# Patient Record
Sex: MALE | Race: WHITE | NOT HISPANIC OR LATINO | Employment: OTHER | URBAN - NONMETROPOLITAN AREA
[De-identification: names, ages, dates, MRNs, and addresses within clinical notes are randomized per-mention and may not be internally consistent; named-entity substitution may affect disease eponyms.]

---

## 2017-01-11 ENCOUNTER — OFFICE VISIT (OUTPATIENT)
Dept: ORTHOPEDICS | Facility: OTHER | Age: 42
End: 2017-01-11
Attending: ORTHOPAEDIC SURGERY

## 2017-01-11 VITALS
BODY MASS INDEX: 25.92 KG/M2 | HEART RATE: 82 BPM | TEMPERATURE: 98.1 F | SYSTOLIC BLOOD PRESSURE: 100 MMHG | WEIGHT: 175 LBS | DIASTOLIC BLOOD PRESSURE: 60 MMHG | HEIGHT: 69 IN | OXYGEN SATURATION: 97 %

## 2017-01-11 DIAGNOSIS — S54.01XD CONTUSION OF RIGHT ULNAR NERVE, SUBSEQUENT ENCOUNTER: ICD-10-CM

## 2017-01-11 DIAGNOSIS — S42.444D CLOSED NONDISPLACED AVULSION FRACTURE OF MEDIAL EPICONDYLE OF RIGHT HUMERUS WITH ROUTINE HEALING, SUBSEQUENT ENCOUNTER: Primary | ICD-10-CM

## 2017-01-11 PROCEDURE — 99207 ZZC FRACTURE CARE IN GLOBAL PERIOD: CPT | Performed by: PHYSICIAN ASSISTANT

## 2017-01-11 ASSESSMENT — PAIN SCALES - GENERAL: PAINLEVEL: MILD PAIN (3)

## 2017-01-11 NOTE — MR AVS SNAPSHOT
"              After Visit Summary   1/11/2017    Jose Alberto Lu    MRN: [id number]           Patient Information     Date Of Birth          1975        Visit Information        Provider Department      1/11/2017 10:00 AM Damian Bond PA-C  [facility]        Today's Diagnoses     Closed nondisplaced avulsion fracture of medial epicondyle of right humerus with routine healing, subsequent encounter    -  1     Contusion of right ulnar nerve, subsequent encounter           Care Instructions    Work on ROM as discussed.  Light use with right arm is ok.  Follow up in 3 weeks for recheck.        Follow-ups after your visit        Follow-up notes from your care team     Return in about 3 weeks (around 2/1/2017) for recheck right elbow.      Who to contact     If you have questions or need follow up information about today's clinic visit or your schedule please contact  [facility] directly at [phone].  Normal or non-critical lab and imaging results will be communicated to you by ITNhart, letter or phone within 4 business days after the clinic has received the results. If you do not hear from us within 7 days, please contact the clinic through ITNhart or phone. If you have a critical or abnormal lab result, we will notify you by phone as soon as possible.  Submit refill requests through [employer] or call your pharmacy and they will forward the refill request to us. Please allow 3 business days for your refill to be completed.          Additional Information About Your Visit        MyChart Information     [employer] lets you send messages to your doctor, view your test results, renew your prescriptions, schedule appointments and more. To sign up, go to [email or web address] . Click on \"Log in\" on the left side of the screen, which will take you to the Welcome page. Then click on \"Sign up Now\" on the right side of the page.     You will be asked to enter the access code listed below, as well as some " "personal information. Please follow the directions to create your username and password.     Your access code is: [email or web address]  Expires: 3/23/2017  6:27 PM     Your access code will  in 90 days. If you need help or a new code, please call your [facility] clinic or [phone].        Care EveryWhere ID     This is your Care EveryWhere ID. This could be used by other organizations to access your [facility] medical records  [id number]        Your Vitals Were     Pulse Temperature Height BMI (Body Mass Index) Pulse Oximetry       82 98.1  F (36.7  C) (Tympanic) 5' 9\" (1.753 m) 25.83 kg/m2 97%        Blood Pressure from Last 3 Encounters:   17 100/60   16 100/80   16 114/67    Weight from Last 3 Encounters:   17 175 lb (79.379 kg)   16 179 lb 3.2 oz (81.285 kg)   13 170 lb (77.111 kg)              Today, you had the following     No orders found for display       Primary Care Provider    None       No address on file        Thank you!     Thank you for choosing  [facility]  for your care. Our goal is always to provide you with excellent care. Hearing back from our patients is one way we can continue to improve our services. Please take a few minutes to complete the written survey that you may receive in the mail after your visit with us. Thank you!             Your Updated Medication List - Protect others around you: Learn how to safely use, store and throw away your medicines at www.disposemymeds.org.          This list is accurate as of: 17 10:19 AM.  Always use your most recent med list.                   Brand Name Dispense Instructions for use    CLONIDINE HCL PO      Take 0.1 mg by mouth 2 times daily       REMERON PO      Take 30 mg by mouth At Bedtime       traMADol 50 MG tablet    ULTRAM    15 tablet    Take half to one tablet every 6-8 hours as needed for pain         "

## 2017-01-11 NOTE — PROGRESS NOTES
"Chief complaint: Recheck right elbow    Subjective: Jose Alberto returns today for recheck of his right elbow.  He was seen on 12/27/16 by Dr. Larios and diagnosed with a right elbow nondisplaced avulsion fracture of the medial epicondyle and associated ulnar nerve contusion after being hit by a puck.  He was placed in a long-arm splint which he has tolerated reasonably well.  He kept it intact.  At his initial visit he was also noted to have median nerve and superficial radial nerve paresthesias which was felt due to a tight dressing.  These symptoms have resolved.  He denies paresthesias in an ulnar nerve distribution at this point unless he bumps the inside of the right elbow.  Overall, he has much less discomfort.  He feels 80% improved.  He is here today to see if he is going to be able to start his new job this coming Monday.  At this point, the patient feels he should be ready.    Past medical, past surgical, social history, family history, medications, and allergies reviewed and updated as appropriate today in [facility].    ROS:  See HPI    Current Outpatient Prescriptions   Medication Sig Dispense Refill     CLONIDINE HCL PO Take 0.1 mg by mouth 2 times daily       Mirtazapine (REMERON PO) Take 30 mg by mouth At Bedtime       traMADol (ULTRAM) 50 MG tablet Take half to one tablet every 6-8 hours as needed for pain 15 tablet 0       Objective:   /60 mmHg  Pulse 82  Temp(Src) 98.1  F (36.7  C) (Tympanic)  Ht 5' 9\" (1.753 m)  Wt 175 lb (79.379 kg)  BMI 25.83 kg/m2  SpO2 97%  Healthy-appearing 41-year-old male in no acute distress.  He is alert and oriented ×3.  He is pleasant and cooperative.  Splint removed.  Slight rash and skin irritation about the left upper extremity is noted.  There are multiple excoriations noted as well.  Patient denies sticking anything into the splint other than his fingers to deal with itching.  No significant erythema or warmth.  Abrasion along the medial elbow appears " healed.  He continues to have mild to moderate edema about the medial elbow.  No appreciable ecchymosis.  Skin is normothermic to touch.  He has mild discomfort with palpation along the medial epicondyle today and adjacent soft tissues.  He has positive Tinel's over the ulnar nerve.  He demonstrates surprisingly good elbow range of motion with full elbow extension and near full flexion.  He has full forearm supination and pronation.  He has good wrist range of motion as well.  He does have increased discomfort with resisted wrist flexion and forearm pronation.  He otherwise has intact sensation to soft touch in a radial, ulnar, and median nerve distribution distally.  He has 2+ radial pulse and good capillary refill.    Imaging studies: None new.    Assessment: Right elbow nondisplaced avulsion fracture of the medial epicondyle with associated ulnar nerve contusion, improving.    Plan: I had a discussion today with the patient in regards to his progress thus far as well as future treatment.  I discussed with him that ideally he would have 2-3 more weeks of recovery time to work on range of motion and light use only of the right upper extremity.  He unfortunately will not be able to give himself that time due to starting a new job which is very important to him financially.  We discussed doing his best to refrain from any real heavy use with the right arm for the next couple of weeks if at all possible.  He appears to understand.  He will follow up in 3 weeks for recheck, sooner if his symptoms worsen.  All questions were answered.    Damian Bond PA-C

## 2017-01-11 NOTE — NURSING NOTE
"Chief Complaint   Patient presents with     RECHECK     Follow up right arm per Dr Larios.       Initial /60 mmHg  Pulse 82  Temp(Src) 98.1  F (36.7  C) (Tympanic)  Ht 5' 9\" (1.753 m)  Wt 175 lb (79.379 kg)  BMI 25.83 kg/m2  SpO2 97% Estimated body mass index is 25.83 kg/(m^2) as calculated from the following:    Height as of this encounter: 5' 9\" (1.753 m).    Weight as of this encounter: 175 lb (79.379 kg).  BP completed using cuff size: alonso Maria LPN      "

## 2017-02-01 ENCOUNTER — TELEPHONE (OUTPATIENT)
Dept: ORTHOPEDICS | Facility: OTHER | Age: 42
End: 2017-02-01

## 2017-02-01 NOTE — TELEPHONE ENCOUNTER
Called patient. Offered patient chance to come in earlier as we had cancellations. Patient stated he forgot and couldn't come in at all today so I transfered him to [facility] [place].  Lilly Maria LPN

## 2017-04-14 ENCOUNTER — HOSPITAL ENCOUNTER (EMERGENCY)
Facility: HOSPITAL | Age: 42
Discharge: HOME | End: 2017-04-14
Attending: NURSE PRACTITIONER | Admitting: NURSE PRACTITIONER

## 2017-04-14 VITALS
SYSTOLIC BLOOD PRESSURE: 135 MMHG | RESPIRATION RATE: 18 BRPM | DIASTOLIC BLOOD PRESSURE: 85 MMHG | TEMPERATURE: 98.9 F | OXYGEN SATURATION: 96 %

## 2017-04-14 DIAGNOSIS — B34.9 VIRAL SYNDROME: ICD-10-CM

## 2017-04-14 LAB
DEPRECATED S PYO AG THROAT QL EIA: NORMAL
FLUAV+FLUBV AG SPEC QL: NEGATIVE
FLUAV+FLUBV AG SPEC QL: NORMAL
MICRO REPORT STATUS: NORMAL
SPECIMEN SOURCE: NORMAL
SPECIMEN SOURCE: NORMAL

## 2017-04-14 PROCEDURE — 87081 CULTURE SCREEN ONLY: CPT | Performed by: NURSE PRACTITIONER

## 2017-04-14 PROCEDURE — 87804 INFLUENZA ASSAY W/OPTIC: CPT | Performed by: NURSE PRACTITIONER

## 2017-04-14 PROCEDURE — 25000132 ZZH RX MED GY IP 250 OP 250 PS 637: Performed by: NURSE PRACTITIONER

## 2017-04-14 PROCEDURE — 99213 OFFICE O/P EST LOW 20 MIN: CPT

## 2017-04-14 PROCEDURE — 99213 OFFICE O/P EST LOW 20 MIN: CPT | Performed by: NURSE PRACTITIONER

## 2017-04-14 PROCEDURE — 87880 STREP A ASSAY W/OPTIC: CPT | Performed by: NURSE PRACTITIONER

## 2017-04-14 RX ORDER — IBUPROFEN 800 MG/1
800 TABLET, FILM COATED ORAL ONCE
Status: COMPLETED | OUTPATIENT
Start: 2017-04-14 | End: 2017-04-14

## 2017-04-14 RX ADMIN — IBUPROFEN 800 MG: 800 TABLET ORAL at 10:20

## 2017-04-14 ASSESSMENT — ENCOUNTER SYMPTOMS
ABDOMINAL PAIN: 0
TROUBLE SWALLOWING: 0
SORE THROAT: 1
COUGH: 1
RHINORRHEA: 1
FEVER: 1

## 2017-04-14 NOTE — ED AVS SNAPSHOT
[facility]    [address] [place] [address]    Phone:  [phone]                                       Jose Alberto Lu   MRN: [id number]    Department:  [facility]   Date of Visit:  4/14/2017           After Visit Summary Signature Page     I have received my discharge instructions, and my questions have been answered. I have discussed any challenges I see with this plan with the nurse or doctor.    ..........................................................................................................................................  Patient/Patient Representative Signature      ..........................................................................................................................................  Patient Representative Print Name and Relationship to Patient    ..................................................               ................................................  Date                                            Time    ..........................................................................................................................................  Reviewed by Signature/Title    ...................................................              ..............................................  Date                                                            Time

## 2017-04-14 NOTE — DISCHARGE INSTRUCTIONS
"  The rapid strep was negative, the strep culture is pending, we will call you with results in 24-48 hours and treat with antibiotics as needed  Warm salt water gargles several times per day  Ibuprofen and tylenol per package directions for pain/fever  Cepacol lozenges OTC per package directions  Increase fluids, wash hands frequently, rest  Follow up with your Primary Care provider if symptoms do not improve in 3-5 days    Viral Syndrome (Adult)  A viral illness may cause a number of symptoms. The symptoms depend on the part of the body that the virus affects. If it settles in the nose, throat, and lungs, it may cause cough, sore throat, congestion, and sometimes headache. If it settles in the stomach and intestinal tract, it may cause vomiting and diarrhea. Sometimes it causes vague symptoms like \"aching all over,\" feeling tired, loss of appetite, or fever.  A viral illness usually lasts 1 to 2 weeks, but sometimes it lasts longer. In some cases, a more serious infection can look like a viral syndrome in the first few days of the illness. You may need another exam and additional tests to know the difference. Watch for the warning signs listed below.  Home care  Follow these guidelines for taking care of yourself at home:    If symptoms are severe, rest at home for the first 2 to 3 days.    Stay away from cigarette smoke - both your smoke and the smoke from others.    You may use over-the-counter medication acetaminophen or ibuprofen for fever, muscle aching, and headache, unless another medicine was prescribed for this. If you have chronic liver or kidney disease or ever had a stomach ulcer or GI bleeding, talk with your doctor before using these medicines . No one who is younger than 18 and ill with a fever should take aspirin. It may cause severe disease or death liver damage .    Your appetite may be poor, so a light diet is fine. Avoid dehydration by drinking 8 to 12 8-ounce glasses of fluids each day. This " may include water; orange juice; lemonade; apple, grape, and cranberry juice; clear fruit drinks; electrolyte replacement and sports drinks; and decaffeinated teas and coffee. If you have been diagnosed with a kidney disease, ask your doctor how much and what types of fluids you should drink to prevent dehydration. If you have kidney disease, drinking too much fluid can cause it build up in the your body and be dangerous to your health.    Over-the-counter remedies won't shorten the length of the illness but may be helpful for cough, sore throat; and nasal and sinus congestion. Don't use decongestants if you have high blood pressure.  Follow-up care  Follow up with your health care provider if you do not improve over the next week.  When to seek medical advice  Call your healthcare provider right away if any of these occur:    Cough with lots of colored sputum (mucus) or blood in your sputum    Chest pain, shortness of breath, wheezing, or difficulty breathing    Severe headache; face, neck, or ear pain    Severe, constant pain in the lower right side of your belly (abdominal)    Continued vomiting (can t keep liquids down)    Frequent diarrhea (more than 5 times a day); blood (red or black color) or mucus in diarrhea    Feeling weak, dizzy, or like you are going to faint    Extreme thirst    Fever of 100.4 F (38 C) or higher, or as directed by your healthcare provider

## 2017-04-14 NOTE — ED NOTES
Patient presents with complaints of a sore throat, fever, body aches and cough x 1 day.  Patient eating licorice in triage so rapid strep deferred.

## 2017-04-14 NOTE — ED AVS SNAPSHOT
[facility]    [address] [address]    Phone:  [phone]                                       Jose Alberto Lu   MRN: [id number]    Department:  [facility]   Date of Visit:  4/14/2017           Patient Information     Date Of Birth          1975        Your diagnoses for this visit were:     Viral syndrome        You were seen by Alayna Payan NP.      Follow-up Information     Please follow up.    Why:  See PCP in 3-5 days if not improving        Follow up with [facility].    Specialty:  EMERGENCY MEDICINE    Why:  If symptoms worsen    Contact information:    [address] [place] [address] [phone]    Additional information:    From [place]: Take [address]. Turn left at [address]. Turn left at the first stoplight on [address]. At the first stop sign, take a right onto [address]. Take a left into the parking lot and continue through until you reach the North enterance of the building.       From [place]: Take [address]. Take the [address] ramp towards [place]. Turn left onto [address]. Take a slight right onto [address]. Turn left at the first stoplight on [address]. At the first stop sign, take a right onto [address]. Take a left into the parking lot and continue through until you reach the North enterance of the building.       From [place]: Take [address]. Take a right at [address]. At the first stop sign, take a right onto [address]. Take a left into the parking lot and continue through until you reach the North enterance of the building.         Discharge Instructions         The rapid strep was negative, the strep culture is pending, we will call you with results in 24-48 hours and treat with antibiotics as needed  Warm salt water gargles several times per day  Ibuprofen and tylenol per package  "directions for pain/fever  Cepacol lozenges OTC per package directions  Increase fluids, wash hands frequently, rest  Follow up with your Primary Care provider if symptoms do not improve in 3-5 days    Viral Syndrome (Adult)  A viral illness may cause a number of symptoms. The symptoms depend on the part of the body that the virus affects. If it settles in the nose, throat, and lungs, it may cause cough, sore throat, congestion, and sometimes headache. If it settles in the stomach and intestinal tract, it may cause vomiting and diarrhea. Sometimes it causes vague symptoms like \"aching all over,\" feeling tired, loss of appetite, or fever.  A viral illness usually lasts 1 to 2 weeks, but sometimes it lasts longer. In some cases, a more serious infection can look like a viral syndrome in the first few days of the illness. You may need another exam and additional tests to know the difference. Watch for the warning signs listed below.  Home care  Follow these guidelines for taking care of yourself at home:    If symptoms are severe, rest at home for the first 2 to 3 days.    Stay away from cigarette smoke - both your smoke and the smoke from others.    You may use over-the-counter medication acetaminophen or ibuprofen for fever, muscle aching, and headache, unless another medicine was prescribed for this. If you have chronic liver or kidney disease or ever had a stomach ulcer or GI bleeding, talk with your doctor before using these medicines . No one who is younger than 18 and ill with a fever should take aspirin. It may cause severe disease or death liver damage .    Your appetite may be poor, so a light diet is fine. Avoid dehydration by drinking 8 to 12 8-ounce glasses of fluids each day. This may include water; orange juice; lemonade; apple, grape, and cranberry juice; clear fruit drinks; electrolyte replacement and sports drinks; and decaffeinated teas and coffee. If you have been diagnosed with a kidney disease, " ask your doctor how much and what types of fluids you should drink to prevent dehydration. If you have kidney disease, drinking too much fluid can cause it build up in the your body and be dangerous to your health.    Over-the-counter remedies won't shorten the length of the illness but may be helpful for cough, sore throat; and nasal and sinus congestion. Don't use decongestants if you have high blood pressure.  Follow-up care  Follow up with your health care provider if you do not improve over the next week.  When to seek medical advice  Call your healthcare provider right away if any of these occur:    Cough with lots of colored sputum (mucus) or blood in your sputum    Chest pain, shortness of breath, wheezing, or difficulty breathing    Severe headache; face, neck, or ear pain    Severe, constant pain in the lower right side of your belly (abdominal)    Continued vomiting (can t keep liquids down)    Frequent diarrhea (more than 5 times a day); blood (red or black color) or mucus in diarrhea    Feeling weak, dizzy, or like you are going to faint    Extreme thirst    Fever of 100.4 F (38 C) or higher, or as directed by your healthcare provider          Review of your medicines      Notice     You have not been prescribed any medications.            Procedures and tests performed during your visit     Beta strep group A culture    Influenza A/B antigen    Rapid strep screen      Orders Needing Specimen Collection     None      Pending Results     Date and Time Order Name Status Description    4/14/2017 1045 Beta strep group A culture In process             Pending Culture Results     Date and Time Order Name Status Description    4/14/2017 1045 Beta strep group A culture In process             Thank you for choosing [facility]       Thank you for choosing [facility] for your care. Our goal is always to provide you with excellent care. Hearing back from our patients is one way we can continue to improve our services.  "Please take a few minutes to complete the written survey that you may receive in the mail after you visit with us. Thank you!        [employer] Information     [employer] lets you send messages to your doctor, view your test results, renew your prescriptions, schedule appointments and more. To sign up, go to [email or web address] . Click on \"Log in\" on the left side of the screen, which will take you to the Welcome page. Then click on \"Sign up Now\" on the right side of the page.     You will be asked to enter the access code listed below, as well as some personal information. Please follow the directions to create your username and password.     Your access code is: [email or web address]  Expires: 2017 11:08 AM     Your access code will  in 90 days. If you need help or a new code, please call your [facility] clinic or [phone].        Care EveryWhere ID     This is your Care EveryWhere ID. This could be used by other organizations to access your [facility] medical records  [id number]        After Visit Summary       This is your record. Keep this with you and show to your community pharmacist(s) and doctor(s) at your next visit.                  "

## 2017-04-14 NOTE — ED PROVIDER NOTES
History     Chief Complaint   Patient presents with     Pharyngitis     c/o sore throat since yesterday     The history is provided by the patient. No  was used.     Jose Alberto Lu is a 41 year old male who presents with a cough, fever, sore throat, headache and body aches since yesterday. Eating and drinking okay. Taking Benadryl and vitamin C. Using cough and cold medication as well. Took Tylenol last night.    I have reviewed the Medications, Allergies, Past Medical and Surgical History, and Social History in the Epic system.    Review of Systems   Constitutional: Positive for fever.   HENT: Positive for congestion, postnasal drip, rhinorrhea and sore throat. Negative for trouble swallowing.    Respiratory: Positive for cough.    Gastrointestinal: Negative for abdominal pain.       Physical Exam   BP: 135/85  Heart Rate: 106  Temp: 98.9  F (37.2  C)  Resp: 18  SpO2: 96 %  Physical Exam   Constitutional: He is oriented to person, place, and time. He appears well-developed and well-nourished. No distress.   HENT:   Head: Normocephalic and atraumatic.   Right Ear: External ear normal.   Left Ear: External ear normal.   Nose: Nose normal.   Mouth/Throat: Oropharynx is clear and moist. No oropharyngeal exudate.   Eyes: Conjunctivae are normal. Right eye exhibits no discharge. Left eye exhibits no discharge. No scleral icterus.   Neck: Normal range of motion. Neck supple.   Cardiovascular: Normal rate, regular rhythm and normal heart sounds.    Pulmonary/Chest: Effort normal and breath sounds normal. No respiratory distress. He has no wheezes.   Musculoskeletal: Normal range of motion.   Lymphadenopathy:     He has no cervical adenopathy.   Neurological: He is alert and oriented to person, place, and time.   Skin: Skin is warm and dry. He is not diaphoretic.   Psychiatric: He has a normal mood and affect.   Nursing note and vitals reviewed.      ED Course     ED Course     Procedures           Labs Ordered and Resulted from Time of ED Arrival Up to the Time of Departure from the ED   INFLUENZA A/B ANTIGEN   RAPID STREP SCREEN   BETA STREP GROUP A CULTURE      Medications   ibuprofen (ADVIL/MOTRIN) tablet 800 mg (800 mg Oral Given 4/14/17 1020)         Assessments & Plan (with Medical Decision Making)     I have reviewed the nursing notes.  I have reviewed the findings, diagnosis, plan and need for follow up with the patient.  Patient verbally educated and given appropriate education sheets for each of their diagnoses and has no questions.  Will call if culture is positive.   Take ibuprofen or Tylenol as directed on the bottle as needed.  Take OTC cold medicine as directed on bottle  Increase fluids and rest.  Return to ED/UC if symptoms worsen  If symptoms persist 7-10 days follow up with PCP for re-evaluation.      Final diagnoses:   Viral syndrome       4/14/2017   [facility]     Alayna Payan NP  04/14/17 [id number]

## 2017-04-16 LAB
BACTERIA SPEC CULT: NORMAL
MICRO REPORT STATUS: NORMAL
SPECIMEN SOURCE: NORMAL

## 2017-06-09 ENCOUNTER — HOSPITAL ENCOUNTER (EMERGENCY)
Facility: HOSPITAL | Age: 42
Discharge: HOME | End: 2017-06-09

## 2017-06-09 VITALS
OXYGEN SATURATION: 97 % | TEMPERATURE: 97.2 F | HEART RATE: 82 BPM | DIASTOLIC BLOOD PRESSURE: 77 MMHG | SYSTOLIC BLOOD PRESSURE: 114 MMHG | RESPIRATION RATE: 16 BRPM

## 2017-06-09 DIAGNOSIS — K04.7 DENTAL ABSCESS: ICD-10-CM

## 2017-06-09 PROCEDURE — 99213 OFFICE O/P EST LOW 20 MIN: CPT

## 2017-06-09 RX ORDER — IBUPROFEN 800 MG/1
800 TABLET, FILM COATED ORAL EVERY 8 HOURS PRN
Qty: 60 TABLET | Refills: 0 | Status: SHIPPED | OUTPATIENT
Start: 2017-06-09 | End: 2017-06-17

## 2017-06-09 RX ORDER — ACETAMINOPHEN 500 MG
1000 TABLET ORAL EVERY 6 HOURS PRN
Qty: 100 TABLET | Refills: 0 | Status: SHIPPED | OUTPATIENT
Start: 2017-06-09 | End: 2018-02-22

## 2017-06-09 RX ORDER — PENICILLIN V POTASSIUM 500 MG/1
500 TABLET, FILM COATED ORAL 4 TIMES DAILY
Qty: 40 TABLET | Refills: 0 | Status: SHIPPED | OUTPATIENT
Start: 2017-06-09 | End: 2017-06-19

## 2017-06-09 ASSESSMENT — ENCOUNTER SYMPTOMS
CONFUSION: 0
ARTHRALGIAS: 0
DIFFICULTY URINATING: 0
FEVER: 0
ABDOMINAL PAIN: 0
SHORTNESS OF BREATH: 0
COLOR CHANGE: 0
FACIAL SWELLING: 1
HEADACHES: 0
NECK STIFFNESS: 0
EYE REDNESS: 0

## 2017-06-09 NOTE — ED NOTES
Pt comes in with reported dental pain and jaw swelling that he woke up with.  Does not note any temps.

## 2017-06-09 NOTE — ED PROVIDER NOTES
History     Chief Complaint   Patient presents with     Dental Pain     L jaw swelling and pain     The history is provided by the patient. No  was used.     Jose Alberto Lu is a 42 year old male who presents to  for evaluation of dental pain, facial swelling. States he chipped his left lower 2nd molar, filling came out about a year ago. Intermittent pain since. Last night developing swelling in left cheek. Has not seen dentist yet. Denies fever, no drainage.     I have reviewed the Medications, Allergies, Past Medical and Surgical History, and Social History in the Epic system.    Review of Systems   Constitutional: Negative for fever.   HENT: Positive for dental problem and facial swelling. Negative for congestion.    Eyes: Negative for redness.   Respiratory: Negative for shortness of breath.    Cardiovascular: Negative for chest pain.   Gastrointestinal: Negative for abdominal pain.   Genitourinary: Negative for difficulty urinating.   Musculoskeletal: Negative for arthralgias and neck stiffness.   Skin: Negative for color change.   Neurological: Negative for headaches.   Psychiatric/Behavioral: Negative for confusion.       Physical Exam   BP: 114/77  Pulse: 82  Temp: 97.2  F (36.2  C)  Resp: 16  SpO2: 97 %  Physical Exam   Constitutional: He is oriented to person, place, and time.   HENT:   Head:       Mouth/Throat: Uvula is midline, oropharynx is clear and moist and mucous membranes are normal. Dental abscesses and dental caries present. No oropharyngeal exudate.       Large dental caries #18 with surrounding erythema with associated facial swelling, 4 cm in diameter as illustrated.    Eyes: Conjunctivae are normal.   Neck: Normal range of motion.   Cardiovascular: Normal rate and regular rhythm.    Pulmonary/Chest: Effort normal. No respiratory distress.   Abdominal: Soft. There is no tenderness.   Musculoskeletal: Normal range of motion.   Lymphadenopathy:     He has no cervical  adenopathy.   Neurological: He is alert and oriented to person, place, and time.   Skin: Skin is warm and dry.   Vitals reviewed.      ED Course   Procedures        Assessments & Plan (with Medical Decision Making)   Pt presents with dental caries, facial swelling. Exam as above.  Findings consistent with abscess, facial cellulitis.   Rx's given, advised to see dentist ASAP. Pt verbalizes understanding and agrees with plan.    Epic discharge instructions given. Pt discharged in stable condition.       I have reviewed the nursing notes.    I have reviewed the findings, diagnosis, plan and need for follow up with the patient.    Discharge Medication List as of 6/9/2017 12:18 PM      START taking these medications    Details   penicillin V potassium (VEETID) 500 MG tablet Take 1 tablet (500 mg) by mouth 4 times daily for 10 days, Disp-40 tablet, R-0, E-Prescribe      ibuprofen (ADVIL/MOTRIN) 800 MG tablet Take 1 tablet (800 mg) by mouth every 8 hours as needed for moderate pain, Disp-60 tablet, R-0, E-Prescribe      acetaminophen (TYLENOL) 500 MG tablet Take 2 tablets (1,000 mg) by mouth every 6 hours as needed for mild pain, Disp-100 tablet, R-0, E-Prescribe             Final diagnoses:   Dental abscess     See attached for home care  See dentist ASAP  Take all of antibiotic as prescribed  Tylenol, ibuprofen for pain   tooth ache kit and use as directed  Return to  with worsening sx.     6/9/2017   [facility]     Cassie Wiggins APRN FNZOË  06/09/17 [id number]

## 2017-06-09 NOTE — DISCHARGE INSTRUCTIONS
"See attached for home care  See dentist ASAP  Take all of antibiotic as prescribed  Tylenol, ibuprofen for pain   tooth ache kit and use as directed  Return to  with worsening sx.     Dental Abscess with Facial Cellulitis    A dental abscess is an infection at the base of a tooth. It means a pocket of pus has formed at the tip of a tooth root in your jaw bone. If the infection isn t treated, it can spread to the gum near the tooth. This causes swelling and pain. More serious infections spread to the face. This causes your face to swell (cellulitis). This is a very serious condition. Once the swelling begins, it can spread quickly.  A dental abscess usually starts with a crack or cavity in a tooth. The pain is often made worse by drinking hot or cold beverages, or biting on hard foods. The pain may spread from the tooth to your ear or the area of your jaw on the same side.  Home care  Follow these tips when caring for yourself at home:    Avoid hot and cold foods and drinks. Your tooth may be sensitive to changes in temperature. Don t chew on the side of the infected tooth.    If your tooth is chipped or cracked, or if there is a large open cavity, put oil of cloves directly on the tooth to relieve pain. You can buy oil of cloves at drugstores. Some pharmacies carry an over-the-counter \"toothache kit.\" This contains a paste that you can put on the exposed tooth to make it less sensitive.    Put a cold pack on your jaw over the sore area to help reduce pain.    You may use acetaminophen or ibuprofen to ease pain, unless another medicine was prescribed. Note: If you have chronic liver or kidney disease, talk with your health care provider before using these medicines. Also talk with your provider if you ve had a stomach ulcer or GI bleeding.     An antibiotic will be prescribed. Take it exactly as directed. Don t miss any doses.  Follow-up care  Follow up with your dentist or an oral surgeon as advised. Severe " cases of cellulitis must be checked again within 24 hours. Once an infection occurs in a tooth, it will continue to be a problem until the infection is drained. This is done through surgery or a root canal. Or you may need to have your tooth pulled.  When to seek medical advice  Call your health care provider right away if any of these occur:    Swelling spreads to the upper half of your face or neck    You eyelids begin to swell shut    Pain gets worse or spreads to your neck    Fever of 100.4 F (38 C) or higher, or as directed by your health care provider    Unusual drowsiness    Headache or a stiff neck    Weakness or fainting    Difficulty swallowing or breathing       3446-3497 [employer]. [address] [place] [address], [place] [address]. All rights reserved. This information is not intended as a substitute for professional medical care. Always follow your healthcare professional's instructions.

## 2017-06-09 NOTE — ED AVS SNAPSHOT
[facility]    [address] [address]    Phone:  [phone]                                       Jose Alberto Lu   MRN: [id number]    Department:  [facility]   Date of Visit:  6/9/2017           Patient Information     Date Of Birth          1975        Your diagnoses for this visit were:     Dental abscess        You were seen by Cassie Wiggins APRN FNP.      Follow-up Information     Schedule an appointment as soon as possible for a visit with Dentist.        Follow up with [facility].    Specialty:  EMERGENCY MEDICINE    Why:  As needed, If symptoms worsen    Contact information:    [address] [place] [address] [phone]    Additional information:    From [facility]: Take [address]. Turn left at [address]. Turn left at the first stoplight on [address]. At the first stop sign, take a right onto [address]. Take a left into the parking lot and continue through until you reach the North enterance of the building.       From [place]: Take [address]. Take the [address] ramp towards [place]. Turn left onto [address]. Take a slight right onto [address]. Turn left at the first stoplight on [address]. At the first stop sign, take a right onto [address]. Take a left into the parking lot and continue through until you reach the North enterance of the building.       From [place]: Take [address]. Take a right at [address]. At the first stop sign, take a right onto [address]. Take a left into the parking lot and continue through until you reach the North enterance of the building.         Discharge Instructions       See attached for home care  See dentist ASAP  Take all of antibiotic as prescribed  Tylenol, ibuprofen for pain   tooth ache kit and use as directed  Return to  with worsening sx.     Dental Abscess with Facial  "Cellulitis    A dental abscess is an infection at the base of a tooth. It means a pocket of pus has formed at the tip of a tooth root in your jaw bone. If the infection isn t treated, it can spread to the gum near the tooth. This causes swelling and pain. More serious infections spread to the face. This causes your face to swell (cellulitis). This is a very serious condition. Once the swelling begins, it can spread quickly.  A dental abscess usually starts with a crack or cavity in a tooth. The pain is often made worse by drinking hot or cold beverages, or biting on hard foods. The pain may spread from the tooth to your ear or the area of your jaw on the same side.  Home care  Follow these tips when caring for yourself at home:    Avoid hot and cold foods and drinks. Your tooth may be sensitive to changes in temperature. Don t chew on the side of the infected tooth.    If your tooth is chipped or cracked, or if there is a large open cavity, put oil of cloves directly on the tooth to relieve pain. You can buy oil of cloves at [employer]. Some pharmacies carry an over-the-counter \"toothache kit.\" This contains a paste that you can put on the exposed tooth to make it less sensitive.    Put a cold pack on your jaw over the sore area to help reduce pain.    You may use acetaminophen or ibuprofen to ease pain, unless another medicine was prescribed. Note: If you have chronic liver or kidney disease, talk with your health care provider before using these medicines. Also talk with your provider if you ve had a stomach ulcer or GI bleeding.     An antibiotic will be prescribed. Take it exactly as directed. Don t miss any doses.  Follow-up care  Follow up with your dentist or an oral surgeon as advised. Severe cases of cellulitis must be checked again within 24 hours. Once an infection occurs in a tooth, it will continue to be a problem until the infection is drained. This is done through surgery or a root canal. Or you may " need to have your tooth pulled.  When to seek medical advice  Call your health care provider right away if any of these occur:    Swelling spreads to the upper half of your face or neck    You eyelids begin to swell shut    Pain gets worse or spreads to your neck    Fever of 100.4 F (38 C) or higher, or as directed by your health care provider    Unusual drowsiness    Headache or a stiff neck    Weakness or fainting    Difficulty swallowing or breathing       6573-6552 [employer]. [address] [place] [address]. All rights reserved. This information is not intended as a substitute for professional medical care. Always follow your healthcare professional's instructions.               Review of your medicines      START taking        Dose / Directions Last dose taken    acetaminophen 500 MG tablet   Commonly known as:  TYLENOL   Dose:  1000 mg   Quantity:  100 tablet        Take 2 tablets (1,000 mg) by mouth every 6 hours as needed for mild pain   Refills:  0        ibuprofen 800 MG tablet   Commonly known as:  ADVIL/MOTRIN   Dose:  800 mg   Quantity:  60 tablet        Take 1 tablet (800 mg) by mouth every 8 hours as needed for moderate pain   Refills:  0        penicillin V potassium 500 MG tablet   Commonly known as:  VEETID   Dose:  500 mg   Quantity:  40 tablet        Take 1 tablet (500 mg) by mouth 4 times daily for 10 days   Refills:  0                Prescriptions were sent or printed at these locations (3 Prescriptions)                   [facility] [id number]  [address] [address] [address]   [address] [address] [address]    Telephone:  [phone]   Fax:  [phone]   Hours:                  E-Prescribed (3 of 3)         penicillin V potassium (VEETID) 500 MG tablet               ibuprofen (ADVIL/MOTRIN) 800 MG tablet               acetaminophen (TYLENOL) 500 MG tablet                Orders Needing Specimen Collection     None      Pending Results     No orders found from  "2017 to 6/10/2017.            Pending Culture Results     No orders found from 2017 to 6/10/2017.            Thank you for choosing [facility]       Thank you for choosing [facility] for your care. Our goal is always to provide you with excellent care. Hearing back from our patients is one way we can continue to improve our services. Please take a few minutes to complete the written survey that you may receive in the mail after you visit with us. Thank you!        [employer] Information     [employer] [facility] lets you send messages to your doctor, view your test results, renew your prescriptions, schedule appointments and more. To sign up, go to [email or web address] . Click on \"Log in\" on the left side of the screen, which will take you to the Welcome page. Then click on \"Sign up Now\" on the right side of the page.     You will be asked to enter the access code listed below, as well as some personal information. Please follow the directions to create your username and password.     Your access code is: [email or web address]  Expires: 2017 11:08 AM     Your access code will  in 90 days. If you need help or a new code, please call your [facility] clinic or [phone].        Care EveryWhere ID     This is your Care EveryWhere ID. This could be used by other organizations to access your [facility] medical records  [id number]        After Visit Summary       This is your record. Keep this with you and show to your community pharmacist(s) and doctor(s) at your next visit.                  "

## 2017-06-09 NOTE — ED NOTES
Pt presents today alone for c/o left low dental pain with very obvious swelling. Pain started last night and he woke up with the swelling this am.

## 2017-06-09 NOTE — ED AVS SNAPSHOT
[facility]    [address] [place] [address]    Phone:  [phone]                                       Jose Alberto Lu   MRN: [id number]    Department:  [facility]   Date of Visit:  6/9/2017           After Visit Summary Signature Page     I have received my discharge instructions, and my questions have been answered. I have discussed any challenges I see with this plan with the nurse or doctor.    ..........................................................................................................................................  Patient/Patient Representative Signature      ..........................................................................................................................................  Patient Representative Print Name and Relationship to Patient    ..................................................               ................................................  Date                                            Time    ..........................................................................................................................................  Reviewed by Signature/Title    ...................................................              ..............................................  Date                                                            Time

## 2017-06-15 ENCOUNTER — HOSPITAL ENCOUNTER (EMERGENCY)
Facility: HOSPITAL | Age: 42
Discharge: HOME | End: 2017-06-15
Attending: FAMILY MEDICINE | Admitting: FAMILY MEDICINE

## 2017-06-15 VITALS
SYSTOLIC BLOOD PRESSURE: 123 MMHG | TEMPERATURE: 97.7 F | HEART RATE: 75 BPM | RESPIRATION RATE: 16 BRPM | DIASTOLIC BLOOD PRESSURE: 88 MMHG | OXYGEN SATURATION: 95 %

## 2017-06-15 DIAGNOSIS — K04.7 DENTAL ABSCESS: ICD-10-CM

## 2017-06-15 LAB
BASOPHILS # BLD AUTO: 0 10E9/L (ref 0–0.2)
BASOPHILS NFR BLD AUTO: 0.1 %
DIFFERENTIAL METHOD BLD: ABNORMAL
EOSINOPHIL # BLD AUTO: 0.2 10E9/L (ref 0–0.7)
EOSINOPHIL NFR BLD AUTO: 2.6 %
ERYTHROCYTE [DISTWIDTH] IN BLOOD BY AUTOMATED COUNT: 13.1 % (ref 10–15)
HCT VFR BLD AUTO: 38.3 % (ref 40–53)
HGB BLD-MCNC: 12.7 G/DL (ref 13.3–17.7)
IMM GRANULOCYTES # BLD: 0 10E9/L (ref 0–0.4)
IMM GRANULOCYTES NFR BLD: 0.2 %
LYMPHOCYTES # BLD AUTO: 2.2 10E9/L (ref 0.8–5.3)
LYMPHOCYTES NFR BLD AUTO: 25.5 %
MCH RBC QN AUTO: 31.1 PG (ref 26.5–33)
MCHC RBC AUTO-ENTMCNC: 33.2 G/DL (ref 31.5–36.5)
MCV RBC AUTO: 94 FL (ref 78–100)
MONOCYTES # BLD AUTO: 0.5 10E9/L (ref 0–1.3)
MONOCYTES NFR BLD AUTO: 5.9 %
NEUTROPHILS # BLD AUTO: 5.5 10E9/L (ref 1.6–8.3)
NEUTROPHILS NFR BLD AUTO: 65.7 %
NRBC # BLD AUTO: 0 10*3/UL
NRBC BLD AUTO-RTO: 0 /100
PLATELET # BLD AUTO: 229 10E9/L (ref 150–450)
RBC # BLD AUTO: 4.09 10E12/L (ref 4.4–5.9)
WBC # BLD AUTO: 8.4 10E9/L (ref 4–11)

## 2017-06-15 PROCEDURE — 25000128 H RX IP 250 OP 636: Performed by: FAMILY MEDICINE

## 2017-06-15 PROCEDURE — S0077 INJECTION, CLINDAMYCIN PHOSP: HCPCS

## 2017-06-15 PROCEDURE — 25000128 H RX IP 250 OP 636

## 2017-06-15 PROCEDURE — 25000125 ZZHC RX 250: Performed by: FAMILY MEDICINE

## 2017-06-15 PROCEDURE — 96365 THER/PROPH/DIAG IV INF INIT: CPT

## 2017-06-15 PROCEDURE — 96375 TX/PRO/DX INJ NEW DRUG ADDON: CPT

## 2017-06-15 PROCEDURE — 85025 COMPLETE CBC W/AUTO DIFF WBC: CPT | Performed by: FAMILY MEDICINE

## 2017-06-15 PROCEDURE — S0077 INJECTION, CLINDAMYCIN PHOSP: HCPCS | Performed by: FAMILY MEDICINE

## 2017-06-15 PROCEDURE — 99284 EMERGENCY DEPT VISIT MOD MDM: CPT | Performed by: FAMILY MEDICINE

## 2017-06-15 PROCEDURE — 36415 COLL VENOUS BLD VENIPUNCTURE: CPT | Performed by: FAMILY MEDICINE

## 2017-06-15 PROCEDURE — 99284 EMERGENCY DEPT VISIT MOD MDM: CPT | Mod: 25

## 2017-06-15 PROCEDURE — 25000125 ZZHC RX 250

## 2017-06-15 RX ORDER — SODIUM CHLORIDE 9 MG/ML
INJECTION, SOLUTION INTRAVENOUS
Status: COMPLETED
Start: 2017-06-15 | End: 2017-06-15

## 2017-06-15 RX ORDER — TRAMADOL HYDROCHLORIDE 50 MG/1
1 TABLET, FILM COATED ORAL ONCE
Status: DISCONTINUED | OUTPATIENT
Start: 2017-06-15 | End: 2017-06-15 | Stop reason: HOSPADM

## 2017-06-15 RX ORDER — CLINDAMYCIN HCL 300 MG
300 CAPSULE ORAL 3 TIMES DAILY
Qty: 21 CAPSULE | Refills: 0 | Status: SHIPPED | OUTPATIENT
Start: 2017-06-15 | End: 2017-06-22

## 2017-06-15 RX ORDER — CLINDAMYCIN PHOSPHATE 900 MG/50ML
900 INJECTION, SOLUTION INTRAVENOUS EVERY 8 HOURS
Status: DISCONTINUED | OUTPATIENT
Start: 2017-06-15 | End: 2017-06-15 | Stop reason: HOSPADM

## 2017-06-15 RX ORDER — KETOROLAC TROMETHAMINE 30 MG/ML
30 INJECTION, SOLUTION INTRAMUSCULAR; INTRAVENOUS ONCE
Status: COMPLETED | OUTPATIENT
Start: 2017-06-15 | End: 2017-06-15

## 2017-06-15 RX ORDER — CLINDAMYCIN PHOSPHATE 150 MG/ML
INJECTION, SOLUTION INTRAVENOUS
Status: COMPLETED
Start: 2017-06-15 | End: 2017-06-15

## 2017-06-15 RX ADMIN — CLINDAMYCIN PHOSPHATE 900 MG: 18 INJECTION, SOLUTION INTRAVENOUS at 01:36

## 2017-06-15 RX ADMIN — SODIUM CHLORIDE 100 ML: 9 INJECTION, SOLUTION INTRAVENOUS at 01:36

## 2017-06-15 RX ADMIN — CLINDAMYCIN PHOSPHATE 150 MG: 150 INJECTION, SOLUTION INTRAMUSCULAR; INTRAVENOUS at 01:36

## 2017-06-15 RX ADMIN — KETOROLAC TROMETHAMINE 30 MG: 30 INJECTION, SOLUTION INTRAMUSCULAR at 02:41

## 2017-06-15 ASSESSMENT — ENCOUNTER SYMPTOMS
NERVOUS/ANXIOUS: 1
FEVER: 0
MUSCULOSKELETAL NEGATIVE: 1
NEUROLOGICAL NEGATIVE: 1
ABDOMINAL PAIN: 0
SHORTNESS OF BREATH: 0
FATIGUE: 0
DIAPHORESIS: 0
ACTIVITY CHANGE: 1
FACIAL SWELLING: 1

## 2017-06-15 NOTE — ED AVS SNAPSHOT
[facility]    [address] [place] [address]    Phone:  [phone]                                       Jose Alberto Lu   MRN: [id number]    Department:  [facility]   Date of Visit:  6/15/2017           After Visit Summary Signature Page     I have received my discharge instructions, and my questions have been answered. I have discussed any challenges I see with this plan with the nurse or doctor.    ..........................................................................................................................................  Patient/Patient Representative Signature      ..........................................................................................................................................  Patient Representative Print Name and Relationship to Patient    ..................................................               ................................................  Date                                            Time    ..........................................................................................................................................  Reviewed by Signature/Title    ...................................................              ..............................................  Date                                                            Time

## 2017-06-15 NOTE — ED NOTES
"Pt to room 1 ambulatory. Pt notes that he was seen on Monday in [facility] for same problem, but pain is worse and swelling \"came back today\". Pt has swelling the left lower jaw. Pt last had ibuprofen around 2000 this past evening. Call light in reach.  "

## 2017-06-15 NOTE — DISCHARGE INSTRUCTIONS
Dental Abscess  An abscess is a sac of pus. A dental abscess forms when a tooth or the tissue around it becomes infected with bacteria. The bacteria can enter through a cavity or a crack in a tooth. It can also infect the gum tissue or bone around a tooth. An untreated abscess can cause the loss of the tooth. It can even spread to other parts of the body and become life-threatening.    Symptoms of a dental abscess   Signs of a dental abscess include:    Toothache, often severe    Tooth pain with hot, cold, or pressure    Pain in the gums, cheek, or jaw    Bad breath or bitter taste in the mouth    Trouble swallowing or opening the mouth    Fever    Swollen or enlarged glands in the neck  Diagnosing a dental abscess  An abscess is diagnosed by looking at your teeth and gums. You will be told if any tests, like dental X-rays, are needed.  Treating a dental abscess  Treatments for a dental abscess may include the following:    Antibiotic medicines to treat the underlying infection.    Pain relievers to help you feel more comfortable. Your health care provider may prescribe a medicine for you. Or, use over-the-counter pain relievers, like acetaminophen or ibuprofen.    Warm saltwater rinses to soothe discomfort and help clear away pus.    Root canal surgery if needed to save the tooth. With a root canal, the infected part of the tooth is removed. A special substance is then used to fill the empty space in the tooth.    Drainage of the abscess if needed. Incisions are made to allow the infected material to drain from the tooth.    Removal of the tooth in cases of severe infection that can t be treated another way.  If the infection is severe, has spread, or doesn t respond to treatment, you may need to be admitted to a hospital.        When to call the dentist  Call your dentist right away if you have any of the following:    Fever of 100.4 F (38 C) or higher    Increased pain, redness, drainage, or swelling in the  treated area    Swelling of the face or jawbone    Pain that cannot be controlled with medicines   Preventing dental abscess  To prevent another abscess in the future, keep your teeth clean and healthy. Brush twice a day and floss at least once daily. See your dentist for regular tooth cleanings. And avoid sugary foods and drinks that can lead to tooth decay.  Date Last Reviewed: 7/14/2015 2000-2017 [employer]. [address] [place] [address], [place] [address]. All rights reserved. This information is not intended as a substitute for professional medical care. Always follow your healthcare professional's instructions.

## 2017-06-15 NOTE — ED PROVIDER NOTES
"  History     Chief Complaint   Patient presents with     Dental Pain     Facial Swelling     left side, intermittent     HPI  Jose Alberto Lu is a 42 year old male who was seen in the urgent care on 6/9 for a dental abscess and facial swelling and advised to see a dentist ASAP.  He was given PenVK as an antibiotic.  He has taken the antibiotic, but has not even called a dentist.  He stated he thought he was to wait until the antibiotic was done.  I did advise him to call tomorrow.  There is a firm round mass in the buccal mucosa near tooth #18 that is tender to touch.  He is afebrile and anxious, having palpitations and \"funny feelings\" in abdomen and hands.    I have reviewed the Medications, Allergies, Past Medical and Surgical History, and Social History in the Epic system.    Allergies: No Known Allergies      No current facility-administered medications on file prior to encounter.   Current Outpatient Prescriptions on File Prior to Encounter:  penicillin V potassium (VEETID) 500 MG tablet Take 1 tablet (500 mg) by mouth 4 times daily for 10 days   ibuprofen (ADVIL/MOTRIN) 800 MG tablet Take 1 tablet (800 mg) by mouth every 8 hours as needed for moderate pain   acetaminophen (TYLENOL) 500 MG tablet Take 2 tablets (1,000 mg) by mouth every 6 hours as needed for mild pain       There is no problem list on file for this patient.      History reviewed. No pertinent surgical history.    Social History   Substance Use Topics     Smoking status: Current Every Day Smoker     Packs/day: 0.50     Years: 20.00     Smokeless tobacco: Not on file     Alcohol use No       Most Recent Immunizations   Administered Date(s) Administered     TDAP Vaccine (Adacel) 07/17/2015       BMI: Estimated body mass index is 25.84 kg/(m^2) as calculated from the following:    Height as of 1/11/17: 1.753 m (5' 9\").    Weight as of 1/11/17: 79.4 kg (175 lb).      Review of Systems   Constitutional: Positive for activity change. Negative for " diaphoresis, fatigue and fever.   HENT: Positive for dental problem and facial swelling.         See HPI.   Respiratory: Negative for shortness of breath.    Cardiovascular: Negative for chest pain.   Gastrointestinal: Negative for abdominal pain.   Musculoskeletal: Negative.    Skin: Negative.    Neurological: Negative.    Psychiatric/Behavioral: The patient is nervous/anxious.        Physical Exam   BP: (!) 137/103  Pulse: 75  Temp: 97.7  F (36.5  C)  Resp: 16  SpO2: 98 %  Physical Exam   Constitutional: He is oriented to person, place, and time. He appears well-developed and well-nourished. No distress.   HENT:   Head: Normocephalic and atraumatic.   Neck: Normal range of motion. Neck supple.   Cardiovascular: Normal rate, regular rhythm and normal heart sounds.    No murmur heard.  Pulmonary/Chest: Effort normal and breath sounds normal. No respiratory distress.   Abdominal: Soft. Bowel sounds are normal. He exhibits no distension. There is no tenderness.   Musculoskeletal: Normal range of motion.   Neurological: He is alert and oriented to person, place, and time.   Skin: Skin is warm and dry.   Psychiatric: His mood appears anxious. He expresses inappropriate judgment.   Nursing note and vitals reviewed.      ED Course     ED Course     Procedures        Labs Ordered and Resulted from Time of ED Arrival Up to the Time of Departure from the ED   CBC WITH PLATELETS DIFFERENTIAL - Abnormal; Notable for the following:        Result Value    RBC Count 4.09 (*)     Hemoglobin 12.7 (*)     Hematocrit 38.3 (*)     All other components within normal limits   VITAL SIGNS   PERIPHERAL IV CATHETER       Assessments & Plan (with Medical Decision Making)   Given Clindamycin 900mg IV, and Toradol for pain.  Patient has been re-instructed on calling the dentist and given a limited number of Ultram in a prepak for pain.  Will switch him to Clindamycin since he worsened on the Pen VK.  Follow up with primary care as  needed.    I have reviewed the nursing notes.    I have reviewed the findings, diagnosis, plan and need for follow up with the patient.    New Prescriptions    CLINDAMYCIN (CLEOCIN) 300 MG CAPSULE    Take 1 capsule (300 mg) by mouth 3 times daily for 7 days       Final diagnoses:   Dental abscess       6/15/2017   [facility]     Ana Avilez MD  06/15/17 9567

## 2017-06-15 NOTE — ED AVS SNAPSHOT
[facility]    [address]    [place] [address]    Phone:  [phone]                                       Jose Alberto Lu   MRN: [id number]    Department:  [facility]   Date of Visit:  6/15/2017           Patient Information     Date Of Birth          1975        Your diagnoses for this visit were:     Dental abscess        You were seen by Ana Avilez MD.      Follow-up Information     Follow up with None.    Why:  As needed        Follow up with dentist Today.    Why:  Get appointment for soonest possible visit.  The tooth needs to come out.        Discharge Instructions         Dental Abscess  An abscess is a sac of pus. A dental abscess forms when a tooth or the tissue around it becomes infected with bacteria. The bacteria can enter through a cavity or a crack in a tooth. It can also infect the gum tissue or bone around a tooth. An untreated abscess can cause the loss of the tooth. It can even spread to other parts of the body and become life-threatening.    Symptoms of a dental abscess   Signs of a dental abscess include:    Toothache, often severe    Tooth pain with hot, cold, or pressure    Pain in the gums, cheek, or jaw    Bad breath or bitter taste in the mouth    Trouble swallowing or opening the mouth    Fever    Swollen or enlarged glands in the neck  Diagnosing a dental abscess  An abscess is diagnosed by looking at your teeth and gums. You will be told if any tests, like dental X-rays, are needed.  Treating a dental abscess  Treatments for a dental abscess may include the following:    Antibiotic medicines to treat the underlying infection.    Pain relievers to help you feel more comfortable. Your health care provider may prescribe a medicine for you. Or, use over-the-counter pain relievers, like acetaminophen or ibuprofen.    Warm saltwater rinses to soothe discomfort and help clear away pus.    Root canal surgery if needed to save the  tooth. With a root canal, the infected part of the tooth is removed. A special substance is then used to fill the empty space in the tooth.    Drainage of the abscess if needed. Incisions are made to allow the infected material to drain from the tooth.    Removal of the tooth in cases of severe infection that can t be treated another way.  If the infection is severe, has spread, or doesn t respond to treatment, you may need to be admitted to a hospital.        When to call the dentist  Call your dentist right away if you have any of the following:    Fever of 100.4 F (38 C) or higher    Increased pain, redness, drainage, or swelling in the treated area    Swelling of the face or jawbone    Pain that cannot be controlled with medicines   Preventing dental abscess  To prevent another abscess in the future, keep your teeth clean and healthy. Brush twice a day and floss at least once daily. See your dentist for regular tooth cleanings. And avoid sugary foods and drinks that can lead to tooth decay.  Date Last Reviewed: 7/14/2015 2000-2017 [employer]. [address] [place] [address]. All rights reserved. This information is not intended as a substitute for professional medical care. Always follow your healthcare professional's instructions.             Review of your medicines      START taking        Dose / Directions Last dose taken    clindamycin 300 MG capsule   Commonly known as:  CLEOCIN   Dose:  300 mg   Quantity:  21 capsule        Take 1 capsule (300 mg) by mouth 3 times daily for 7 days   Refills:  0          Our records show that you are taking the medicines listed below. If these are incorrect, please call your family doctor or clinic.        Dose / Directions Last dose taken    acetaminophen 500 MG tablet   Commonly known as:  TYLENOL   Dose:  1000 mg   Quantity:  100 tablet        Take 2 tablets (1,000 mg) by mouth every 6 hours as needed for mild pain   Refills:  0         "ibuprofen 800 MG tablet   Commonly known as:  ADVIL/MOTRIN   Dose:  800 mg   Quantity:  60 tablet        Take 1 tablet (800 mg) by mouth every 8 hours as needed for moderate pain   Refills:  0        penicillin V potassium 500 MG tablet   Commonly known as:  VEETID   Dose:  500 mg   Quantity:  40 tablet        Take 1 tablet (500 mg) by mouth 4 times daily for 10 days   Refills:  0                Prescriptions were sent or printed at these locations (1 Prescription)                   [id number] - [place] - [address] [facility] [place] [address]    Telephone:  [phone]   Fax:  [phone]   Hours:                  E-Prescribed (1 of 1)         clindamycin (CLEOCIN) 300 MG capsule                Procedures and tests performed during your visit     CBC with platelets differential    Peripheral IV catheter    Vital signs      Orders Needing Specimen Collection     None      Pending Results     No orders found from 6/13/2017 to 6/16/2017.            Pending Culture Results     No orders found from 6/13/2017 to 6/16/2017.            Thank you for choosing [facility]       Thank you for choosing [facility] for your care. Our goal is always to provide you with excellent care. Hearing back from our patients is one way we can continue to improve our services. Please take a few minutes to complete the written survey that you may receive in the mail after you visit with us. Thank you!        [employer] Information     [employer] lets you send messages to your doctor, view your test results, renew your prescriptions, schedule appointments and more. To sign up, go to [email or web address] . Click on \"Log in\" on the left side of the screen, which will take you to the Welcome page. Then click on \"Sign up Now\" on the right side of the page.     You will be asked to enter the access code listed below, as well as some personal information. Please follow the directions to create your username and " password.     Your access code is: [email or web address]  Expires: 2017 11:08 AM     Your access code will  in 90 days. If you need help or a new code, please call your [facility] clinic or [phone].        Care EveryWhere ID     This is your Care EveryWhere ID. This could be used by other organizations to access your [facility] medical records  [id number]        After Visit Summary       This is your record. Keep this with you and show to your community pharmacist(s) and doctor(s) at your next visit.

## 2017-06-15 NOTE — ED NOTES
Pt given verbal and written d/c instructions and pt verbalizes understanding. Pt advised that he needs to schedule an appointment with a dentist ASAP and was also advised that a note was sent to social workers to assist. Pt advised to stop taking the penicillin antibiotic that was previously prescribed and start taking the cleocin. Pt given TH pack of 6 tramadol.

## 2017-06-27 ENCOUNTER — HOSPITAL ENCOUNTER (EMERGENCY)
Facility: HOSPITAL | Age: 42
Discharge: HOME | End: 2017-06-27
Attending: PHYSICIAN ASSISTANT | Admitting: PHYSICIAN ASSISTANT

## 2017-06-27 VITALS
TEMPERATURE: 98.4 F | DIASTOLIC BLOOD PRESSURE: 85 MMHG | HEART RATE: 95 BPM | SYSTOLIC BLOOD PRESSURE: 116 MMHG | RESPIRATION RATE: 20 BRPM

## 2017-06-27 DIAGNOSIS — L03.211 CELLULITIS, FACE: ICD-10-CM

## 2017-06-27 DIAGNOSIS — K04.7 DENTAL ABSCESS: ICD-10-CM

## 2017-06-27 LAB
ALBUMIN SERPL-MCNC: 3.9 G/DL (ref 3.4–5)
ALP SERPL-CCNC: 93 U/L (ref 40–150)
ALT SERPL W P-5'-P-CCNC: 29 U/L (ref 0–70)
ANION GAP SERPL CALCULATED.3IONS-SCNC: 8 MMOL/L (ref 3–14)
AST SERPL W P-5'-P-CCNC: 16 U/L (ref 0–45)
BASOPHILS # BLD AUTO: 0 10E9/L (ref 0–0.2)
BASOPHILS NFR BLD AUTO: 0.2 %
BILIRUB SERPL-MCNC: 0.7 MG/DL (ref 0.2–1.3)
BUN SERPL-MCNC: 15 MG/DL (ref 7–30)
CALCIUM SERPL-MCNC: 9.4 MG/DL (ref 8.5–10.1)
CHLORIDE SERPL-SCNC: 103 MMOL/L (ref 94–109)
CO2 SERPL-SCNC: 26 MMOL/L (ref 20–32)
CREAT SERPL-MCNC: 0.86 MG/DL (ref 0.66–1.25)
DIFFERENTIAL METHOD BLD: NORMAL
EOSINOPHIL # BLD AUTO: 0.1 10E9/L (ref 0–0.7)
EOSINOPHIL NFR BLD AUTO: 1.2 %
ERYTHROCYTE [DISTWIDTH] IN BLOOD BY AUTOMATED COUNT: 13 % (ref 10–15)
GFR SERPL CREATININE-BSD FRML MDRD: ABNORMAL ML/MIN/1.7M2
GLUCOSE SERPL-MCNC: 103 MG/DL (ref 70–99)
HCT VFR BLD AUTO: 40.2 % (ref 40–53)
HGB BLD-MCNC: 13.9 G/DL (ref 13.3–17.7)
IMM GRANULOCYTES # BLD: 0 10E9/L (ref 0–0.4)
IMM GRANULOCYTES NFR BLD: 0.3 %
LYMPHOCYTES # BLD AUTO: 1.6 10E9/L (ref 0.8–5.3)
LYMPHOCYTES NFR BLD AUTO: 16.9 %
MCH RBC QN AUTO: 31.2 PG (ref 26.5–33)
MCHC RBC AUTO-ENTMCNC: 34.6 G/DL (ref 31.5–36.5)
MCV RBC AUTO: 90 FL (ref 78–100)
MONOCYTES # BLD AUTO: 0.7 10E9/L (ref 0–1.3)
MONOCYTES NFR BLD AUTO: 6.8 %
NEUTROPHILS # BLD AUTO: 7.1 10E9/L (ref 1.6–8.3)
NEUTROPHILS NFR BLD AUTO: 74.6 %
NRBC # BLD AUTO: 0 10*3/UL
NRBC BLD AUTO-RTO: 0 /100
PLATELET # BLD AUTO: 313 10E9/L (ref 150–450)
POTASSIUM SERPL-SCNC: 4 MMOL/L (ref 3.4–5.3)
PROT SERPL-MCNC: 7.9 G/DL (ref 6.8–8.8)
RBC # BLD AUTO: 4.46 10E12/L (ref 4.4–5.9)
SODIUM SERPL-SCNC: 137 MMOL/L (ref 133–144)
WBC # BLD AUTO: 9.5 10E9/L (ref 4–11)

## 2017-06-27 PROCEDURE — 25000128 H RX IP 250 OP 636: Performed by: PHYSICIAN ASSISTANT

## 2017-06-27 PROCEDURE — 36415 COLL VENOUS BLD VENIPUNCTURE: CPT | Performed by: PHYSICIAN ASSISTANT

## 2017-06-27 PROCEDURE — 80053 COMPREHEN METABOLIC PANEL: CPT | Performed by: PHYSICIAN ASSISTANT

## 2017-06-27 PROCEDURE — 96367 TX/PROPH/DG ADDL SEQ IV INF: CPT

## 2017-06-27 PROCEDURE — 99214 OFFICE O/P EST MOD 30 MIN: CPT | Mod: 25

## 2017-06-27 PROCEDURE — 85025 COMPLETE CBC W/AUTO DIFF WBC: CPT | Performed by: PHYSICIAN ASSISTANT

## 2017-06-27 PROCEDURE — 96365 THER/PROPH/DIAG IV INF INIT: CPT

## 2017-06-27 PROCEDURE — 99214 OFFICE O/P EST MOD 30 MIN: CPT | Performed by: PHYSICIAN ASSISTANT

## 2017-06-27 PROCEDURE — 25000125 ZZHC RX 250: Performed by: PHYSICIAN ASSISTANT

## 2017-06-27 PROCEDURE — 70487 CT MAXILLOFACIAL W/DYE: CPT | Mod: TC

## 2017-06-27 PROCEDURE — 96375 TX/PRO/DX INJ NEW DRUG ADDON: CPT

## 2017-06-27 RX ORDER — IOPAMIDOL 612 MG/ML
100 INJECTION, SOLUTION INTRAVASCULAR ONCE
Status: COMPLETED | OUTPATIENT
Start: 2017-06-27 | End: 2017-06-27

## 2017-06-27 RX ORDER — KETOROLAC TROMETHAMINE 30 MG/ML
30 INJECTION, SOLUTION INTRAMUSCULAR; INTRAVENOUS ONCE
Status: COMPLETED | OUTPATIENT
Start: 2017-06-27 | End: 2017-06-27

## 2017-06-27 RX ORDER — METRONIDAZOLE 500 MG/1
500 TABLET ORAL 4 TIMES DAILY
Qty: 28 TABLET | Refills: 0 | Status: SHIPPED | OUTPATIENT
Start: 2017-06-27 | End: 2017-07-04

## 2017-06-27 RX ORDER — AMPICILLIN AND SULBACTAM 2; 1 G/1; G/1
3 INJECTION, POWDER, FOR SOLUTION INTRAMUSCULAR; INTRAVENOUS ONCE
Status: DISCONTINUED | OUTPATIENT
Start: 2017-06-27 | End: 2017-06-27

## 2017-06-27 RX ADMIN — IOPAMIDOL 100 ML: 612 INJECTION, SOLUTION INTRAVASCULAR at 18:29

## 2017-06-27 RX ADMIN — SODIUM CHLORIDE 3 G: 9 INJECTION, SOLUTION INTRAVENOUS at 21:07

## 2017-06-27 RX ADMIN — METRONIDAZOLE 500 MG: 500 INJECTION, SOLUTION INTRAVENOUS at 20:05

## 2017-06-27 RX ADMIN — KETOROLAC TROMETHAMINE 30 MG: 30 INJECTION, SOLUTION INTRAMUSCULAR at 20:09

## 2017-06-27 ASSESSMENT — ENCOUNTER SYMPTOMS
NECK PAIN: 0
CONSTITUTIONAL NEGATIVE: 1
NECK STIFFNESS: 0
CARDIOVASCULAR NEGATIVE: 1
PSYCHIATRIC NEGATIVE: 1
WOUND: 1
NEUROLOGICAL NEGATIVE: 1

## 2017-06-27 NOTE — ED PROVIDER NOTES
"  History     Chief Complaint   Patient presents with     Dental Problem     The history is provided by the patient. No  was used.     Jose Alberto Lu is a 42 year old male who has continued tooth # 18 pain. Left cheek swollen even more now. This is the third time in the last month he has been evaluated and treated for this.  Pt was seen last in ED 15 Luna 2017. Was given IV clindamycin and then oral.  The last oral does was 3 days ago. Pt states the pain and swelling ar worse now. No fever. Has had chills    I have reviewed the Medications, Allergies, Past Medical and Surgical History, and Social History in the Epic system.    Allergies: No Known Allergies      No current facility-administered medications on file prior to encounter.   Current Outpatient Prescriptions on File Prior to Encounter:  acetaminophen (TYLENOL) 500 MG tablet Take 2 tablets (1,000 mg) by mouth every 6 hours as needed for mild pain       There is no problem list on file for this patient.      History reviewed. No pertinent surgical history.    Social History   Substance Use Topics     Smoking status: Current Every Day Smoker     Packs/day: 0.50     Years: 20.00     Smokeless tobacco: Not on file     Alcohol use No       Most Recent Immunizations   Administered Date(s) Administered     TDAP Vaccine (Adacel) 07/17/2015       BMI: Estimated body mass index is 25.84 kg/(m^2) as calculated from the following:    Height as of 1/11/17: 1.753 m (5' 9\").    Weight as of 1/11/17: 79.4 kg (175 lb).      Review of Systems   Constitutional: Negative.    Cardiovascular: Negative.    Musculoskeletal: Negative for neck pain and neck stiffness.   Skin: Positive for wound.   Neurological: Negative.    Psychiatric/Behavioral: Negative.        Physical Exam   BP: 116/85  Pulse: 95  Temp: 98.4  F (36.9  C)  Resp: 20  Physical Exam   Constitutional: He is oriented to person, place, and time. He appears well-developed and well-nourished. No " distress.   HENT:   Left cheek has firm, edema/erythema and great TTP. Area approx 3.5 cm x 3.5 cm   Cardiovascular: Normal rate.    Pulmonary/Chest: Effort normal.   Neurological: He is alert and oriented to person, place, and time.   Skin: He is not diaphoretic.   Psychiatric: He has a normal mood and affect.   Nursing note and vitals reviewed.      ED Course     ED Course     Procedures          Labs Ordered and Resulted from Time of ED Arrival Up to the Time of Departure from the ED   COMPREHENSIVE METABOLIC PANEL - Abnormal; Notable for the following:        Result Value    Glucose 103 (*)     All other components within normal limits   CBC WITH PLATELETS DIFFERENTIAL   MAY SALINE LOCK IV     Tetanus 2015    Maxillofacial CT w/ IV contrast: Left facial cellulitis and phlegmon with developing lobulated and encapsulated abscess measuring up to 14 mm in the SQ fat.  It is infra lateral to the masseter.    I consulted with [facility] provider, Dr. NORMA Barcenas. He reviewed the Maxillofacial CT w/ IV contrast.  He feels this is an extension of a dental abscess. See image # 13 on the axial view.   He suggests I speak with Oral Surgery.    The [facility] stat doc attempted to contact Dr. Whitman, On-Call. No answer, left message    I called [facility] stat doc. Dr. Whitman was on call for them too. No answer. A partner we contacted. Dr. Ricks. I discussed the case and the CT results. He suggests Unasyn 3 g IV and Flagyl 500 mg IV. Then, f/u tomorrow in [place] at the Oral Surgery Clinic.    Medications   iopamidol (ISOVUE-300) IV solution 61% 100 mL (100 mLs Intravenous Given 6/27/17 1829)   sodium chloride (PF) 0.9% PF flush 60 mL (60 mLs Intravenous Given 6/27/17 1829)   metroNIDAZOLE (FLAGYL) infusion 500 mg (0 mg Intravenous Stopped 6/27/17 2104)   ketorolac (TORADOL) injection 30 mg (30 mg Intravenous Given 6/27/17 2009)   ampicillin-sulbactam (UNASYN) 3 g in NaCl 0.9 % 100 mL intermittent infusion (0 g  Intravenous Stopped 6/27/17 2150)     Assessments & Plan (with Medical Decision Making)     I have reviewed the nursing notes.    I have reviewed the findings, diagnosis, plan and need for follow up with the patient.      Discharge Medication List as of 6/27/2017 10:13 PM      START taking these medications    Details   metroNIDAZOLE (FLAGYL) 500 MG tablet Take 1 tablet (500 mg) by mouth 4 times daily for 7 days, Disp-28 tablet, R-0, E-PrescribeEat yogurt or cottage cheese daily to prevent diarrhea that can be caused by taking this antibiotic.      amoxicillin-clavulanate (AUGMENTIN) 875-125 MG per tablet Take 1 tablet by mouth 2 times daily, Disp-20 tablet, R-0, E-Prescribe             Final diagnoses:   Cellulitis, face   Dental abscess         I consulted with [facility] provider, Dr. NORMA Barcenas. He reviewed the Maxillofacial CT w/ IV contrast.  He feels this is an extension of a dental abscess. See image # 13 on the axial view.   He suggests I speak with Oral Surgery.    The [facility] stat doc attempted to contact Dr. Whitman, On-Call. No answer, left message    I called [facility] stat doc. Dr. Whitman was on call for them too. No answer. A partner we contacted. Dr. Ricks. I discussed the case and the CT results. He suggests Unasyn 3 g IV and Flagyl 500 mg IV. Then, f/u tomorrow in [place] at the Oral Surgery Clinic. Pt to call tomorrow at 0800, [phone].       Patient verbally educated and given appropriate education sheets for the diagnoses and has no questions.  Take medications as directed.   if further concerns develop, return to the ER  aCrol Limon Certified  Physician Assistant  6/27/2017  11:17 PM  URGENT CARE CLINIC        6/27/2017   [facility]     Carol Limon PA  06/27/17 6120       Carol Limon PA  06/28/17 0003

## 2017-06-27 NOTE — DISCHARGE INSTRUCTIONS
At 0800 tomorrow, call [phone]. Tell them you were seen in Urgent Care and a Consult was ordered. Dr. Ricks was consulted and said you should be reevaluated, tomorrow, 28 June 2017. You have a dental abscess and facial cellulitis.              What to expect when you have contrast    During your exam, we will inject  contrast  into your vein or artery. (Contrast is a clear liquid with iodine in it. It shows up on X-rays.)    You may feel warm or hot. You may have a metal taste in your mouth and a slight upset stomach. You may also feel pressure near the kidneys and bladder. These effects will last about 1 to 3 minutes.    Please tell us if you have:    Sneezing     Itching    Hives     Swelling in the face    A hoarse voice    Breathing problems    Other new symptoms    Serious problems are rare.  They may include:    Irregular heartbeat     Seizures    Kidney failure              Tissue damage    Shock      Death    If you have any problems during the exam, we  will treat them right away.    When you get home    Call your hospital if you have any new symptoms in the next 2 days, like hives or swelling. (Phone numbers are at the bottom of this page.) Or call your family doctor.     If you have wheezing or trouble breathing, call 911.    Self-care  -Drink at least 4 extra glasses of water today.   This reduces the stress on your kidneys.  -Keep taking your regular medicines.    The contrast will pass out of your body in your  Urine(pee). This will happen in the next 24 hours. You  will not feel this. Your urine will not  change color.    If you have kidney problems or take metformin    Drink 4 to 8 large glasses of water for the next  2 days, if you are not on a fluid restriction.    ?If you take metformin (Glucophage or Glucovance) for diabetes, keep taking it.      ?Your kidney function tests are abnormal.  If you take Metformin, do not take it for 48 hours. Please go to your clinic for a blood test within 3  days after your exam before the restarting this medicine.     (Note to provider:please give patient prescription for lab tests.)    ?Special instructions: DRINK EXTRA WATER TODAY    I have read and understand the above information.    Patient Sign Here:______________________________________Date:________Time:______    Staff Sign Here:________________________________________Date:_______Time:______      Radiology Departments:     ?[facility]: [phone] ?[facility]: [phone]     ?[facility]: [phone] ?[facility]:[phone]      ?[facility]: [phone]  ?[facility]: [phone]  ?[facility]:[phone]    ?[facility]:[phone]  ?[facility]:[phone]

## 2017-06-27 NOTE — ED AVS SNAPSHOT
[facility]    [address] [address]    Phone:  [phone]                                       Jose Alberto Lu   MRN: [id number]    Department:  [facility]   Date of Visit:  6/27/2017           Patient Information     Date Of Birth          1975        Your diagnoses for this visit were:     Cellulitis, face     Dental abscess        You were seen by Carol Limon PA.      Follow-up Information     Follow up In 1 day.    Why:  [facility]          Follow up with [facility].    Specialty:  EMERGENCY MEDICINE    Why:  If further concerns develop    Contact information:    [address] [address] [phone]    Additional information:    From [facility]: Take [address]. Turn left at [address]. Turn left at the first stoplight on [address] [place] [address]. At the first stop sign, take a right onto [address]. Take a left into the parking lot and continue through until you reach the North enterance of the building.       From [place]: Take [address]. Take the [address] ramp towards [place]. Turn left onto [address]. Take a slight right onto [address]. Turn left at the first stoplight on [address]. At the first stop sign, take a right onto [address]. Take a left into the parking lot and continue through until you reach the North enterance of the building.       From [place]: Take [address]. Take a right at [address]. At the first stop sign, take a right onto [address]. Take a left into the parking lot and continue through until you reach the North enterance of the building.         Discharge Instructions       At 0800 tomorrow, call [phone]. Tell them you were seen in Urgent Care and a Consult was ordered. Dr. Ricks was consulted and said you should be reevaluated, tomorrow, 28 June 2017. You have a dental abscess  and facial cellulitis.              What to expect when you have contrast    During your exam, we will inject  contrast  into your vein or artery. (Contrast is a clear liquid with iodine in it. It shows up on X-rays.)    You may feel warm or hot. You may have a metal taste in your mouth and a slight upset stomach. You may also feel pressure near the kidneys and bladder. These effects will last about 1 to 3 minutes.    Please tell us if you have:    Sneezing     Itching    Hives     Swelling in the face    A hoarse voice    Breathing problems    Other new symptoms    Serious problems are rare.  They may include:    Irregular heartbeat     Seizures    Kidney failure              Tissue damage    Shock      Death    If you have any problems during the exam, we  will treat them right away.    When you get home    Call your hospital if you have any new symptoms in the next 2 days, like hives or swelling. (Phone numbers are at the bottom of this page.) Or call your family doctor.     If you have wheezing or trouble breathing, call 911.    Self-care  -Drink at least 4 extra glasses of water today.   This reduces the stress on your kidneys.  -Keep taking your regular medicines.    The contrast will pass out of your body in your  Urine(pee). This will happen in the next 24 hours. You  will not feel this. Your urine will not  change color.    If you have kidney problems or take metformin    Drink 4 to 8 large glasses of water for the next  2 days, if you are not on a fluid restriction.    ?If you take metformin (Glucophage or Glucovance) for diabetes, keep taking it.      ?Your kidney function tests are abnormal.  If you take Metformin, do not take it for 48 hours. Please go to your clinic for a blood test within 3 days after your exam before the restarting this medicine.     (Note to provider:please give patient prescription for lab tests.)    ?Special instructions: DRINK EXTRA WATER TODAY    I have read and understand the  above information.    Patient Sign Here:______________________________________Date:________Time:______    Staff Sign Here:________________________________________Date:_______Time:______      Radiology Departments:     ?[facility]: [phone] ?[facility]: [phone]     ?[place]: [phone] ?[facility]:[phone]      ?[facility]: [phone]  ?[facility]: [phone]  ?[facility]:[phone]    ?[facility]:[phone]  ?[facility]:[phone]    Discharge References/Attachments     ABSCESS, DENTAL (ENGLISH)      ED Discharge Orders     ORAL SURGERY REFERRAL       Your provider has referred you to: {[facility]      Please be aware that coverage of these services is subject to the terms and limitations of your health insurance plan.  Call member services at your health plan with any benefit or coverage questions.      Please bring the following to your appointment:    >>   Any x-rays, CTs or MRIs which have been performed.  Contact the facility where they were done to arrange for  prior to your scheduled appointment.    >>   List of current medications   >>   This referral request   >>   Any documents/labs given to you for this referral                     Review of your medicines      START taking        Dose / Directions Last dose taken    amoxicillin-clavulanate 875-125 MG per tablet   Commonly known as:  AUGMENTIN   Dose:  1 tablet   Quantity:  20 tablet        Take 1 tablet by mouth 2 times daily   Refills:  0        metroNIDAZOLE 500 MG tablet   Commonly known as:  FLAGYL   Dose:  500 mg   Quantity:  28 tablet        Take 1 tablet (500 mg) by mouth 4 times daily for 7 days   Refills:  0          Our records show that you are taking the medicines listed below. If these are incorrect, please call your family doctor or clinic.        Dose / Directions Last dose taken    acetaminophen 500 MG tablet   Commonly known as:  TYLENOL   Dose:  1000 mg   Quantity:  100 tablet        Take 2  "tablets (1,000 mg) by mouth every 6 hours as needed for mild pain   Refills:  0                Prescriptions were sent or printed at these locations (2 Prescriptions)                   [id number] - [place], [place] - [address]   [address] [place] [address]    Telephone:  [phone]   Fax:  [phone]   Hours:                  E-Prescribed (2 of 2)         metroNIDAZOLE (FLAGYL) 500 MG tablet               amoxicillin-clavulanate (AUGMENTIN) 875-125 MG per tablet                Procedures and tests performed during your visit     CBC with platelets differential    CT Maxillofacial w Contrast    Comprehensive metabolic panel    Saline Lock IV      Orders Needing Specimen Collection     None      Pending Results     Date and Time Order Name Status Description    2017 1754 CT Maxillofacial w Contrast In process             Pending Culture Results     No orders found from 2017 to 2017.            Thank you for choosing [facility]       Thank you for choosing [facility] for your care. Our goal is always to provide you with excellent care. Hearing back from our patients is one way we can continue to improve our services. Please take a few minutes to complete the written survey that you may receive in the mail after you visit with us. Thank you!        [employer] Information     [employer] lets you send messages to your doctor, view your test results, renew your prescriptions, schedule appointments and more. To sign up, go to [email or web address] . Click on \"Log in\" on the left side of the screen, which will take you to the Welcome page. Then click on \"Sign up Now\" on the right side of the page.     You will be asked to enter the access code listed below, as well as some personal information. Please follow the directions to create your username and password.     Your access code is: [email or web address]  Expires: 2017 11:08 AM     Your access code will  in 90 days. If you need help or " a new code, please call your [facility] clinic or [phone].        Care EveryWhere ID     This is your Care EveryWhere ID. This could be used by other organizations to access your [facility] medical records  [id number]        Equal Access to Services     ZABRINA WAN : Maryanne Hameed, waleilani lopez, qamitchell kaalgabino dunn, guido montoya. So [facility] [phone].    ATENCIÓN: Si habla español, tiene a hernández disposición servicios gratuitos de asistencia lingüística. Llame al [phone].    We comply with applicable federal civil rights laws and [place] laws. We do not discriminate on the basis of race, color, national origin, age, disability sex, sexual orientation or gender identity.            After Visit Summary       This is your record. Keep this with you and show to your community pharmacist(s) and doctor(s) at your next visit.

## 2017-06-27 NOTE — ED AVS SNAPSHOT
[facility]    [address] [place] [address]    Phone:  [phone]                                       Jose Alberto Lu   MRN: [id number]    Department[facility]  [facility]   Date of Visit:  6/27/2017           After Visit Summary Signature Page     I have received my discharge instructions, and my questions have been answered. I have discussed any challenges I see with this plan with the nurse or doctor.    ..........................................................................................................................................  Patient/Patient Representative Signature      ..........................................................................................................................................  Patient Representative Print Name and Relationship to Patient    ..................................................               ................................................  Date                                            Time    ..........................................................................................................................................  Reviewed by Signature/Title    ...................................................              ..............................................  Date                                                            Time

## 2017-06-27 NOTE — ED NOTES
Pt presents with swelling and pain to left cheek X last 2 days. Pt has had dental pain for 2 weeks. The left 2nd to the last molar on the lower jaw is the tooth that is infected and the filling is out.

## 2017-06-28 NOTE — ED NOTES
IV site without redness,swelling or pain. Unasyn infusing without difficulty. Pt tolerating well. Ate lunch. Pt states that he is feeling okay.

## 2017-06-28 NOTE — ED NOTES
Resting; states he is feeling much better.  Pain 3/10, was able to eat something.  No new complaints at this time.

## 2017-06-28 NOTE — PROGRESS NOTES
Facial CT report faxed to Dr Ricks Oral Surgeon: IMPRESSION:  LEFT MANDIBULAR CELLULITIS WITH A 1.5 CM ABSCESS INVOLVING THE SUPERFICIAL AND DEEP TISSUES.  PORTIONS OF THE ABSCESS APPEAR TO EXTEND INTO THE LEFT MASSETER MUSCLE.  Augmentin and Flagyl prescribed. Oral Surgeon 's office contacted and stated pt has already been seen this morning.

## 2018-02-22 ENCOUNTER — HOSPITAL ENCOUNTER (EMERGENCY)
Facility: HOSPITAL | Age: 43
Discharge: HOME | End: 2018-02-22
Attending: NURSE PRACTITIONER | Admitting: NURSE PRACTITIONER
Payer: MEDICAID

## 2018-02-22 VITALS
DIASTOLIC BLOOD PRESSURE: 85 MMHG | SYSTOLIC BLOOD PRESSURE: 132 MMHG | HEART RATE: 98 BPM | TEMPERATURE: 98.9 F | OXYGEN SATURATION: 97 % | RESPIRATION RATE: 16 BRPM

## 2018-02-22 DIAGNOSIS — S16.1XXA STRAIN OF NECK MUSCLE, INITIAL ENCOUNTER: ICD-10-CM

## 2018-02-22 PROCEDURE — G0463 HOSPITAL OUTPT CLINIC VISIT: HCPCS

## 2018-02-22 PROCEDURE — 99213 OFFICE O/P EST LOW 20 MIN: CPT | Performed by: NURSE PRACTITIONER

## 2018-02-22 RX ORDER — NAPROXEN 500 MG/1
500 TABLET ORAL 2 TIMES DAILY WITH MEALS
Qty: 20 TABLET | Refills: 0 | Status: SHIPPED | OUTPATIENT
Start: 2018-02-22 | End: 2018-07-12

## 2018-02-22 RX ORDER — CYCLOBENZAPRINE HCL 10 MG
10 TABLET ORAL 2 TIMES DAILY PRN
Qty: 20 TABLET | Refills: 0 | Status: SHIPPED | OUTPATIENT
Start: 2018-02-22 | End: 2018-03-28

## 2018-02-22 ASSESSMENT — ENCOUNTER SYMPTOMS
NECK PAIN: 1
FEVER: 0
RHINORRHEA: 0
ABDOMINAL PAIN: 0
HEADACHES: 0
COUGH: 0
NECK STIFFNESS: 1

## 2018-02-22 NOTE — ED AVS SNAPSHOT
[facility]    [address] [place] [address]    Phone:  [phone]                                       Jose Alberto Lu   MRN: [id number]    Department[facility]  [facility]   Date of Visit:  2/22/2018           After Visit Summary Signature Page     I have received my discharge instructions, and my questions have been answered. I have discussed any challenges I see with this plan with the nurse or doctor.    ..........................................................................................................................................  Patient/Patient Representative Signature      ..........................................................................................................................................  Patient Representative Print Name and Relationship to Patient    ..................................................               ................................................  Date                                            Time    ..........................................................................................................................................  Reviewed by Signature/Title    ...................................................              ..............................................  Date                                                            Time

## 2018-02-22 NOTE — ED AVS SNAPSHOT
[facility]    [address]    [place] [address]    Phone:  [phone]                                       Jose Alberto Lu   MRN: [id number]    Department[facility]  [facility]   Date of Visit:  2/22/2018           Patient Information     Date Of Birth          1975        Your diagnoses for this visit were:     Strain of neck muscle, initial encounter        You were seen by Alayna Payan NP.      Follow-up Information     Please follow up.    Why:  The  will call you with a follow up appointment in the clinic        Discharge Instructions         Understanding Cervical Strain    There are 7 bones (vertebrae) in the neck that are part of the spine. These are called the cervical spine. Cervical strain is a medical term for neck pain. The neck has several layers of muscles. These are connected with tendons to the cervical spine and other bones. Neck pain is often the result of injury to these muscles and tendons.  Causes of cervical strain  Different types of stress on the neck can damage muscles and tendons (soft tissues) and cause cervical strain. Cervical tissues can be damaged by:    The neck being forced past its normal range of motion, such as in a car accident or sports injury    Constant, low-level stress, such as from poor posture or a poorly set-up workspace  Symptoms of cervical strain  These may include:    Neck pain or stiffness    Pain in the shoulders or upper back    Muscle spasms    Headache, often starting at the base of the neck    Irritability, difficulty concentrating, or sleeplessness  Treatment for cervical strain  This problem often gets better on its own. Treatments aim to reduce pain and inflammation and increase the range of motion of the neck. Possible treatments include:    Over-the-counter or prescription pain medicine. These help relieve pain and inflammation.    Stretching exercises to decrease neck stiffness.    Massage to  decrease neck stiffness.    Cold or heat pack. These help reduce pain and swelling.  Call 911  Call emergency services right away if you have any of these:    Face drooping or numbness    Numbness or weakness, especially in the arms or on one side    Slurred speech or difficulty speaking    Blurred vision   When to call your healthcare provider  Call your healthcare provider right away if you have any of these:    Fever of 100.4 F (38 C) or higher, or as directed    Pain or stiffness that gets worse    Symptoms that don t get better, or get worse    Numbness, tingling, weakness or shooting pains into the arms or legs    New symptoms  Date Last Reviewed: 3/10/2016    5218-5615 [employer]. [address] [place] [address]. All rights reserved. This information is not intended as a substitute for professional medical care. Always follow your healthcare professional's instructions.          Head Tilt / Upper Trapezius Stretch (Flexibility)    1. Sit up straight in a chair with your head and neck in a neutral position, ears in line with shoulders. Hold the edge of your chair seat with your right hand. Tuck your chin in slightly.  2. Tilt your head to the left, while looking straight ahead.  3. Put your left hand on the right side of your head. Gently pull your head to the left. Hold for 30 to 60 seconds. Use gentle pressure to increase the stretch. Don t force your head into position.  4. Return your head and neck to the neutral position.  5. Repeat this exercise 2 times, or as instructed.  6. Switch sides and repeat 2 times, or as instructed.  Challenge yourself  Tuck one end of a towel under your left arm. Then bring the other end over your right shoulder. Pull the towel down on your right shoulder with both hands as you side-bend your head to the left. Repeat with the other side.   Date Last Reviewed: 3/10/2016    0517-7686 [employer]. [address] [place] [address].  "All rights reserved. This information is not intended as a substitute for professional medical care. Always follow your healthcare professional's instructions.             Review of your medicines      START taking        Dose / Directions Last dose taken    cyclobenzaprine 10 MG tablet   Commonly known as:  FLEXERIL   Dose:  10 mg   Quantity:  20 tablet        Take 1 tablet (10 mg) by mouth 2 times daily as needed for muscle spasms   Refills:  0        naproxen 500 MG tablet   Commonly known as:  NAPROSYN   Dose:  500 mg   Quantity:  20 tablet        Take 1 tablet (500 mg) by mouth 2 times daily (with meals)   Refills:  0                Prescriptions were sent or printed at these locations (2 Prescriptions)                   [id number] - [place], [place] - [address]   [address] [place] [address] [place] [address]    Telephone:  [phone]   Fax:  [phone]   Hours:                  E-Prescribed (2 of 2)         cyclobenzaprine (FLEXERIL) 10 MG tablet               naproxen (NAPROSYN) 500 MG tablet                Orders Needing Specimen Collection     None      Pending Results     No orders found from 2/20/2018 to 2/23/2018.            Pending Culture Results     No orders found from 2/20/2018 to 2/23/2018.            Thank you for choosing [facility]       Thank you for choosing [facility] for your care. Our goal is always to provide you with excellent care. Hearing back from our patients is one way we can continue to improve our services. Please take a few minutes to complete the written survey that you may receive in the mail after you visit with us. Thank you!        [employer] Information     [employer] lets you send messages to your doctor, view your test results, renew your prescriptions, schedule appointments and more. To sign up, go to [email or web address] . Click on \"Log in\" on the left side of the screen, which will take you to the Welcome page. Then click on \"Sign up Now\" on the right side of the " page.     You will be asked to enter the access code listed below, as well as some personal information. Please follow the directions to create your username and password.     Your access code is: [email or web address]  Expires: 2018  6:51 PM     Your access code will  in 90 days. If you need help or a new code, please call your [facility] clinic or [phone].        Care EveryWhere ID     This is your Care EveryWhere ID. This could be used by other organizations to access your [facility] medical records  [id number]        Equal Access to Services     [facility]: Maryanne Hameed, maxine lopez, deya dunn, guido vu . So [facility] [phone].    ATENCIÓN: Si habla español, tiene a hernández disposición servicios gratuitos de asistencia lingüística. Llame al [phone].    We comply with applicable federal civil rights laws and [place] laws. We do not discriminate on the basis of race, color, national origin, age, disability, sex, sexual orientation, or gender identity.            After Visit Summary       This is your record. Keep this with you and show to your community pharmacist(s) and doctor(s) at your next visit.

## 2018-02-23 NOTE — ED NOTES
"PT presents today with c/o right sided neck pain, states \"i have a cyst\", pt also c/o lower back pain, along with Left groin hernia that has possible infection.  "

## 2018-02-23 NOTE — ED PROVIDER NOTES
History     Chief Complaint   Patient presents with     Neck Pain     The history is provided by the patient. No  was used.     Jose Alberto Lu is a 42 year old male who presents with pain in his neck that has been getting worse for the past couple weeks. Has had increased pain with this for 2-3 weeks. Thinks it is related to a cyst that was found in a CT scan he had here a couple years ago.   Does a lot of heavy lifting, doesn't work at a specific job. Has taken nothing for pain. Reports Tylenol and ibuprofen generally don't help, he does get relief with Ultram or Lortab that he had gotten a couple years ago with a different type of injury.     Problem List:    There are no active problems to display for this patient.       Past Medical History:    No past medical history on file.    Past Surgical History:    No past surgical history on file.    Family History:    No family history on file.    Social History:  Marital Status:  Single [1]  Social History   Substance Use Topics     Smoking status: Current Every Day Smoker     Packs/day: 0.50     Years: 20.00     Smokeless tobacco: Not on file     Alcohol use No        Medications:      cyclobenzaprine (FLEXERIL) 10 MG tablet   naproxen (NAPROSYN) 500 MG tablet         Review of Systems   Constitutional: Negative for fever.   HENT: Negative for ear pain and rhinorrhea.    Respiratory: Negative for cough.    Gastrointestinal: Negative for abdominal pain.   Musculoskeletal: Positive for neck pain and neck stiffness.   Skin: Negative.    Neurological: Negative for headaches.       Physical Exam   BP: 132/85  Pulse: 98  Temp: 98.9  F (37.2  C)  Resp: 16  SpO2: 97 %      Physical Exam   Constitutional: He appears well-developed and well-nourished. No distress.   Pupils dilated, smells of marijuana.   HENT:   Head: Normocephalic and atraumatic.   Right Ear: Tympanic membrane and external ear normal.   Left Ear: Tympanic membrane and external ear  normal.   Nose: Nose normal.   Eyes: Lids are normal. Right conjunctiva is not injected. Left conjunctiva is not injected. No scleral icterus.   Neck: Neck supple. Muscular tenderness present. No spinous process tenderness present. No rigidity. Decreased range of motion present. No edema and no erythema present.   Limited ROM with rotating to the left, he has full forward extension, limited with backward flexion. He is hyper-responsive to light touch. Has irregular jerking movements in arms and neck during exam.   Cardiovascular: Regular rhythm and normal heart sounds.  Tachycardia present.    Pulmonary/Chest: Effort normal and breath sounds normal. He has no decreased breath sounds. He has no wheezes.   Lymphadenopathy:        Head (right side): No submental, no submandibular, no tonsillar, no preauricular, no posterior auricular and no occipital adenopathy present.        Head (left side): No submental, no submandibular, no tonsillar, no preauricular, no posterior auricular and no occipital adenopathy present.   Neurological: He is alert.   Skin: He is not diaphoretic.   Nursing note and vitals reviewed.      ED Course     ED Course     Procedures    Assessments & Plan (with Medical Decision Making)     I have reviewed the nursing notes.  I have reviewed the findings, diagnosis, plan and need for follow up with the patient.  Advised pt, narcotics were not necessary for MSK neck pain. Explained pathophysiology of neck strain.  Will order him NSAIDs and muscle relaxer.   Pt brings up a history of hernia repair that was done years ago, states he thinks it moved and is concerned.   Also concerned with a cyst found on CT a few years ago is causing his neck pain.   Reviewed the head CT done on July 4, 2015 with him and reviewed the follow up recommendations with him to be done as an outpatient with PCP.   A brief exam of the area he is concerned with in the left groin - there is no hernia, no bulge anywhere.   Will have   call him to arrange a follow up visit to establish care with clinic. Referral placed.     Discharge Medication List as of 2/22/2018  6:51 PM      START taking these medications    Details   cyclobenzaprine (FLEXERIL) 10 MG tablet Take 1 tablet (10 mg) by mouth 2 times daily as needed for muscle spasms, Disp-20 tablet, R-0, E-Prescribe      naproxen (NAPROSYN) 500 MG tablet Take 1 tablet (500 mg) by mouth 2 times daily (with meals), Disp-20 tablet, R-0, E-Prescribe             Final diagnoses:   Strain of neck muscle, initial encounter       2/22/2018   [facility]     Alayna Payan NP  02/22/18 [id number]

## 2018-02-23 NOTE — DISCHARGE INSTRUCTIONS
Understanding Cervical Strain    There are 7 bones (vertebrae) in the neck that are part of the spine. These are called the cervical spine. Cervical strain is a medical term for neck pain. The neck has several layers of muscles. These are connected with tendons to the cervical spine and other bones. Neck pain is often the result of injury to these muscles and tendons.  Causes of cervical strain  Different types of stress on the neck can damage muscles and tendons (soft tissues) and cause cervical strain. Cervical tissues can be damaged by:    The neck being forced past its normal range of motion, such as in a car accident or sports injury    Constant, low-level stress, such as from poor posture or a poorly set-up workspace  Symptoms of cervical strain  These may include:    Neck pain or stiffness    Pain in the shoulders or upper back    Muscle spasms    Headache, often starting at the base of the neck    Irritability, difficulty concentrating, or sleeplessness  Treatment for cervical strain  This problem often gets better on its own. Treatments aim to reduce pain and inflammation and increase the range of motion of the neck. Possible treatments include:    Over-the-counter or prescription pain medicine. These help relieve pain and inflammation.    Stretching exercises to decrease neck stiffness.    Massage to decrease neck stiffness.    Cold or heat pack. These help reduce pain and swelling.  Call 911  Call emergency services right away if you have any of these:    Face drooping or numbness    Numbness or weakness, especially in the arms or on one side    Slurred speech or difficulty speaking    Blurred vision   When to call your healthcare provider  Call your healthcare provider right away if you have any of these:    Fever of 100.4 F (38 C) or higher, or as directed    Pain or stiffness that gets worse    Symptoms that don t get better, or get worse    Numbness, tingling, weakness or shooting pains into the  arms or legs    New symptoms  Date Last Reviewed: 3/10/2016    3990-8837 [employer]. [address] [place] [address] [id number]. All rights reserved. This information is not intended as a substitute for professional medical care. Always follow your healthcare professional's instructions.          Head Tilt / Upper Trapezius Stretch (Flexibility)    1. Sit up straight in a chair with your head and neck in a neutral position, ears in line with shoulders. Hold the edge of your chair seat with your right hand. Tuck your chin in slightly.  2. Tilt your head to the left, while looking straight ahead.  3. Put your left hand on the right side of your head. Gently pull your head to the left. Hold for 30 to 60 seconds. Use gentle pressure to increase the stretch. Don t force your head into position.  4. Return your head and neck to the neutral position.  5. Repeat this exercise 2 times, or as instructed.  6. Switch sides and repeat 2 times, or as instructed.  Challenge yourself  Tuck one end of a towel under your left arm. Then bring the other end over your right shoulder. Pull the towel down on your right shoulder with both hands as you side-bend your head to the left. Repeat with the other side.   Date Last Reviewed: 3/10/2016    6402-4615 [employer]. [address] [place] [address]20. All rights reserved. This information is not intended as a substitute for professional medical care. Always follow your healthcare professional's instructions.

## 2018-03-01 ENCOUNTER — TELEPHONE (OUTPATIENT)
Dept: CASE MANAGEMENT | Facility: HOSPITAL | Age: 43
End: 2018-03-01

## 2018-03-01 NOTE — TELEPHONE ENCOUNTER
Call placed to patient to secure an appointment per referral from Zenia NP from Urgent Care.  He is willing to see any provider in the [facility] system and would like to see someone from [facility].  I did try to reach out to Tyler from [facility] but was unsuccessful.  I did provide this to Sophia so there will be someone to follow up after his appointment.

## 2018-03-09 ENCOUNTER — TELEPHONE (OUTPATIENT)
Dept: CASE MANAGEMENT | Facility: HOSPITAL | Age: 43
End: 2018-03-09

## 2018-03-09 NOTE — TELEPHONE ENCOUNTER
Checked to note if patient made his f/u appointment with Phoebe.  He per his report lost his phone so inevitably did not have an alarm.  He now has requested another appointment, even as early as today he is willing to come in.  Double checking with scheduling to identify a next option.

## 2018-03-12 ENCOUNTER — OFFICE VISIT (OUTPATIENT)
Dept: FAMILY MEDICINE | Facility: OTHER | Age: 43
End: 2018-03-12
Attending: NURSE PRACTITIONER
Payer: MEDICAID

## 2018-03-12 VITALS
DIASTOLIC BLOOD PRESSURE: 76 MMHG | WEIGHT: 150 LBS | HEART RATE: 123 BPM | SYSTOLIC BLOOD PRESSURE: 120 MMHG | HEIGHT: 69 IN | BODY MASS INDEX: 22.22 KG/M2 | OXYGEN SATURATION: 95 % | TEMPERATURE: 98.6 F | RESPIRATION RATE: 20 BRPM

## 2018-03-12 DIAGNOSIS — F43.21 ADJUSTMENT DISORDER WITH DEPRESSED MOOD: ICD-10-CM

## 2018-03-12 DIAGNOSIS — Z72.0 TOBACCO ABUSE: ICD-10-CM

## 2018-03-12 DIAGNOSIS — M54.2 NECK PAIN: Primary | ICD-10-CM

## 2018-03-12 DIAGNOSIS — Z71.6 TOBACCO ABUSE COUNSELING: ICD-10-CM

## 2018-03-12 LAB
ALBUMIN SERPL-MCNC: 4.2 G/DL (ref 3.4–5)
ALP SERPL-CCNC: 67 U/L (ref 40–150)
ALT SERPL W P-5'-P-CCNC: 24 U/L (ref 0–70)
ANION GAP SERPL CALCULATED.3IONS-SCNC: 7 MMOL/L (ref 3–14)
AST SERPL W P-5'-P-CCNC: 26 U/L (ref 0–45)
BILIRUB SERPL-MCNC: 0.8 MG/DL (ref 0.2–1.3)
BUN SERPL-MCNC: 15 MG/DL (ref 7–30)
CALCIUM SERPL-MCNC: 9.3 MG/DL (ref 8.5–10.1)
CHLORIDE SERPL-SCNC: 107 MMOL/L (ref 94–109)
CO2 SERPL-SCNC: 26 MMOL/L (ref 20–32)
CREAT SERPL-MCNC: 0.86 MG/DL (ref 0.66–1.25)
GFR SERPL CREATININE-BSD FRML MDRD: >90 ML/MIN/1.7M2
GLUCOSE SERPL-MCNC: 103 MG/DL (ref 70–99)
POTASSIUM SERPL-SCNC: 4.5 MMOL/L (ref 3.4–5.3)
PROT SERPL-MCNC: 7.9 G/DL (ref 6.8–8.8)
SODIUM SERPL-SCNC: 140 MMOL/L (ref 133–144)
TSH SERPL DL<=0.005 MIU/L-ACNC: 0.54 MU/L (ref 0.4–4)

## 2018-03-12 PROCEDURE — 99214 OFFICE O/P EST MOD 30 MIN: CPT | Performed by: NURSE PRACTITIONER

## 2018-03-12 PROCEDURE — 80053 COMPREHEN METABOLIC PANEL: CPT | Mod: ZL | Performed by: NURSE PRACTITIONER

## 2018-03-12 PROCEDURE — 84443 ASSAY THYROID STIM HORMONE: CPT | Mod: ZL | Performed by: NURSE PRACTITIONER

## 2018-03-12 PROCEDURE — G0463 HOSPITAL OUTPT CLINIC VISIT: HCPCS | Performed by: NURSE PRACTITIONER

## 2018-03-12 PROCEDURE — 36415 COLL VENOUS BLD VENIPUNCTURE: CPT | Mod: ZL | Performed by: NURSE PRACTITIONER

## 2018-03-12 ASSESSMENT — ANXIETY QUESTIONNAIRES
6. BECOMING EASILY ANNOYED OR IRRITABLE: SEVERAL DAYS
1. FEELING NERVOUS, ANXIOUS, OR ON EDGE: SEVERAL DAYS
GAD7 TOTAL SCORE: 4
IF YOU CHECKED OFF ANY PROBLEMS ON THIS QUESTIONNAIRE, HOW DIFFICULT HAVE THESE PROBLEMS MADE IT FOR YOU TO DO YOUR WORK, TAKE CARE OF THINGS AT HOME, OR GET ALONG WITH OTHER PEOPLE: SOMEWHAT DIFFICULT
7. FEELING AFRAID AS IF SOMETHING AWFUL MIGHT HAPPEN: NOT AT ALL
3. WORRYING TOO MUCH ABOUT DIFFERENT THINGS: NOT AT ALL
5. BEING SO RESTLESS THAT IT IS HARD TO SIT STILL: SEVERAL DAYS
4. TROUBLE RELAXING: SEVERAL DAYS
2. NOT BEING ABLE TO STOP OR CONTROL WORRYING: NOT AT ALL

## 2018-03-12 ASSESSMENT — PAIN SCALES - GENERAL: PAINLEVEL: EXTREME PAIN (8)

## 2018-03-12 NOTE — NURSING NOTE
"Chief Complaint   Patient presents with     Hospital F/U      2-22-18       Initial /76  Pulse 123  Temp 98.6  F (37  C) (Tympanic)  Resp 20  Ht 5' 9\" (1.753 m)  Wt 150 lb (68 kg)  SpO2 95%  BMI 22.15 kg/m2 Estimated body mass index is 22.15 kg/(m^2) as calculated from the following:    Height as of this encounter: 5' 9\" (1.753 m).    Weight as of this encounter: 150 lb (68 kg).  Medication Reconciliation: complete   Dalila Delgado      "

## 2018-03-12 NOTE — PROGRESS NOTES
"  SUBJECTIVE:   Jose Alberto Lu is a 42 year old male who presents to clinic today for the following health issues:    Bill usually doctors at the . He has not seen a regular provider in years and does not remember when. He was on Remeron in the past states he has a diagnosis of bipolar depression has seen providers at [facility]. Does not do any counseling at this time last seen in 2015.       Marko states hernia repaired 2010. Marko is worried the mesh is moving around. Will review chart for hernia repair and discuss at next visit.    ED/UC Followup:    Facility:  [facility]  Date of visit: 2-22-18  Reason for visit: Neck pain  Current Status: unchanged       Musculoskeletal problem/pain      Duration: one year    Description  Location: right side of neck and right shoulder/back - about a year ago Marko was lifting and felt something pull/stabbing into the right side of the neck, right upper back and right shoulder area. Now has pain right side of the neck and right upper back/shoulder blade area \"does move around some\"    Intensity:  severe    Accompanying signs and symptoms: radiation of pain to bicep, tingling and right side    History  Previous similar problem: yes   Previous evaluation:  CT 2015 [facility]    Precipitating or alleviating factors:  Trauma or overuse: YES  Aggravating factors include: twisting, lifting    Therapies tried and outcome: rest/inactivity, NSAID - naproxen and flexeril          Problem list and histories reviewed & adjusted, as indicated.  Additional history: as documented    There is no problem list on file for this patient.    History reviewed. No pertinent surgical history.    Social History   Substance Use Topics     Smoking status: Current Every Day Smoker     Packs/day: 0.50     Years: 20.00     Smokeless tobacco: Never Used     Alcohol use No     History reviewed. No pertinent family history.      Current Outpatient Prescriptions   Medication Sig Dispense Refill     cyclobenzaprine (FLEXERIL) " "10 MG tablet Take 1 tablet (10 mg) by mouth 2 times daily as needed for muscle spasms 20 tablet 0     naproxen (NAPROSYN) 500 MG tablet Take 1 tablet (500 mg) by mouth 2 times daily (with meals) 20 tablet 0     No Known Allergies    Reviewed and updated as needed this visit by clinical staff  Tobacco  Allergies  Meds  Med Hx  Surg Hx  Fam Hx  Soc Hx      Reviewed and updated as needed this visit by Provider         ROS:  CONSTITUTIONAL: NEGATIVE for fever, chills, change in weight  INTEGUMENTARY/SKIN: NEGATIVE for worrisome rashes, moles or lesions  RESP: NEGATIVE for significant cough or SOB  CV: NEGATIVE for chest pain, palpitations or peripheral edema  MUSCULOSKELETAL: right neck pain with decreased ROM   NEURO: radiates to the [employer] area on the right arm  PSYCHIATRIC: increased stress, HX anxiety and HX depression    OBJECTIVE:     /76  Pulse 123  Temp 98.6  F (37  C) (Tympanic)  Resp 20  Ht 5' 9\" (1.753 m)  Wt 150 lb (68 kg)  SpO2 95%  BMI 22.15 kg/m2  Body mass index is 22.15 kg/(m^2).   GENERAL: alert and no distress  NECK: no adenopathy, no asymmetry, masses, or scars and thyroid normal to palpation  RESP: lungs clear to auscultation - no rales, rhonchi or wheezes  CV: tachycardic,  S1 S2, no S3 or S4, no murmur, click or rub, no peripheral edema and peripheral pulses strong  ABDOMEN: tenderness LLQ down left side to upper left thigh, bowel sounds normal and no palpable or pulsatile masses  MS: no gross musculoskeletal defects noted, no edema  SKIN: no suspicious lesions or rashes  PSYCH: mentation appears normal and affect flat  LYMPH: normal ant/post cervical, supraclavicular nodes    Diagnostic Test Results:  Results for orders placed or performed in visit on 03/12/18 (from the past 24 hour(s))   TSH with free T4 reflex   Result Value Ref Range    TSH 0.54 0.40 - 4.00 mU/L   Comprehensive metabolic panel   Result Value Ref Range    Sodium 140 133 - 144 mmol/L    Potassium 4.5 3.4 - " 5.3 mmol/L    Chloride 107 94 - 109 mmol/L    Carbon Dioxide 26 20 - 32 mmol/L    Anion Gap 7 3 - 14 mmol/L    Glucose 103 (H) 70 - 99 mg/dL    Urea Nitrogen 15 7 - 30 mg/dL    Creatinine 0.86 0.66 - 1.25 mg/dL    GFR Estimate >90 >60 mL/min/1.7m2    GFR Estimate If Black >90 >60 mL/min/1.7m2    Calcium 9.3 8.5 - 10.1 mg/dL    Bilirubin Total 0.8 0.2 - 1.3 mg/dL    Albumin 4.2 3.4 - 5.0 g/dL    Protein Total 7.9 6.8 - 8.8 g/dL    Alkaline Phosphatase 67 40 - 150 U/L    ALT 24 0 - 70 U/L    AST 26 0 - 45 U/L       ASSESSMENT/PLAN:     1. Neck pain  With neck pain for about a year will order an MRI. Plan to review MRI in 2 weeks and develop a plan of care at that time. Possible more muscular and at this time will encouraged Marko to take naproxen 2 times a day and flexeril as needed. He is also encouraged to try ice/heat 3-4 times a day.   - MR Lumbar Spine w/o Contrast; Future    2. Adjustment disorder with depressed mood  Marko has been off his medications for awhile. He states he does not have a place to live at this time, but is working with [facility]. Would like to see him get involved with [facility] for assistance as needed. Marko is planning on stopping to see patient financial on his way out to get assistance with getting medical insurance.     Marko has been off his medications for possible Bipolar or schizophrenia for an unknown amount of time. Referral made to psychiatry for medication management.   - TSH with free T4 reflex  - Comprehensive metabolic panel  - MENTAL HEALTH REFERRAL  - Adult; Psychiatry and Medication Management; Psychiatry; Range: [facility] [phone]; We will contact you to schedule the appointment or please call with any questions    3. Tobacco abuse  Marko smokes 1/2 PPD and would like to stop smoking referral placed to [place] [facility]uitPlan.  - QUITPLAN  Referral; Future  - Tobacco Cessation - Order to Satisfy Health Maintenance    4. Tobacco abuse counseling  As above  - QUITPLAN  Referral;  Future  - Tobacco Cessation - Order to Satisfy Health Maintenance    Tobacco Cessation:   reports that he has been smoking.  He has a 10.00 pack-year smoking history. He has never used smokeless tobacco.  Tobacco Cessation Action Plan: Phone counseling: Place order for QuitPlan (Tobacco Cessation [facility] Referral [id number])      Bill agrees with plan of care. Plan for follow up in a couple of weeks.  See Patient Instructions    MARIO ALBERTO [facility]

## 2018-03-12 NOTE — MR AVS SNAPSHOT
After Visit Summary   3/12/2018    Jose Alberto Lu    MRN: [id number]           Patient Information     Date Of Birth          1975        Visit Information        Provider Department      3/12/2018 11:20 AM Olivia Mckeon APRN [facility]        Today's Diagnoses     Neck pain    -  1    Adjustment disorder with depressed mood          Care Instructions      Self-Care for Neck Pain    Principles to encourage healing of musculoskeletal neck pain:  1) Reduce Strain, triggers  2) Practice Relaxation  3) Good Posture  4) Progressive Stretching  5) Get a good night's sleep    Avoid muscle tensing habits and activities that put strain on the neck and shoulders.  Remind yourself regularly to determine if you are doing any tensing habits.  Use reminders methods such as stickers or timers.  If noticed, replace these negative habits with a positive habit of having your head up, chest up, chin in, and shoulders down and back.       Shoulder shrugging and tensing    Tensing your neck and jaw    Sleeping on your stomach    Neck strain from musical instrument    Forward head posture   Resting your head on your hand    Slouching while sitting in a chair    Carrying heavy purse/backpack on one shoulder    Turning head to one side for hours w/ computer    Rounding shoulders down and forward     Avoid events or activities that trigger the pain.    Use a pain diary to review daily activities that aggravate or trigger the pain and change your behavior.  Identify both risk factors and protective factors    Practice relaxation and deep breathing  Take a deep breath from your abdomen up and let it out slowly as you allow your neck and shoulders to relax.  This can calm you, reduces stress, and decreases muscle tensing.     Good Posture.  Keep your head up, chest up, and you chin in.  Keep your shoulders down and back.   This will help in reduce strain to the neck and shoulder muscles.   Closely monitor you head position over your shoulders to maintain balanced and relaxed head, neck, and shoulder muscles. Keep you chin in and do not hold your head in a forward position. In sitting, support your mid back with a rolled towel so that your the shoulders fall back, your neck is relaxed, and your head is up.      Progressive Stretching.  Stretch the neck and shoulder muscles and joints.    Turn your head to one side and point your chin to that shoulder. Use your opposite hand to gently pull down the head to stretch your neck. Gently stretch 6 times per day for 6 times each to gain range of motion.     Get a good night s sleep.   Avoid caffeinated beverages (coffee, tea, and soft drinks) later in the day.  Improve your sleep environment.  Reduce light and noise and lie on a comfortable mattress.  Reduce stimulating activities in the late evening including computer work and exercising.  Avoid sleeping on your stomach.     Apply heat or cold and massage tender muscles.   Heat or ice applications used up to four times per day can relax the muscles and reduce pain. For heat, microwave a wet towel for approximately 1 minute or until towel is warm and wrap around a hot-water bottle or heated gel pack and apply for 15 to 20 minutes. For cold, use ice wrapped in a thin cloth on the area until you first feel some numbness. Use what feels best. Heat is often used for more chronic pain conditions and cold for acute conditions. Massage tender points in the muscles    Use anti-inflammatory and pain-reducing medications.  Short-term and occasional use of over-the-counter ibuprofen, naproxen, acetaminophen, or aspirin (without caffeine) can reduce joint and muscle pain.  Be sure to check with your provider to be sure this would be safe.  Please note that extended daily use can sometimes cause rebound pain and an extend your symptoms.      Take naproxen 2 times a day  Use ice and/or heat 3-4 times a day  Light  stretching/mild neck movement throughout the day  You can use flexeril 1-2 times a day as needed for muscle spasms/pain          Follow-ups after your visit        Additional Services     MENTAL HEALTH REFERRAL  - Adult; Psychiatry and Medication Management; Psychiatry; Range: [facility] [phone]; We will contact you to schedule the appointment or please call with any questions       All scheduling is subject to the client's specific insurance plan & benefits, provider/location availability, and provider clinical specialities.  Please arrive 15 minutes early for your first appointment and bring your completed paperwork.    Please be aware that coverage of these services is subject to the terms and limitations of your health insurance plan.  Call member services at your health plan with any benefit or coverage questions.                            Follow-up notes from your care team     Return in about 2 weeks (around 3/26/2018).      Your next 10 appointments already scheduled     Mar 28, 2018 12:20 PM CDT   (Arrive by 12:05 PM)   Office Visit with MARIO ALBERTO Wolf CNP   [facility] ([facility] )    [address] [place] [address] [phone]           Bring a current list of meds and any records pertaining to this visit. For Physicals, please bring immunization records and any forms needing to be filled out. Please arrive 10 minutes early to complete paperwork.              Future tests that were ordered for you today     Open Future Orders        Priority Expected Expires Ordered    MR Lumbar Spine w/o Contrast Routine  3/12/2019 3/12/2018            Who to contact     If you have questions or need follow up information about today's clinic visit or your schedule please contact [facility] directly at [phone].  Normal or non-critical lab and imaging results will be communicated to you by MyChart, letter or phone within 4  "business days after the clinic has received the results. If you do not hear from us within 7 days, please contact the clinic through [employer] or phone. If you have a critical or abnormal lab result, we will notify you by phone as soon as possible.  Submit refill requests through [employer] or call your pharmacy and they will forward the refill request to us. Please allow 3 business days for your refill to be completed.          Additional Information About Your Visit        [employer] Information     [employer] lets you send messages to your doctor, view your test results, renew your prescriptions, schedule appointments and more. To sign up, go to [email or web address] . Click on \"Log in\" on the left side of the screen, which will take you to the Welcome page. Then click on \"Sign up Now\" on the right side of the page.     You will be asked to enter the access code listed below, as well as some personal information. Please follow the directions to create your username and password.     Your access code is: [email or web address]  Expires: 2018  7:51 PM     Your access code will  in 90 days. If you need help or a new code, please call your [facility] clinic or [phone].        Care EveryWhere ID     This is your Care EveryWhere ID. This could be used by other organizations to access your [facility] medical records  [id number]        Your Vitals Were     Pulse Temperature Respirations Height Pulse Oximetry BMI (Body Mass Index)    123 98.6  F (37  C) (Tympanic) 20 5' 9\" (1.753 m) 95% 22.15 kg/m2       Blood Pressure from Last 3 Encounters:   18 120/76   18 132/85   17 116/85    Weight from Last 3 Encounters:   18 150 lb (68 kg)   17 175 lb (79.4 kg)   16 179 lb 3.2 oz (81.3 kg)              We Performed the Following     Comprehensive metabolic panel     MENTAL HEALTH REFERRAL  - Adult; Psychiatry and Medication Management; Psychiatry; Range: [facility] [phone]; We " will contact you to schedule the appointment or please call with any questions     TSH with free T4 reflex        Primary Care Provider Fax #    Physician No Ref-Primary [phone]       No address on file        Equal Access to Services     ZABRINA WAN : Hadii aad ku hadmitaaleah Hameed, maxine littlejosseline, deya dunn, guido kat kirtchristi gonzáles mirella montoya. So [facility] [phone].    ATENCIÓN: Si habla español, tiene a hernández disposición servicios gratuitos de asistencia lingüística. Llame al [phone].    We comply with applicable federal civil rights laws and [place] laws. We do not discriminate on the basis of race, color, national origin, age, disability, sex, sexual orientation, or gender identity.            Thank you!     [facility] you [facility]  for your [facility]. Our goal is always to provide you with excellent care. Hearing back from our patients is one way we can continue to improve our services. Please take a few minutes to complete the written survey that you may receive in the mail after your visit with us. Thank you!             Your Updated Medication List - Protect others around you: Learn how to safely use, store and throw away your medicines at www.disposemymeds.org.          This list is accurate as of 3/12/18 12:16 PM.  Always use your most recent med list.                   Brand Name Dispense Instructions for use Diagnosis    cyclobenzaprine 10 MG tablet    FLEXERIL    20 tablet    Take 1 tablet (10 mg) by mouth 2 times daily as needed for muscle spasms        naproxen 500 MG tablet    NAPROSYN    20 tablet    Take 1 tablet (500 mg) by mouth 2 times daily (with meals)

## 2018-03-12 NOTE — PATIENT INSTRUCTIONS
Self-Care for Neck Pain    Principles to encourage healing of musculoskeletal neck pain:  1) Reduce Strain, triggers  2) Practice Relaxation  3) Good Posture  4) Progressive Stretching  5) Get a good night's sleep    Avoid muscle tensing habits and activities that put strain on the neck and shoulders.  Remind yourself regularly to determine if you are doing any tensing habits.  Use reminders methods such as stickers or timers.  If noticed, replace these negative habits with a positive habit of having your head up, chest up, chin in, and shoulders down and back.       Shoulder shrugging and tensing    Tensing your neck and jaw    Sleeping on your stomach    Neck strain from musical instrument    Forward head posture   Resting your head on your hand    Slouching while sitting in a chair    Carrying heavy purse/backpack on one shoulder    Turning head to one side for hours w/ computer    Rounding shoulders down and forward     Avoid events or activities that trigger the pain.    Use a pain diary to review daily activities that aggravate or trigger the pain and change your behavior.  Identify both risk factors and protective factors    Practice relaxation and deep breathing  Take a deep breath from your abdomen up and let it out slowly as you allow your neck and shoulders to relax.  This can calm you, reduces stress, and decreases muscle tensing.     Good Posture.  Keep your head up, chest up, and you chin in.  Keep your shoulders down and back.   This will help in reduce strain to the neck and shoulder muscles.  Closely monitor you head position over your shoulders to maintain balanced and relaxed head, neck, and shoulder muscles. Keep you chin in and do not hold your head in a forward position. In sitting, support your mid back with a rolled towel so that your the shoulders fall back, your neck is relaxed, and your head is up.      Progressive Stretching.  Stretch the neck and shoulder muscles and joints.    Turn  your head to one side and point your chin to that shoulder. Use your opposite hand to gently pull down the head to stretch your neck. Gently stretch 6 times per day for 6 times each to gain range of motion.     Get a good night s sleep.   Avoid caffeinated beverages (coffee, tea, and soft drinks) later in the day.  Improve your sleep environment.  Reduce light and noise and lie on a comfortable mattress.  Reduce stimulating activities in the late evening including computer work and exercising.  Avoid sleeping on your stomach.     Apply heat or cold and massage tender muscles.   Heat or ice applications used up to four times per day can relax the muscles and reduce pain. For heat, microwave a wet towel for approximately 1 minute or until towel is warm and wrap around a hot-water bottle or heated gel pack and apply for 15 to 20 minutes. For cold, use ice wrapped in a thin cloth on the area until you first feel some numbness. Use what feels best. Heat is often used for more chronic pain conditions and cold for acute conditions. Massage tender points in the muscles    Use anti-inflammatory and pain-reducing medications.  Short-term and occasional use of over-the-counter ibuprofen, naproxen, acetaminophen, or aspirin (without caffeine) can reduce joint and muscle pain.  Be sure to check with your provider to be sure this would be safe.  Please note that extended daily use can sometimes cause rebound pain and an extend your symptoms.      Take naproxen 2 times a day  Use ice and/or heat 3-4 times a day  Light stretching/mild neck movement throughout the day  You can use flexeril 1-2 times a day as needed for muscle spasms/pain    HOW TO QUIT SMOKING  Smoking is one of the hardest habits to break. About half of all those who have ever smoked have been able to quit, and most of those (about 70%) who still smoke want to quit. Here are some of the best ways to stop smoking.     KEEP TRYING:  It takes most smokers about 8  tries before they are finally able to fully quit. So, the more often you try and fail, the better your chance of quitting the next time! So, don't give up!    GO COLD TURKEY:  Most ex-smokers quit cold turkey. Trying to cut back gradually doesn't seem to work as well, perhaps because it continues the smoking habit. Also, it is possible to fool yourself by inhaling more while smoking fewer cigarettes. This results in the same amount of nicotine in your body!    GET SUPPORT:  Support programs can make an important difference, especially for the heavy smoker. These groups offer lectures, methods to change your behavior and peer support. Call the free national Quitline for more information. 800-QUIT-NOW (873-033-3752). Low-cost or free programs are offered by many hospitals, local chapters of the American Lung Association (514-911-5515) and the American Cancer Society (419-837-8554). Support at home is important too. Non-smokers can help by offering praise and encouragement. If the smoker fails to quit, encourage them to try again!    OVER-THE-COUNTER MEDICINES:  For those who can't quit on their own, Nicotine Replacement Therapy (NRT) may make quitting much easier. Certain aids such as the nicotine patch, gum and lozenge are available without a prescription. However, it is best to use these under the guidance of your doctor. The skin patch provides a steady supply of nicotine to the body. Nicotine gum and lozenge gives temporary bursts of low levels of nicotine. Both methods take the edge off the craving for cigarettes. WARNING: If you feel symptoms of nicotine overdose, such as nausea, vomiting, dizziness, weakness, or fast heartbeat, stop using these and see your doctor.    PRESCRIPTION MEDICINES:  After evaluating your smoking patterns and prior attempts at quitting, your doctor may offer a prescription medicine such as bupropion (Zyban, Wellbutrin), varenicline (Chantix, Champix), a niocotine inhaler or nasal  spray. Each has its unique advantage and side effects which your doctor can review with you.    HEALTH BENEFITS OF QUITTING:  The benefits of quitting start right away and keep improving the longer you go without smokin minutes: blood pressure and pulse return to normal  8 hours: oxygen levels return to normal  2 days: ability to smell and taste begins to improve as damaged nerves start to regrow  2-3 weeks: circulation and lung function improves  1-9 months: decreased cough, congestion and shortness of breath; less tired  1 year: risk of heart attack decreases by half  5 years: risk of lung cancer decreases by half; risk of stroke becomes the same as a non-smoker  For information about how to quit smoking, visit the following links:  National Cancer [place] ,   Clearing the Air, Quit Smoking Today   - an online booklet. http://www.smokefree.gov/pubs/clearing_the_air.pdf  Smokefree.gov http://smokefree.gov/  QuitNet http://www.quitnet.com/    8844-2222 [facility], [address] [place] [address]. All rights reserved. This information is not intended as a substitute for professional medical care. Always follow your healthcare professional's instructions.    The Benefits of Living Smoke Free  What do you want to gain from quitting? Check off some reasons to quit.  Health Benefits  ___ Reduce my risk of lung cancer, heart disease, chronic lung disease  ___ Have fewer wrinkles and softer skin  ___ Improve my sense of taste and smell  ___ For pregnant women reduce the risk of having a miscarriage, stillbirth, premature birth, or low-birth-weight baby  Personal Benefits  ___ Feel more in control of my life  ___ Have better-smelling hair, breath, clothes, home, and car  ___ Save time by not having to take smoke breaks, buy cigarettes, or hunt for a light  ___ Have whiter teeth  Family Benefits  ___ Reduce my children s respiratory tract infections  ___ Set a good example for my children  ___ Reduce  my family s cancer risk  Financial Benefits  ___ Save hundreds of dollars each year that would be spent on cigarettes  ___ Save money on medical bills  ___ Save on life, health, and car insurance premiums    Those Dollars Add Up!  Cigarettes are expensive, and getting more expensive all the time. Do you realize how much money you are spending on cigarettes per year? What is the average amount you spend on a pack of cigarettes? What is the average number of packs that you smoke per day? Using your answers to these questions, fill in this formula to help you find out:  ($ _____ per pack) ×  ( _____ number of packs per day) × (365 days) =  $ _____ yearly cost of smoking  Besides tobacco, there are other costs, including extra cleaning bills and replacement costs for clothing and furniture; medical expenses for smoking-related illnesses; and higher health, life, and car insurance premiums.    Cigars and Pipes Count Too!  Cigars and pipes are also dangerous. So are smokeless (chewing) tobacco and snuff. All of these products contain nicotine, a highly addictive substance that has harmful effects on your body. Quitting smoking means giving up all tobacco products.      6196-9397 [facility], [address] [place] [address], [place] [id number]. All rights reserved. This information is not intended as a substitute for professional medical care. Always follow your healthcare professional's instructions.

## 2018-03-13 ASSESSMENT — ANXIETY QUESTIONNAIRES: GAD7 TOTAL SCORE: 4

## 2018-03-13 ASSESSMENT — PATIENT HEALTH QUESTIONNAIRE - PHQ9: SUM OF ALL RESPONSES TO PHQ QUESTIONS 1-9: 7

## 2018-03-19 ENCOUNTER — TELEPHONE (OUTPATIENT)
Dept: BEHAVIORAL HEALTH | Facility: OTHER | Age: 43
End: 2018-03-19

## 2018-03-19 ENCOUNTER — TELEPHONE (OUTPATIENT)
Dept: MRI IMAGING | Facility: HOSPITAL | Age: 43
End: 2018-03-19

## 2018-03-21 ENCOUNTER — OFFICE VISIT (OUTPATIENT)
Dept: PSYCHIATRY | Facility: OTHER | Age: 43
End: 2018-03-21
Attending: NURSE PRACTITIONER
Payer: MEDICAID

## 2018-03-21 VITALS
TEMPERATURE: 97.1 F | SYSTOLIC BLOOD PRESSURE: 106 MMHG | HEIGHT: 69 IN | HEART RATE: 95 BPM | DIASTOLIC BLOOD PRESSURE: 76 MMHG | BODY MASS INDEX: 22.22 KG/M2 | WEIGHT: 150 LBS | OXYGEN SATURATION: 97 %

## 2018-03-21 DIAGNOSIS — F31.12 BIPOLAR 1 DISORDER WITH MODERATE MANIA (H): Primary | ICD-10-CM

## 2018-03-21 PROCEDURE — G0463 HOSPITAL OUTPT CLINIC VISIT: HCPCS

## 2018-03-21 PROCEDURE — 99213 OFFICE O/P EST LOW 20 MIN: CPT | Performed by: NURSE PRACTITIONER

## 2018-03-21 RX ORDER — ARIPIPRAZOLE 10 MG/1
10 TABLET ORAL DAILY
Qty: 30 TABLET | Refills: 0 | Status: SHIPPED | OUTPATIENT
Start: 2018-03-21 | End: 2018-07-12

## 2018-03-21 NOTE — PROGRESS NOTES
"PSYCHIATRY CLINIC PROGRESS NOTE   30 minute medication management, more than 50% of time spent counseling patient on medications, medication side effects, symptom history and management   SUBJECTIVE / INTERIM HISTORY                                                                       Marko presents as a bit disorganized and first went to the hospital  but we found him and brought him to clinic. He is a very pleasant man but a very poor historian.   He says he has had a number of inpatient stays (maybe 5) on the 5th floor, for depression. He has also had manic episodes, where he doesn't sleep for days, and forgets to eat and drink water.   Marko has been off his medications for two years now, is homeless and does yard work on an irregular basis. He just now got insurance and says he is wanting to get back on his medications now.  Marko is pleasant but a poor historian, doesn't know much about his history or anyone's hx in family.  He is unable to say much about his symptoms. He sleeps about 5-8 hours a night now, and has to get up in the morning to move, because wherever he is, he has to move to somewhere else.   He does know that Lamictal made him feel groggy, as did Depakote, he knows he tried Latuda, that it wasn't helpful, and that he has been on Seroquel and it activated his rayo. He does not think he has been on Abilify.  He does take his friend's medications on occasion, not sure which ones.   No AH/ VH  No SI/HI    SUBSTANCE USE- no issues \"smokes weed only a few times a day\" and does not like meth or ETOH.     SYMPTOMS- Anxiety    depressed mood, No SI.   MEDICAL ROS-No N/v/d/c   MEDICAL / SURGICAL HISTORY                pregnant [if applicable]--no   Medical Team:     PMD-     Therapist-       Patient Active Problem List   Diagnosis     Bipolar 1      Anxiety                           ALLERGY   Review of patient's allergies indicates  No Known Allergies    MEDICATIONS                               " "                                                                    Current Outpatient Prescriptions   Medication Sig       Current Outpatient Prescriptions:      cyclobenzaprine (FLEXERIL) 10 MG tablet, Take 1 tablet (10 mg) by mouth 2 times daily as needed for muscle spasms, Disp: 20 tablet, Rfl: 0     naproxen (NAPROSYN) 500 MG tablet, Take 1 tablet (500 mg) by mouth 2 times daily (with meals), Disp: 20 tablet, Rfl: 0                                               No current facility-administered medications for this visit.          VITALS    /76 (BP Location: Left arm, Cuff Size: Adult Regular)  Pulse 95  Temp 97.1  F (36.2  C) (Tympanic)  Ht 5' 9\" (1.753 m)  Wt 150 lb (68 kg)  SpO2 97%  BMI 22.15 kg/m2    PHQ9                        PHQ-9 SCORE date date date   Total Score - - -   Total Score 1 1          MENTAL STATUS EXAM                                                                                        Alert. Oriented to person, place, and date / time. Adequately groomed, a bit agitated, cooperative with good eye contact. No problems with speech  But with jittery psychomotor behavior. Mood was euthymic and affect congruent to speech content and full range. Thought process, including associations, was a little confused and thought content was devoid of suicidal and homicidal ideation and psychotic thought. No hallucinations. Insight was fair. Judgment was intact and adequate for safety. Fund of knowledge was intact. Pt demonstrates no obvious problems with attention, concentration, language, recent or remote memory although these were not formally tested.      ASSESSMENT                                                                                                       HISTORICAL:  Initial psych consult       CURRENT: This patient provides a history which supports the diagnoses Bipolar 1,  depressed mood and anxiety but no psychotic features.      TREATMENT RISK STATEMENT: The risks, " benefits, alternatives and potential adverse effects have been explained and are understood by the pt. The pt agrees to the treatment plan with the ability to do so. The pt knows to call the clinic for any problems or access emergency care if needed.    DIAGNOSES                  Bipolar 1 (mild)  F31.12       LABS      Office Visit on 03/12/2018   Component Date Value Ref Range Status     TSH 03/12/2018 0.54  0.40 - 4.00 mU/L Final     Sodium 03/12/2018 140  133 - 144 mmol/L Final     Potassium 03/12/2018 4.5  3.4 - 5.3 mmol/L Final     Chloride 03/12/2018 107  94 - 109 mmol/L Final     Carbon Dioxide 03/12/2018 26  20 - 32 mmol/L Final     Anion Gap 03/12/2018 7  3 - 14 mmol/L Final     Glucose 03/12/2018 103* 70 - 99 mg/dL Final     Urea Nitrogen 03/12/2018 15  7 - 30 mg/dL Final     Creatinine 03/12/2018 0.86  0.66 - 1.25 mg/dL Final     GFR Estimate 03/12/2018 >90  >60 mL/min/1.7m2 Final    Non  GFR Calc     GFR Estimate If Black 03/12/2018 >90  >60 mL/min/1.7m2 Final    African American GFR Calc     Calcium 03/12/2018 9.3  8.5 - 10.1 mg/dL Final     Bilirubin Total 03/12/2018 0.8  0.2 - 1.3 mg/dL Final     Albumin 03/12/2018 4.2  3.4 - 5.0 g/dL Final     Protein Total 03/12/2018 7.9  6.8 - 8.8 g/dL Final     Alkaline Phosphatase 03/12/2018 67  40 - 150 U/L Final     ALT 03/12/2018 24  0 - 70 U/L Final     AST 03/12/2018 26  0 - 45 U/L Final          PLAN                                                                                                                          1) MEDICATIONS: Abilify 10mg X 1 daily PO     2) THERAPY: No Change.      3) LABS: none ordered     4) PT MONITOR [call for probs]: Worsening symptoms, side effects from medications, SI/HI     5) REFERRALS [CD tx, medical, tests other]:      6)  RTC: 1 month

## 2018-03-21 NOTE — NURSING NOTE
"Chief Complaint   Patient presents with     Consult       Initial /76 (BP Location: Left arm, Cuff Size: Adult Regular)  Pulse 95  Temp 97.1  F (36.2  C) (Tympanic)  Ht 5' 9\" (1.753 m)  Wt 150 lb (68 kg)  SpO2 97%  BMI 22.15 kg/m2 Estimated body mass index is 22.15 kg/(m^2) as calculated from the following:    Height as of this encounter: 5' 9\" (1.753 m).    Weight as of this encounter: 150 lb (68 kg).  Medication Reconciliation: complete   Yuik Abraham LPN      "

## 2018-03-21 NOTE — MR AVS SNAPSHOT
"              After Visit Summary   3/21/2018    Jose Alberto Lu    MRN: [id number]           Patient Information     Date Of Birth          1975        Visit Information        Provider Department      3/21/2018 6:00 PM Neva Augustine APRN CNP [facility]        Today's Diagnoses     Bipolar 1 disorder with moderate rayo (H)    -  1       Follow-ups after your visit        Your next 10 appointments already scheduled     Mar 28, 2018 12:20 PM CDT   (Arrive by 12:05 PM)   Office Visit with MARIO ALBERTO Wolf CNP   [facility] [place] ([facility] [place] [facility] [place] )    [address]  [place] [address]   [phone]           Bring a current list of meds and any records pertaining to this visit. For Physicals, please bring immunization records and any forms needing to be filled out. Please arrive 10 minutes early to complete paperwork.              Who to contact     If you have questions or need follow up information about today's clinic visit or your schedule please contact [facility] directly at [phone].  Normal or non-critical lab and imaging results will be communicated to you by [employer], letter or phone within 4 business days after the clinic has received the results. If you do not hear from us within 7 days, please contact the clinic through [employer] or phone. If you have a critical or abnormal lab result, we will notify you by phone as soon as possible.  Submit refill requests through [employer] or call your pharmacy and they will forward the refill request to us. Please allow 3 business days for your refill to be completed.          Additional Information About Your Visit        MyChart Information     [employer] lets you send messages to your doctor, view your test results, renew your prescriptions, schedule appointments and more. To sign up, go to [email or web address] . Click on \"Log in\" on the left side of the screen, which will take you to " "the Welcome page. Then click on \"Sign up Now\" on the right side of the page.     You will be asked to enter the access code listed below, as well as some personal information. Please follow the directions to create your username and password.     Your access code is: [email or web address]  Expires: 2018  7:51 PM     Your access code will  in 90 days. If you need help or a new code, please call your [facility] clinic or [phone].        Care EveryWhere ID     This is your Care EveryWhere ID. This could be used by other organizations to access your [facility] medical records  [id number]        Your Vitals Were     Pulse Temperature Height Pulse Oximetry BMI (Body Mass Index)       95 97.1  F (36.2  C) (Tympanic) 5' 9\" (1.753 m) 97% 22.15 kg/m2        Blood Pressure from Last 3 Encounters:   18 106/76   18 120/76   18 132/85    Weight from Last 3 Encounters:   18 150 lb (68 kg)   18 150 lb (68 kg)   17 175 lb (79.4 kg)              Today, you had the following     No orders found for display         Today's Medication Changes          These changes are accurate as of 3/21/18  6:43 PM.  If you have any questions, ask your nurse or doctor.               Start taking these medicines.        Dose/Directions    ARIPiprazole 10 MG tablet   Commonly known as:  ABILIFY   Used for:  Bipolar 1 disorder with moderate rayo (H)   Started by:  Neva Augustine APRN CNP        Dose:  10 mg   Take 1 tablet (10 mg) by mouth daily   Quantity:  30 tablet   Refills:  0            Where to get your medicines      These medications were sent to [id number] - [place] - [address]  [address] [place] [address]     Phone:  [phone]     ARIPiprazole 10 MG tablet                Primary Care Provider Fax #    Physician No Ref-Primary [phone]       No address on file        Equal Access to Services     ZABRINA WAN AH: maxine Roy, " deya ericksonlaynekeke woodsabdias renettain hayaan adeeg kharash la'aan ah. So [facility] [phone].    ATENCIÓN: Si elena ervin, tiene a hernández disposición servicios gratuitos de asistencia lingüística. Maryam al [phone].    We comply with applicable federal civil rights laws and [place] laws. We do not discriminate on the basis of race, color, national origin, age, disability, sex, sexual orientation, or gender identity.            Thank you!     Thank you for choosing [facility]  [facility] your [facility]. Our goal is always to provide you with excellent care. Hearing back from our patients is one way we can continue to improve our services. Please take a few minutes to complete the written survey that you may receive in the mail after your visit with us. Thank you!             Your Updated Medication List - Protect others around you: Learn how to safely use, store and throw away your medicines at www.disposemymeds.org.          This list is accurate as of 3/21/18  6:43 PM.  Always use your most recent med list.                   Brand Name Dispense Instructions for use Diagnosis    ARIPiprazole 10 MG tablet    ABILIFY    30 tablet    Take 1 tablet (10 mg) by mouth daily    Bipolar 1 disorder with moderate rayo (H)       cyclobenzaprine 10 MG tablet    FLEXERIL    20 tablet    Take 1 tablet (10 mg) by mouth 2 times daily as needed for muscle spasms        naproxen 500 MG tablet    NAPROSYN    20 tablet    Take 1 tablet (500 mg) by mouth 2 times daily (with meals)

## 2018-03-26 RX ORDER — LAMOTRIGINE 25 MG/1
50 TABLET ORAL
COMMUNITY
Start: 2011-09-28 | End: 2018-03-28

## 2018-03-26 RX ORDER — LEVOTHYROXINE SODIUM 50 UG/1
50 TABLET ORAL
COMMUNITY
Start: 2011-09-28 | End: 2018-07-12

## 2018-03-26 RX ORDER — ACETAMINOPHEN 500 MG
TABLET ORAL
COMMUNITY
Start: 2009-07-16 | End: 2018-03-28

## 2018-03-26 RX ORDER — AMITRIPTYLINE HYDROCHLORIDE 75 MG/1
75 TABLET ORAL
COMMUNITY
Start: 2011-09-28 | End: 2018-03-28

## 2018-03-28 ENCOUNTER — OFFICE VISIT (OUTPATIENT)
Dept: FAMILY MEDICINE | Facility: OTHER | Age: 43
End: 2018-03-28
Attending: NURSE PRACTITIONER
Payer: MEDICAID

## 2018-03-28 VITALS
OXYGEN SATURATION: 98 % | WEIGHT: 155 LBS | HEART RATE: 105 BPM | BODY MASS INDEX: 22.96 KG/M2 | HEIGHT: 69 IN | SYSTOLIC BLOOD PRESSURE: 102 MMHG | RESPIRATION RATE: 16 BRPM | TEMPERATURE: 97.6 F | DIASTOLIC BLOOD PRESSURE: 70 MMHG

## 2018-03-28 DIAGNOSIS — M54.2 NECK PAIN: Primary | ICD-10-CM

## 2018-03-28 PROCEDURE — G0463 HOSPITAL OUTPT CLINIC VISIT: HCPCS

## 2018-03-28 PROCEDURE — 99213 OFFICE O/P EST LOW 20 MIN: CPT | Performed by: NURSE PRACTITIONER

## 2018-03-28 ASSESSMENT — ANXIETY QUESTIONNAIRES
GAD7 TOTAL SCORE: 6
7. FEELING AFRAID AS IF SOMETHING AWFUL MIGHT HAPPEN: NOT AT ALL
6. BECOMING EASILY ANNOYED OR IRRITABLE: SEVERAL DAYS
5. BEING SO RESTLESS THAT IT IS HARD TO SIT STILL: SEVERAL DAYS
4. TROUBLE RELAXING: SEVERAL DAYS
1. FEELING NERVOUS, ANXIOUS, OR ON EDGE: SEVERAL DAYS
IF YOU CHECKED OFF ANY PROBLEMS ON THIS QUESTIONNAIRE, HOW DIFFICULT HAVE THESE PROBLEMS MADE IT FOR YOU TO DO YOUR WORK, TAKE CARE OF THINGS AT HOME, OR GET ALONG WITH OTHER PEOPLE: SOMEWHAT DIFFICULT
2. NOT BEING ABLE TO STOP OR CONTROL WORRYING: SEVERAL DAYS
3. WORRYING TOO MUCH ABOUT DIFFERENT THINGS: SEVERAL DAYS

## 2018-03-28 ASSESSMENT — PAIN SCALES - GENERAL: PAINLEVEL: EXTREME PAIN (8)

## 2018-03-28 NOTE — PATIENT INSTRUCTIONS
Normal exam today.  Follow up with any concerns. No sign of hernia today. If you notice a bulge, pain, or any concerns please follow up.    Please call to reschedule MRI of neck

## 2018-03-28 NOTE — NURSING NOTE
"Chief Complaint   Patient presents with     Hernia       Initial /70  Pulse 105  Temp 97.6  F (36.4  C) (Tympanic)  Resp 16  Ht 5' 9\" (1.753 m)  Wt 155 lb (70.3 kg)  SpO2 98%  BMI 22.89 kg/m2 Estimated body mass index is 22.89 kg/(m^2) as calculated from the following:    Height as of this encounter: 5' 9\" (1.753 m).    Weight as of this encounter: 155 lb (70.3 kg).  Medication Reconciliation: complete   Dalila Delgado    "

## 2018-03-28 NOTE — MR AVS SNAPSHOT
After Visit Summary   3/28/2018    Jose Alberto Lu    MRN: [id number]           Patient Information     Date Of Birth          1975        Visit Information        Provider Department      3/28/2018 12:20 PM Olivia Mckeon APRN [facility]        Today's Diagnoses     Neck pain    -  1      Care Instructions    Normal exam today.  Follow up with any concerns. No sign of hernia today. If you notice a bulge, pain, or any concerns please follow up.    Please call to reschedule MRI of neck           Follow-ups after your visit        Follow-up notes from your care team     Return if symptoms worsen or fail to improve.      Your next 10 appointments already scheduled     Mar 29, 2018  3:15 PM CDT   (Arrive by 3:00 PM)   MR LUMBAR SPINE W/O CONTRAST with [address] [facility] ([facility] )    [address] [place] [address] [id number] [phone]           Take your medicines as usual, unless your doctor tells you not to. Bring a list of your current medicines to your exam (including vitamins, minerals and over-the-counter drugs). Also bring the results of similar scans you may have had.  Please remove any body piercings and hair extensions before you arrive.  Follow your doctor s orders. If you do not, we may have to postpone your exam.  You may or may not receive IV contrast for this exam pending the discretion of the Radiologist.  You do not need to do anything special to prepare.  The MRI machine uses a strong magnet. Please wear clothes without metal (snaps, zippers). A sweatsuit works well, or we may give you a hospital gown.   **IMPORTANT** THE INSTRUCTIONS BELOW ARE ONLY FOR THOSE PATIENTS WHO HAVE BEEN PRESCRIBED SEDATION OR GENERAL ANESTHESIA DURING THEIR MRI PROCEDURE:  IF YOUR DOCTOR PRESCRIBED ORAL SEDATION (take medicine to help you relax during your exam):   You must get the medicine from your doctor (oral medication) before you arrive. Bring the  medicine to the exam. Do not take it at home. You ll be told when to take it upon arriving for your exam.   Arrive one hour early. Bring someone who can take you home after the test. Your medicine will make you sleepy. After the exam, you may not drive, take a bus or take a taxi by yourself.  IF YOUR DOCTOR PRESCRIBED IV SEDATION:   Arrive one hour early. Bring someone who can take you home after the test. Your medicine will make you sleepy. After the exam, you may not drive, take a bus or take a taxi by yourself.   No eating 6 hours before your exam. You may have clear liquids up until 4 hours before your exam. (Clear liquids include water, clear tea, black coffee and fruit juice without pulp.)  IF YOUR DOCTOR PRESCRIBED ANESTHESIA (be asleep for your exam):   Arrive 1 1/2 hours early. Bring someone who can take you home after the test. You may not drive, take a bus or take a taxi by yourself.   No eating 8 hours before your exam. You may have clear liquids up until 4 hours before your exam. (Clear liquids include water, clear tea, black coffee and fruit juice without pulp.)   You will spend four to five hours in the recovery room.  Please call the Imaging Department at your exam site with any questions.              Who to contact     If you have questions or need follow up information about today's clinic visit or your schedule please contact [facility] directly at [phone].  Normal or non-critical lab and imaging results will be communicated to you by MyChart, letter or phone within 4 business days after the clinic has received the results. If you do not hear from us within 7 days, please contact the clinic through [email or web address] or phone. If you have a critical or abnormal lab result, we will notify you by phone as soon as possible.  Submit refill requests through [employer] or call your pharmacy and they will forward the refill request to us. Please allow 3 business days for your refill to be  "completed.          Additional Information About Your Visit        Endoartharmysportgroup Information     [employer] lets you send messages to your doctor, view your test results, renew your prescriptions, schedule appointments and more. To sign up, go to [email or web address] . Click on \"Log in\" on the left side of the screen, which will take you to the Welcome page. Then click on \"Sign up Now\" on the right side of the page.     You will be asked to enter the access code listed below, as well as some personal information. Please follow the directions to create your username and password.     Your access code is: [email or web address]  Expires: 2018  7:51 PM     Your access code will  in 90 days. If you need help or a new code, please call your [facility] clinic or [phone].        Care EveryWhere ID     This is your Care EveryWhere ID. This could be used by other organizations to access your [facility] medical records  [id number]        Your Vitals Were     Pulse Temperature Respirations Height Pulse Oximetry BMI (Body Mass Index)    105 97.6  F (36.4  C) (Tympanic) 16 5' 9\" (1.753 m) 98% 22.89 kg/m2       Blood Pressure from Last 3 Encounters:   18 102/70   18 106/76   18 120/76    Weight from Last 3 Encounters:   18 155 lb (70.3 kg)   18 150 lb (68 kg)   18 150 lb (68 kg)              Today, you had the following     No orders found for display         Today's Medication Changes          These changes are accurate as of 3/28/18 12:48 PM.  If you have any questions, ask your nurse or doctor.               Stop taking these medicines if you haven't already. Please contact your care team if you have questions.     cyclobenzaprine 10 MG tablet   Commonly known as:  FLEXERIL   Stopped by:  Olivia Mckeon APRN CNP           TYLENOL 500 MG tablet   Generic drug:  acetaminophen   Stopped by:  Olivia Mckeon APRN CNP                    Primary Care Provider Fax #    Physician No " Ref-Primary [phone]       No address on file        Equal Access to Services     ZABRINA SAVAGE : Hadii dori jaime jessica Hameed, wamarkda littleharmanha, deya dregee kirtregis, guido jacksonin hayaamame moralezchristi reavescarina sandsmary annmame montoya. So [facility] [phone].    ATENCIÓN: Si habla español, tiene a hernández disposición servicios gratuitos de asistencia lingüística. Llame al [phone].    We comply with applicable federal civil rights laws and [place] laws. We do not discriminate on the basis of race, color, national origin, age, disability, sex, sexual orientation, or gender identity.            Thank you!     Thank you for choosing [facility]  for your care. Our goal is always to provide you with excellent care. Hearing back from our patients is one way we can continue to improve our services. Please take a few minutes to complete the written survey that you may receive in the mail after your visit with us. Thank you!             Your Updated Medication List - Protect others around you: Learn how to safely use, store and throw away your medicines at www.disposemymeds.org.          This list is accurate as of 3/28/18 12:48 PM.  Always use your most recent med list.                   Brand Name Dispense Instructions for use Diagnosis    ARIPiprazole 10 MG tablet    ABILIFY    30 tablet    Take 1 tablet (10 mg) by mouth daily    Bipolar 1 disorder with moderate rayo (H)       levothyroxine 50 MCG tablet    SYNTHROID/LEVOTHROID     Take 50 mcg by mouth        naproxen 500 MG tablet    NAPROSYN    20 tablet    Take 1 tablet (500 mg) by mouth 2 times daily (with meals)

## 2018-03-29 ENCOUNTER — HOSPITAL ENCOUNTER (OUTPATIENT)
Dept: MRI IMAGING | Facility: HOSPITAL | Age: 43
Discharge: HOME | End: 2018-03-29
Attending: NURSE PRACTITIONER | Admitting: NURSE PRACTITIONER
Payer: MEDICAID

## 2018-03-29 DIAGNOSIS — M54.2 NECK PAIN: ICD-10-CM

## 2018-03-29 PROCEDURE — 72148 MRI LUMBAR SPINE W/O DYE: CPT | Mod: TC

## 2018-03-29 ASSESSMENT — ANXIETY QUESTIONNAIRES: GAD7 TOTAL SCORE: 6

## 2018-03-29 ASSESSMENT — PATIENT HEALTH QUESTIONNAIRE - PHQ9: SUM OF ALL RESPONSES TO PHQ QUESTIONS 1-9: 7

## 2018-07-12 ENCOUNTER — HOSPITAL ENCOUNTER (EMERGENCY)
Facility: HOSPITAL | Age: 43
Discharge: HOME | End: 2018-07-12
Attending: PHYSICIAN ASSISTANT | Admitting: PHYSICIAN ASSISTANT
Payer: COMMERCIAL

## 2018-07-12 VITALS
DIASTOLIC BLOOD PRESSURE: 99 MMHG | BODY MASS INDEX: 24.44 KG/M2 | RESPIRATION RATE: 18 BRPM | OXYGEN SATURATION: 97 % | SYSTOLIC BLOOD PRESSURE: 139 MMHG | HEART RATE: 68 BPM | WEIGHT: 165 LBS | HEIGHT: 69 IN | TEMPERATURE: 96.4 F

## 2018-07-12 DIAGNOSIS — F15.10 METHAMPHETAMINE ABUSE (H): ICD-10-CM

## 2018-07-12 DIAGNOSIS — M26.609 TMJ (TEMPOROMANDIBULAR JOINT SYNDROME): ICD-10-CM

## 2018-07-12 LAB
ALBUMIN SERPL-MCNC: 4 G/DL (ref 3.4–5)
ALBUMIN UR-MCNC: NEGATIVE MG/DL
ALP SERPL-CCNC: 62 U/L (ref 40–150)
ALT SERPL W P-5'-P-CCNC: 22 U/L (ref 0–70)
AMPHETAMINES UR QL SCN: POSITIVE
ANION GAP SERPL CALCULATED.3IONS-SCNC: 8 MMOL/L (ref 3–14)
APAP SERPL-MCNC: <2 MG/L (ref 10–20)
APPEARANCE UR: CLEAR
AST SERPL W P-5'-P-CCNC: 12 U/L (ref 0–45)
BACTERIA #/AREA URNS HPF: ABNORMAL /HPF
BARBITURATES UR QL: NEGATIVE
BASOPHILS # BLD AUTO: 0 10E9/L (ref 0–0.2)
BASOPHILS NFR BLD AUTO: 0.3 %
BENZODIAZ UR QL: NEGATIVE
BILIRUB SERPL-MCNC: 0.6 MG/DL (ref 0.2–1.3)
BILIRUB UR QL STRIP: NEGATIVE
BUN SERPL-MCNC: 21 MG/DL (ref 7–30)
CALCIUM SERPL-MCNC: 9 MG/DL (ref 8.5–10.1)
CANNABINOIDS UR QL SCN: POSITIVE
CHLORIDE SERPL-SCNC: 107 MMOL/L (ref 94–109)
CO2 SERPL-SCNC: 27 MMOL/L (ref 20–32)
COCAINE UR QL: NEGATIVE
COLOR UR AUTO: YELLOW
CREAT SERPL-MCNC: 1 MG/DL (ref 0.66–1.25)
DIFFERENTIAL METHOD BLD: NORMAL
EOSINOPHIL # BLD AUTO: 0.1 10E9/L (ref 0–0.7)
EOSINOPHIL NFR BLD AUTO: 0.9 %
ERYTHROCYTE [DISTWIDTH] IN BLOOD BY AUTOMATED COUNT: 12.9 % (ref 10–15)
ETHANOL SERPL-MCNC: <0.01 G/DL
GFR SERPL CREATININE-BSD FRML MDRD: 82 ML/MIN/1.7M2
GLUCOSE SERPL-MCNC: 112 MG/DL (ref 70–99)
GLUCOSE UR STRIP-MCNC: NEGATIVE MG/DL
HCT VFR BLD AUTO: 43.1 % (ref 40–53)
HGB BLD-MCNC: 14.7 G/DL (ref 13.3–17.7)
HGB UR QL STRIP: ABNORMAL
IMM GRANULOCYTES # BLD: 0 10E9/L (ref 0–0.4)
IMM GRANULOCYTES NFR BLD: 0.1 %
KETONES UR STRIP-MCNC: NEGATIVE MG/DL
LEUKOCYTE ESTERASE UR QL STRIP: NEGATIVE
LYMPHOCYTES # BLD AUTO: 2 10E9/L (ref 0.8–5.3)
LYMPHOCYTES NFR BLD AUTO: 29.8 %
MCH RBC QN AUTO: 31.5 PG (ref 26.5–33)
MCHC RBC AUTO-ENTMCNC: 34.1 G/DL (ref 31.5–36.5)
MCV RBC AUTO: 93 FL (ref 78–100)
METHADONE UR QL SCN: NEGATIVE
MONOCYTES # BLD AUTO: 0.3 10E9/L (ref 0–1.3)
MONOCYTES NFR BLD AUTO: 4.9 %
MUCOUS THREADS #/AREA URNS LPF: PRESENT /LPF
NEUTROPHILS # BLD AUTO: 4.3 10E9/L (ref 1.6–8.3)
NEUTROPHILS NFR BLD AUTO: 64 %
NITRATE UR QL: NEGATIVE
NRBC # BLD AUTO: 0 10*3/UL
NRBC BLD AUTO-RTO: 0 /100
OPIATES UR QL SCN: NEGATIVE
PCP UR QL SCN: NEGATIVE
PH UR STRIP: 6.5 PH (ref 4.7–8)
PLATELET # BLD AUTO: 266 10E9/L (ref 150–450)
POTASSIUM SERPL-SCNC: 4.2 MMOL/L (ref 3.4–5.3)
PROT SERPL-MCNC: 7.5 G/DL (ref 6.8–8.8)
RBC # BLD AUTO: 4.66 10E12/L (ref 4.4–5.9)
RBC #/AREA URNS AUTO: 9 /HPF (ref 0–2)
SALICYLATES SERPL-MCNC: <2 MG/DL
SODIUM SERPL-SCNC: 142 MMOL/L (ref 133–144)
SOURCE: ABNORMAL
SP GR UR STRIP: 1.02 (ref 1–1.03)
TSH SERPL DL<=0.005 MIU/L-ACNC: 0.74 MU/L (ref 0.4–4)
UROBILINOGEN UR STRIP-MCNC: NORMAL MG/DL (ref 0–2)
WBC # BLD AUTO: 6.7 10E9/L (ref 4–11)
WBC #/AREA URNS AUTO: 1 /HPF (ref 0–5)

## 2018-07-12 PROCEDURE — 80329 ANALGESICS NON-OPIOID 1 OR 2: CPT | Performed by: PHYSICIAN ASSISTANT

## 2018-07-12 PROCEDURE — 80053 COMPREHEN METABOLIC PANEL: CPT | Performed by: PHYSICIAN ASSISTANT

## 2018-07-12 PROCEDURE — 80307 DRUG TEST PRSMV CHEM ANLYZR: CPT | Performed by: PHYSICIAN ASSISTANT

## 2018-07-12 PROCEDURE — 84443 ASSAY THYROID STIM HORMONE: CPT | Performed by: PHYSICIAN ASSISTANT

## 2018-07-12 PROCEDURE — 80320 DRUG SCREEN QUANTALCOHOLS: CPT | Performed by: PHYSICIAN ASSISTANT

## 2018-07-12 PROCEDURE — 81001 URINALYSIS AUTO W/SCOPE: CPT | Performed by: PHYSICIAN ASSISTANT

## 2018-07-12 PROCEDURE — 99284 EMERGENCY DEPT VISIT MOD MDM: CPT | Performed by: PHYSICIAN ASSISTANT

## 2018-07-12 PROCEDURE — 85025 COMPLETE CBC W/AUTO DIFF WBC: CPT | Performed by: PHYSICIAN ASSISTANT

## 2018-07-12 PROCEDURE — 99285 EMERGENCY DEPT VISIT HI MDM: CPT

## 2018-07-12 PROCEDURE — 36415 COLL VENOUS BLD VENIPUNCTURE: CPT | Performed by: PHYSICIAN ASSISTANT

## 2018-07-12 NOTE — ED AVS SNAPSHOT
[facility]    [address] [place] [id number]    Phone:  [phone]                                       Jsoe Alberto Lu   MRN: [id number]    Department:  [facility]   Date of Visit:  7/12/2018           After Visit Summary Signature Page     I have received my discharge instructions, and my questions have been answered. I have discussed any challenges I see with this plan with the nurse or doctor.    ..........................................................................................................................................  Patient/Patient Representative Signature      ..........................................................................................................................................  Patient Representative Print Name and Relationship to Patient    ..................................................               ................................................  Date                                            Time    ..........................................................................................................................................  Reviewed by Signature/Title    ...................................................              ..............................................  Date                                                            Time

## 2018-07-12 NOTE — ED NOTES
Patient placed on a Health Officer Hold for suicidal ideation and previous attempts for SA with drug OD. Security outside the room for patient watch. Patient calm and cooperative at this time and changed into hospital scrubs cooperatively. Signed DEC consent for behavioral assessment.

## 2018-07-12 NOTE — ED NOTES
"[facility] spoke with patient and this writer, they will accept the patient tomorrow and have given the patient all the contact information. Detox is full and he cannot go there today, he would ideally be placed in detox and then go to [facility]. The patient continues to deny SI or thoughts of self harm since speaking with the provider and states \"I don't even know why I came here today\".  "

## 2018-07-12 NOTE — ED NOTES
"The patient is set up to be accepted at the [facility] tomorrow for his depression and has the phone number hand written on his AVS instructed to call them tomorrow to arrange transportation there. The patient was given a box meal to go on departure. 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 The patient was smiling on departure.  "

## 2018-07-12 NOTE — ED NOTES
"Meal tray ordered for the patient. The patient is irritable and stating he just \"wants to go\".  "

## 2018-07-12 NOTE — DISCHARGE INSTRUCTIONS
Please return here if you become suicidal. For your jaw- avoid eating tough foods like beef jerky and excessive chewing. Go to the [facility] tomorrow for help with your depression. No further meth use or you will not qualify to go there.        Pain Relief Methods for Temporomandibular Disorders (TMD)  You have been diagnosed with temporomandibular disorder (TMD). This term describes a group of problems related to the temporomandibular joint (TMJ) and nearby muscles. The TMJ is located where the upper and lower jaws meet. TMD can cause painful and frustrating symptoms. But your healthcare provider can recommend various pain relief methods as part of your treatment. These may include medicines and certain types of therapy, such as massage or gentle exercise.  Using medicines    Medicines may be prescribed to treat TMD. Others may be available over the counter. The medicine type and dosage will depend on the problem you have. For your safety, tell your healthcare provider if you are currently taking any medicines. Also mention any vitamins, herbs, or supplements you are using. Common medicines used to treat TMD include:    Anti-inflammatories and analgesics. These treat pain, inflammation, osteoarthritis, and rheumatoid arthritis. Anti-inflammatories reduce swelling, heat, redness, and pain. They also help restore function. Analgesics reduce pain. Nonsteroidal anti-inflammatories (NSAIDs) relieve inflammation as well as pain.    Muscle relaxants. These treat myofascial pain. This is pain that occurs in the soft tissues or muscles around the TMJ. Muscle relaxants help ease muscle tension. This reduces pressure on the TMJ from tight jaw muscles.    Antidepressants. These can be used to reduce pain or teeth grinding (bruxism). At higher dosages, these medicines are used to treat depression. Given at low dosages, antidepressants help relieve TMD symptoms. They can reduce muscle pain. They also raise the level of  serotonin, a body chemical that improves sleep. This in turn can decrease bruxism during the night.  Treating painful muscles  A trigger point is a painful spot in a tight muscle. It is often painful to the touch and may refer pain to other places. Your healthcare provider can focus on trigger points using:    Massage, both inside and outside the mouth. This relaxes muscles and improves circulation.    Palpation, which is applying pressure to points of the jaw and face with the fingers.    Cold spray and stretching of the muscles to relax them.    An anesthetic for pain relief. This may be given as an injection by your dentist.  Treating the joint  Therapy may focus directly on the TMJ. There are different ways to treat the joint:    A self-care program helps you treat and manage symptoms on your own. Your program may include exercises. It may also include using ice and heat to relieve pain.    Gentle manipulation reduces pain and restores range of motion. The healthcare provider uses his or her hands to relax muscles and ligaments around the joint.    Exercises strengthen muscles in the jaw and face.    Ultrasound uses sound waves to reduce pain and swelling. It also improves pain and swelling.  Treating inflammation  When the joint is inflamed, movement becomes difficult. It is even impossible at times. Your healthcare provider can help. Treatment may include:    Rest and gentle exercise. This is done to increase range of motion. One common exercise is to apply pressure to the jaw and resist the movement (isometric exercise).    A cold pack. This eases swelling and reduces pain. A cold pack may be applied for 10 to 20 minutes. Repeat 3 or 4 times a day. To make a cold pack, put ice cubes in a plastic bag that seals at the top. Wrap the bag in a clean, thin towel or cloth. Never put ice or a cold pack directly on the skin.    Massage and gentle manipulation.  As described above.     Date Last Reviewed: 8/1/2017     9140-2519 [employer]. [address] [place] [address], [place] [address]. All rights reserved. This information is not intended as a substitute for professional medical care. Always follow your healthcare professional's instructions.

## 2018-07-12 NOTE — ED NOTES
[facility] staff interviewed the patient and are requesting the labs and DEC assessment for possible placement. Fax [phone]. Denies thoughts of self harm or SI at this time after assessment by the provider. Cooperative, security on patient watch.

## 2018-07-12 NOTE — ED AVS SNAPSHOT
[facility]    [address]    [place] [address]    Phone:  [phone]                                       Jose Alberto Lu   MRN: [id number]    Department:  [facility]   Date of Visit:  7/12/2018           Patient Information     Date Of Birth          1975        Your diagnoses for this visit were:     Methamphetamine abuse     TMJ (temporomandibular joint syndrome)        You were seen by Becky Carmona PA-C.      Follow-up Information     Follow up with [facility] In 1 day.    Why:  If symptoms worsen        Discharge Instructions       Please return here if you become suicidal. For your jaw- avoid eating tough foods like beef jerky and excessive chewing. Go to the [facility] tomorrow for help with your depression. No further meth use or you will not qualify to go there.        Pain Relief Methods for Temporomandibular Disorders (TMD)  You have been diagnosed with temporomandibular disorder (TMD). This term describes a group of problems related to the temporomandibular joint (TMJ) and nearby muscles. The TMJ is located where the upper and lower jaws meet. TMD can cause painful and frustrating symptoms. But your healthcare provider can recommend various pain relief methods as part of your treatment. These may include medicines and certain types of therapy, such as massage or gentle exercise.  Using medicines    Medicines may be prescribed to treat TMD. Others may be available over the counter. The medicine type and dosage will depend on the problem you have. For your safety, tell your healthcare provider if you are currently taking any medicines. Also mention any vitamins, herbs, or supplements you are using. Common medicines used to treat TMD include:    Anti-inflammatories and analgesics. These treat pain, inflammation, osteoarthritis, and rheumatoid arthritis. Anti-inflammatories reduce swelling, heat, redness, and pain. They also help restore function.  Analgesics reduce pain. Nonsteroidal anti-inflammatories (NSAIDs) relieve inflammation as well as pain.    Muscle relaxants. These treat myofascial pain. This is pain that occurs in the soft tissues or muscles around the TMJ. Muscle relaxants help ease muscle tension. This reduces pressure on the TMJ from tight jaw muscles.    Antidepressants. These can be used to reduce pain or teeth grinding (bruxism). At higher dosages, these medicines are used to treat depression. Given at low dosages, antidepressants help relieve TMD symptoms. They can reduce muscle pain. They also raise the level of serotonin, a body chemical that improves sleep. This in turn can decrease bruxism during the night.  Treating painful muscles  A trigger point is a painful spot in a tight muscle. It is often painful to the touch and may refer pain to other places. Your healthcare provider can focus on trigger points using:    Massage, both inside and outside the mouth. This relaxes muscles and improves circulation.    Palpation, which is applying pressure to points of the jaw and face with the fingers.    Cold spray and stretching of the muscles to relax them.    An anesthetic for pain relief. This may be given as an injection by your dentist.  Treating the joint  Therapy may focus directly on the TMJ. There are different ways to treat the joint:    A self-care program helps you treat and manage symptoms on your own. Your program may include exercises. It may also include using ice and heat to relieve pain.    Gentle manipulation reduces pain and restores range of motion. The healthcare provider uses his or her hands to relax muscles and ligaments around the joint.    Exercises strengthen muscles in the jaw and face.    Ultrasound uses sound waves to reduce pain and swelling. It also improves pain and swelling.  Treating inflammation  When the joint is inflamed, movement becomes difficult. It is even impossible at times. Your healthcare provider  can help. Treatment may include:    Rest and gentle exercise. This is done to increase range of motion. One common exercise is to apply pressure to the jaw and resist the movement (isometric exercise).    A cold pack. This eases swelling and reduces pain. A cold pack may be applied for 10 to 20 minutes. Repeat 3 or 4 times a day. To make a cold pack, put ice cubes in a plastic bag that seals at the top. Wrap the bag in a clean, thin towel or cloth. Never put ice or a cold pack directly on the skin.    Massage and gentle manipulation.  As described above.     Date Last Reviewed: 8/1/2017 2000-2017 The [employer]. [address] [place] [address], [place] [address]. All rights reserved. This information is not intended as a substitute for professional medical care. Always follow your healthcare professional's instructions.                 Review of your medicines      Notice     You have not been prescribed any medications.            Procedures and tests performed during your visit     Acetaminophen level    Alcohol ethyl    CBC with platelets differential    Comprehensive metabolic panel    Drug Screen Urine (Range)    Salicylate level    TSH    UA reflex to Microscopic and Culture      Orders Needing Specimen Collection     None      Pending Results     No orders found from 7/10/2018 to 7/13/2018.            Pending Culture Results     No orders found from 7/10/2018 to 7/13/2018.            Thank you for choosing [facility]       Thank you for choosing [facility] for your care. Our goal is always to provide you with excellent care. Hearing back from our patients is one way we can continue to improve our services. Please take a few minutes to complete the written survey that you may receive in the mail after you visit with us. Thank you!        [employer] Information     [employer] lets you send messages to your doctor, view your test results, renew your prescriptions, schedule appointments and more. To sign up, go to  [email or web address] . Click on \"Log in\" on the left side of the screen, which will take you to the Welcome page. Then click on \"Sign up Now\" on the right side of the page.     You will be asked to enter the access code listed below, as well as some personal information. Please follow the directions to create your username and password.     Your access code is: [email or web address]  Expires: 10/10/2018  5:16 PM     Your access code will  in 90 days. If you need help or a new code, please call your [place] clinic or [phone].        Care EveryWhere ID     This is your Care EveryWhere ID. This could be used by other organizations to access your [place] medical records  [id number]        Equal Access to Services     ZABRINA WAN : Maryanne Hameed, maxine lopez, deya kaalgabino dunn, guido vu . So [facility] [phone].    ATENCIÓN: Si habla español, tiene a hernández disposición servicios gratuitos de asistencia lingüística. Maryam al [phone].    We comply with applicable federal civil rights laws and [place] laws. We do not discriminate on the basis of race, color, national origin, age, disability, sex, sexual orientation, or gender identity.            After Visit Summary       This is your record. Keep this with you and show to your community pharmacist(s) and doctor(s) at your next visit.                  "

## 2018-07-13 NOTE — ED NOTES
"Sonja from DEC called reporting her recommendation after DEC assessment, no recommendation for inpatient admission due to no insurance. Recommendation for us to contact [facility] for referral. She reported the patient was stating he was here for evaluation of his medical issues \"an infection with lock jaw\".  "

## 2018-07-13 NOTE — ED PROVIDER NOTES
"  History     Chief Complaint   Patient presents with     Suicidal     Constant thoughts, near daily X 3 weeks. Patient has though of overdose as means     HPI  Jose Alberto Lu is a 43 year old male who is brought in for evaluation of passive suicidal remarks he made to an officer today as he was being released from long term. He is on supervised probation until a trial coming up, charged with assault. He has h/o meth abuse, last used 2 days ago. He denies SI/HI to me. He c/o right sided jaw and ear pain today.     Problem List:    Patient Active Problem List    Diagnosis Date Noted     Polysubstance abuse 09/28/2011     Priority: Medium     MARY (generalized anxiety disorder) 07/16/2009     Priority: Medium     Overview:   IMO Update 10/11       GERD (gastroesophageal reflux disease) 07/16/2009     Priority: Medium     Hypothyroidism 07/16/2009     Priority: Medium     Schizoaffective disorder (H) 07/16/2009     Priority: Medium     Dysthymic disorder 05/13/2008     Priority: Medium        Past Medical History:    No past medical history on file.    Past Surgical History:    No past surgical history on file.    Family History:    No family history on file.    Social History:  Marital Status:  Single [1]  Social History   Substance Use Topics     Smoking status: Current Every Day Smoker     Packs/day: 0.50     Years: 20.00     Smokeless tobacco: Never Used     Alcohol use No        Medications:      No current outpatient prescriptions on file.      Review of Systems   All other systems reviewed and are negative.      Physical Exam   BP: 114/77  Pulse: 93  Temp: 98.2  F (36.8  C)  Resp: 12  Height: 175.3 cm (5' 9\")  Weight: 74.8 kg (165 lb)  SpO2: 97 %      Physical Exam   Constitutional: He is oriented to person, place, and time. He appears well-developed and well-nourished. No distress.   HENT:   Head: Normocephalic and atraumatic.   Right Ear: Hearing, tympanic membrane, external ear and ear canal normal.   Left Ear: " Hearing, tympanic membrane, external ear and ear canal normal.   Nose: Nose normal.   Mouth/Throat: Oropharynx is clear and moist. No oropharyngeal exudate.   Eyes: Conjunctivae and EOM are normal. Pupils are equal, round, and reactive to light. Right eye exhibits no discharge. Left eye exhibits no discharge. No scleral icterus.   Neck: Normal range of motion. Neck supple. No JVD present.   Cardiovascular: Normal rate, regular rhythm, normal heart sounds and intact distal pulses.  Exam reveals no gallop and no friction rub.    No murmur heard.  Pulmonary/Chest: Effort normal and breath sounds normal. No respiratory distress. He has no wheezes. He has no rales. He exhibits no tenderness.   Abdominal: There is no tenderness.   Musculoskeletal: Normal range of motion. He exhibits no edema.   Lymphadenopathy:     He has no cervical adenopathy.   Neurological: He is alert and oriented to person, place, and time. No cranial nerve deficit. Coordination normal.   Skin: Skin is warm and dry. No rash noted. He is not diaphoretic. No erythema. No pallor.   Psychiatric: Judgment and thought content normal. His mood appears anxious. He is hyperactive. Thought content is not paranoid and not delusional. He expresses no homicidal and no suicidal ideation. He expresses no suicidal plans and no homicidal plans.   Nursing note and vitals reviewed.      ED Course     ED Course     Procedures              Results for orders placed or performed during the hospital encounter of 07/12/18 (from the past 24 hour(s))   UA reflex to Microscopic and Culture   Result Value Ref Range    Color Urine Yellow     Appearance Urine Clear     Glucose Urine Negative NEG^Negative mg/dL    Bilirubin Urine Negative NEG^Negative    Ketones Urine Negative NEG^Negative mg/dL    Specific Gravity Urine 1.016 1.003 - 1.035    Blood Urine Trace (A) NEG^Negative    pH Urine 6.5 4.7 - 8.0 pH    Protein Albumin Urine Negative NEG^Negative mg/dL    Urobilinogen mg/dL  Normal 0.0 - 2.0 mg/dL    Nitrite Urine Negative NEG^Negative    Leukocyte Esterase Urine Negative NEG^Negative    Source Midstream Urine     RBC Urine 9 (H) 0 - 2 /HPF    WBC Urine 1 0 - 5 /HPF    Bacteria Urine None (A) NEG^Negative /HPF    Mucous Urine Present (A) NEG^Negative /LPF   Drug Screen Urine (Range)   Result Value Ref Range    Amphetamine Qual Urine Positive (A) NEG^Negative    Barbiturates Qual Urine Negative NEG^Negative    Benzodiazepine Qual Urine Negative NEG^Negative    Cannabinoids Qual Urine Positive (A) NEG^Negative    Cocaine Qual Urine Negative NEG^Negative    Opiates Qualitative Urine Negative NEG^Negative    Methadone Qual Urine Negative NEG^Negative    PCP Qual Urine Negative NEG^Negative   Acetaminophen level   Result Value Ref Range    Acetaminophen Level <2 mg/L   Alcohol ethyl   Result Value Ref Range    Ethanol g/dL <0.01 0.01 g/dL   CBC with platelets differential   Result Value Ref Range    WBC 6.7 4.0 - 11.0 10e9/L    RBC Count 4.66 4.4 - 5.9 10e12/L    Hemoglobin 14.7 13.3 - 17.7 g/dL    Hematocrit 43.1 40.0 - 53.0 %    MCV 93 78 - 100 fl    MCH 31.5 26.5 - 33.0 pg    MCHC 34.1 31.5 - 36.5 g/dL    RDW 12.9 10.0 - 15.0 %    Platelet Count 266 150 - 450 10e9/L    Diff Method Automated Method     % Neutrophils 64.0 %    % Lymphocytes 29.8 %    % Monocytes 4.9 %    % Eosinophils 0.9 %    % Basophils 0.3 %    % Immature Granulocytes 0.1 %    Nucleated RBCs 0 0 /100    Absolute Neutrophil 4.3 1.6 - 8.3 10e9/L    Absolute Lymphocytes 2.0 0.8 - 5.3 10e9/L    Absolute Monocytes 0.3 0.0 - 1.3 10e9/L    Absolute Eosinophils 0.1 0.0 - 0.7 10e9/L    Absolute Basophils 0.0 0.0 - 0.2 10e9/L    Abs Immature Granulocytes 0.0 0 - 0.4 10e9/L    Absolute Nucleated RBC 0.0    Comprehensive metabolic panel   Result Value Ref Range    Sodium 142 133 - 144 mmol/L    Potassium 4.2 3.4 - 5.3 mmol/L    Chloride 107 94 - 109 mmol/L    Carbon Dioxide 27 20 - 32 mmol/L    Anion Gap 8 3 - 14 mmol/L    Glucose  112 (H) 70 - 99 mg/dL    Urea Nitrogen 21 7 - 30 mg/dL    Creatinine 1.00 0.66 - 1.25 mg/dL    GFR Estimate 82 >60 mL/min/1.7m2    GFR Estimate If Black >90 >60 mL/min/1.7m2    Calcium 9.0 8.5 - 10.1 mg/dL    Bilirubin Total 0.6 0.2 - 1.3 mg/dL    Albumin 4.0 3.4 - 5.0 g/dL    Protein Total 7.5 6.8 - 8.8 g/dL    Alkaline Phosphatase 62 40 - 150 U/L    ALT 22 0 - 70 U/L    AST 12 0 - 45 U/L   TSH   Result Value Ref Range    TSH 0.74 0.40 - 4.00 mU/L   Salicylate level   Result Value Ref Range    Salicylate Level <2 mg/dL       Medications - No data to display    Assessments & Plan (with Medical Decision Making)   Jose Alberto denies SI/HI. DEC  agrees he does not need inpatient admission. His jaw pain seems due to TMJ dysfunction. Nothing acute. He was discharged home in good condition following.    Plan: Please return here if you become suicidal. For your jaw- avoid eating tough foods like beef jerky and excessive chewing. Go to the [facility] tomorrow for help with your depression. No further meth use or you will not qualify to go there.    I have reviewed the nursing notes.    I have reviewed the findings, diagnosis, plan and need for follow up with the patient.    There are no discharge medications for this patient.      Final diagnoses:   Methamphetamine abuse   TMJ (temporomandibular joint syndrome)       7/12/2018   [facility]     Becky Carmona PA-C  07/12/18 1604
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
## 2018-08-06 ENCOUNTER — OFFICE VISIT (OUTPATIENT)
Dept: FAMILY MEDICINE | Facility: OTHER | Age: 43
End: 2018-08-06
Attending: NURSE PRACTITIONER
Payer: COMMERCIAL

## 2018-08-06 VITALS
HEIGHT: 69 IN | SYSTOLIC BLOOD PRESSURE: 110 MMHG | TEMPERATURE: 97.6 F | HEART RATE: 74 BPM | OXYGEN SATURATION: 96 % | DIASTOLIC BLOOD PRESSURE: 82 MMHG | BODY MASS INDEX: 22.96 KG/M2 | RESPIRATION RATE: 16 BRPM | WEIGHT: 155 LBS

## 2018-08-06 DIAGNOSIS — Z71.6 TOBACCO ABUSE COUNSELING: ICD-10-CM

## 2018-08-06 DIAGNOSIS — M54.42 LEFT-SIDED LOW BACK PAIN WITH LEFT-SIDED SCIATICA, UNSPECIFIED CHRONICITY: Primary | ICD-10-CM

## 2018-08-06 DIAGNOSIS — Z72.0 TOBACCO ABUSE: ICD-10-CM

## 2018-08-06 PROCEDURE — 99213 OFFICE O/P EST LOW 20 MIN: CPT | Performed by: NURSE PRACTITIONER

## 2018-08-06 PROCEDURE — G0463 HOSPITAL OUTPT CLINIC VISIT: HCPCS

## 2018-08-06 RX ORDER — PREDNISONE 20 MG/1
40 TABLET ORAL DAILY
Qty: 10 TABLET | Refills: 0 | Status: SHIPPED | OUTPATIENT
Start: 2018-08-06 | End: 2018-08-11

## 2018-08-06 RX ORDER — ACETAMINOPHEN 500 MG
1000 TABLET ORAL EVERY 6 HOURS PRN
Qty: 100 TABLET | Refills: 0 | Status: SHIPPED | OUTPATIENT
Start: 2018-08-06 | End: 2020-01-13

## 2018-08-06 ASSESSMENT — PAIN SCALES - GENERAL: PAINLEVEL: SEVERE PAIN (7)

## 2018-08-06 ASSESSMENT — ANXIETY QUESTIONNAIRES
6. BECOMING EASILY ANNOYED OR IRRITABLE: SEVERAL DAYS
4. TROUBLE RELAXING: MORE THAN HALF THE DAYS
7. FEELING AFRAID AS IF SOMETHING AWFUL MIGHT HAPPEN: NOT AT ALL
1. FEELING NERVOUS, ANXIOUS, OR ON EDGE: NEARLY EVERY DAY
GAD7 TOTAL SCORE: 10
5. BEING SO RESTLESS THAT IT IS HARD TO SIT STILL: MORE THAN HALF THE DAYS
2. NOT BEING ABLE TO STOP OR CONTROL WORRYING: SEVERAL DAYS
IF YOU CHECKED OFF ANY PROBLEMS ON THIS QUESTIONNAIRE, HOW DIFFICULT HAVE THESE PROBLEMS MADE IT FOR YOU TO DO YOUR WORK, TAKE CARE OF THINGS AT HOME, OR GET ALONG WITH OTHER PEOPLE: SOMEWHAT DIFFICULT
3. WORRYING TOO MUCH ABOUT DIFFERENT THINGS: SEVERAL DAYS

## 2018-08-06 NOTE — NURSING NOTE
"Chief Complaint   Patient presents with     Results     MRI       Initial /82  Pulse 74  Temp 97.6  F (36.4  C) (Tympanic)  Resp 16  Ht 5' 9\" (1.753 m)  Wt 155 lb (70.3 kg)  SpO2 96%  BMI 22.89 kg/m2 Estimated body mass index is 22.89 kg/(m^2) as calculated from the following:    Height as of this encounter: 5' 9\" (1.753 m).    Weight as of this encounter: 155 lb (70.3 kg).  Medication Reconciliation: complete    Dalila Delgado LPN    "

## 2018-08-06 NOTE — MR AVS SNAPSHOT
After Visit Summary   8/6/2018    Jose Alberto Lu    MRN: [id number]           Patient Information     Date Of Birth          1975        Visit Information        Provider Department      8/6/2018 9:00 AM Olivia Mckeon APRN [facility]        Today's Diagnoses     Left-sided low back pain with left-sided sciatica, unspecified chronicity    -  1    Tobacco abuse        Tobacco abuse counseling          Care Instructions      HOW TO QUIT SMOKING  Smoking is one of the hardest habits to break. About half of all those who have ever smoked have been able to quit, and most of those (about 70%) who still smoke want to quit. Here are some of the best ways to stop smoking.     KEEP TRYING:  It takes most smokers about 8 tries before they are finally able to fully quit. So, the more often you try and fail, the better your chance of quitting the next time! So, don't give up!    GO COLD TURKEY:  Most ex-smokers quit cold turkey. Trying to cut back gradually doesn't seem to work as well, perhaps because it continues the smoking habit. Also, it is possible to fool yourself by inhaling more while smoking fewer cigarettes. This results in the same amount of nicotine in your body!    GET SUPPORT:  Support programs can make an important difference, especially for the heavy smoker. These groups offer lectures, methods to change your behavior and peer support. Call the free national Quitline for more information. 800-QUIT-NOW (821-356-4734). Low-cost or free programs are offered by many hospitals, local chapters of the American Lung Association (078-665-2209) and the American Cancer Society (408-804-1425). Support at home is important too. Non-smokers can help by offering praise and encouragement. If the smoker fails to quit, encourage them to try again!    OVER-THE-COUNTER MEDICINES:  For those who can't quit on their own, Nicotine Replacement Therapy (NRT) may make quitting much easier.  Certain aids such as the nicotine patch, gum and lozenge are available without a prescription. However, it is best to use these under the guidance of your doctor. The skin patch provides a steady supply of nicotine to the body. Nicotine gum and lozenge gives temporary bursts of low levels of nicotine. Both methods take the edge off the craving for cigarettes. WARNING: If you feel symptoms of nicotine overdose, such as nausea, vomiting, dizziness, weakness, or fast heartbeat, stop using these and see your doctor.    PRESCRIPTION MEDICINES:  After evaluating your smoking patterns and prior attempts at quitting, your doctor may offer a prescription medicine such as bupropion (Zyban, Wellbutrin), varenicline (Chantix, Champix), a niocotine inhaler or nasal spray. Each has its unique advantage and side effects which your doctor can review with you.    HEALTH BENEFITS OF QUITTING:  The benefits of quitting start right away and keep improving the longer you go without smokin minutes: blood pressure and pulse return to normal  8 hours: oxygen levels return to normal  2 days: ability to smell and taste begins to improve as damaged nerves start to regrow  2-3 weeks: circulation and lung function improves  1-9 months: decreased cough, congestion and shortness of breath; less tired  1 year: risk of heart attack decreases by half  5 years: risk of lung cancer decreases by half; risk of stroke becomes the same as a non-smoker  For information about how to quit smoking, visit the following links:  National [employer] [place] ,   Clearing the Air, Quit Smoking Today   - an online booklet. http://www.smokefree.gov/pubs/clearing_the_air.pdf  Smokefree.gov http://smokefree.gov/  QuitNet http://www.quitnet.com/    5872-9973 [employer], [address] [place] [address], [place] [address]. All rights reserved. This information is not intended as a substitute for professional medical care. Always follow your healthcare professional's  instructions.    The Benefits of Living Smoke Free  What do you want to gain from quitting? Check off some reasons to quit.  Health Benefits  ___ Reduce my risk of lung cancer, heart disease, chronic lung disease  ___ Have fewer wrinkles and softer skin  ___ Improve my sense of taste and smell  ___ For pregnant women--reduce the risk of having a miscarriage, stillbirth, premature birth, or low-birth-weight baby  Personal Benefits  ___ Feel more in control of my life  ___ Have better-smelling hair, breath, clothes, home, and car  ___ Save time by not having to take smoke breaks, buy cigarettes, or hunt for a light  ___ Have whiter teeth  Family Benefits  ___ Reduce my children s respiratory tract infections  ___ Set a good example for my children  ___ Reduce my family s cancer risk  Financial Benefits  ___ Save hundreds of dollars each year that would be spent on cigarettes  ___ Save money on medical bills  ___ Save on life, health, and car insurance premiums    Those Dollars Add Up!  Cigarettes are expensive, and getting more expensive all the time. Do you realize how much money you are spending on cigarettes per year? What is the average amount you spend on a pack of cigarettes? What is the average number of packs that you smoke per day? Using your answers to these questions, fill in this formula to help you find out:  ($ _____ per pack) ×  ( _____ number of packs per day) × (365 days) =  $ _____ yearly cost of smoking  Besides tobacco, there are other costs, including extra cleaning bills and replacement costs for clothing and furniture; medical expenses for smoking-related illnesses; and higher health, life, and car insurance premiums.    Cigars and Pipes Count Too!  Cigars and pipes are also dangerous. So are smokeless (chewing) tobacco and snuff. All of these products contain nicotine, a highly addictive substance that has harmful effects on your body. Quitting smoking means giving up all tobacco products.       8134-0290 [facility], [address] [place] [address], [place] [address]. All rights reserved. This information is not intended as a substitute for professional medical care. Always follow your healthcare professional's instructions.      Back Pain (Acute or Chronic)    Back pain is one of the most common problems. The good news is that most people feel better in 1 to 2 weeks, and most of the rest in 1 to 2 months. Most people can remain active.  People experience and describe pain differently; not everyone is the same.    The pain can be sharp, stabbing, shooting, aching, cramping or burning.    Movement, standing, bending, lifting, sitting, or walking may worsen pain.    It can be localized to one spot or area, or it can be more generalized.    It can spread or radiate upwards, to the front, or go down your arms or legs (sciatica).    It can cause muscle spasm.  Most of the time, mechanical problems with the muscles or spine cause the pain. Mechanical problems are usually caused by an injury to the muscles or ligaments. While illness can cause back pain, it is usually not caused by a serious illness. Mechanical problems include:     Physical activity such as sports, exercise, work, or normal activity    Overexertion, lifting, pushing, pulling incorrectly or too aggressively    Sudden twisting, bending, or stretching from an accident, or accidental movement    Poor posture    Stretching or moving wrong, without noticing pain at the time    Poor coordination, lack of regular exercise (check with your doctor about this)    Spinal disc disease or arthritis    Stress  Pain can also be related to pregnancy, or illness like appendicitis, bladder or kidney infections, pelvic infections, and many other things.  Acute back pain usually gets better in 1 to 2 weeks. Back pain related to disk disease, arthritis in the spinal joints or spinal stenosis (narrowing of the spinal canal) can become chronic and last for months or  years.  Unless you had a physical injury (for example, a car accident or fall) X-rays are usually not needed for the initial evaluation of back pain. If pain continues and does not respond to medical treatment, X-rays and other tests may be needed.  Home care  Try these home care recommendations:    When in bed, try to find a position of comfort. A firm mattress is best. Try lying flat on your back with pillows under your knees. You can also try lying on your side with your knees bent up towards your chest and a pillow between your knees.    At first, do not try to stretch out the sore spots. If there is a strain, it is not like the good soreness you get after exercising without an injury. In this case, stretching may make it worse.    Avoid prolong sitting, long car rides, or travel. This puts more stress on the lower back than standing or walking.    During the first 24 to 72 hours after an acute injury or flare up of chronic back pain, apply an ice pack to the painful area for 20 minutes and then remove it for 20 minutes. Do this over a period of 60 to 90 minutes or several times a day. This will reduce swelling and pain. Wrap the ice pack in a thin towel or plastic to protect your skin.    You can start with ice, then switch to heat. Heat (hot shower, hot bath, or heating pad) reduces pain and works well for muscle spasms. Heat can be applied to the painful area for 20 minutes then remove it for 20 minutes. Do this over a period of 60 to 90 minutes or several times a day. Do not sleep on a heating pad. It can lead to skin burns or tissue damage.    You can alternate ice and heat therapy. Talk with your doctor about the best treatment for your back pain.    Therapeutic massage can help relax the back muscles without stretching them.    Be aware of safe lifting methods and do not lift anything without stretching first.  Medicines  Talk to your doctor before using medicine, especially if you have other medical  problems or are taking other medicines.    You may use over-the-counter medicine as directed on the bottle to control pain, unless another pain medicine was prescribed. If you have chronic conditions like diabetes, liver or kidney disease, stomach ulcers, or gastrointestinal bleeding, or are taking blood thinners, talk to your doctor before taking any medicine.    Be careful if you are given a prescription medicines, narcotics, or medicine for muscle spasms. They can cause drowsiness, affect your coordination, reflexes, and judgement. Do not drive or operate heavy machinery.  Follow-up care  Follow up with your healthcare provider, or as advised.   A radiologist will review any X-rays that were taken. Your provide will notify you of any new findings that may affect your care.  Call 911  Call emergency services if any of the following occur:    Trouble breathing    Confusion    Very drowsy or trouble awakening    Fainting or loss of consciousness    Rapid or very slow heart rate    Loss of bowel or bladder control  When to seek medical advice  Call your healthcare provider right away if any of these occur:     Pain becomes worse or spreads to your legs    Weakness or numbness in one or both legs    Numbness in the groin or genital area  Date Last Reviewed: 7/1/2016 2000-2017 [employer]. [address] [place] [address]. All rights reserved. This information is not intended as a substitute for professional medical care. Always follow your healthcare professional's instructions.                Follow-ups after your visit        Additional Services     PHYSICAL THERAPY REFERRAL       *This therapy referral will be filtered to a centralized scheduling office at [facility] and the patient will receive a call to schedule an appointment at a [place] location most convenient for them. *     [facility] provides Physical Therapy evaluation and treatment  "and many specialty services across the [facility] system.  If requesting a specialty program, please choose from the list below.    If you have not heard from the scheduling office within 2 business days, please call [phone] for all locations, with the exception of [place], please call [phone] and [facility] [place], please call [phone]  Treatment: Evaluation & Treatment  Special Instructions/Modalities: chronic back pain  Special Programs: None    Please be aware that coverage of these services is subject to the terms and limitations of your health insurance plan.  Call member services at your health plan with any benefit or coverage questions.      **Note to Provider:  If you are referring outside of [facility] for the therapy appointment, please list the name of the location in the \"special instructions\" above, print the referral and give to the patient to schedule the appointment.            QUITPLAN  Referral       [place] TOBACCO QUITLINES FAX FORM  Fax form to: 1 [phone]    The clinic will facilitate the referral to the quitline.    Provider Information:  ===============================================================  MARIO ALBERTO Dillard CNP  ID#: [id number] - [facility] [place] [phone] Fax: [phone]   [email or web address]  Payor: JOSETTE / Plan: JOSETTE [place] / Product Type: HMO /   ===============================================================    The Public Health Service Guideline does not recommend providing over-the-counter nicotine replacement therapy products without physician authorization to patients with the following conditions: pregnancy, uncontrolled high blood pressure, or cardiovascular diseases.     I authorize the [place] Tobacco Quitlines to provide over-the-counter nicotine replacement products for the patient listed below if the patient's health plan benefits cover NRT or if the patient is eligible for QUITPLAN " services.    Patient Consented to:  ===============================================================  - YES - I am ready to quit tobacco and request the above information be given to the quitline so they may contact me.  I understand that one of [place]'s Tobacco Quitlines will inform my provider about my participation.  ===============================================================  Please check the BEST 3-hour call window for them to reach you: anytime  May we leave a message?  YES  Language Preference:  English  Phone Number: Home Phone      [phone]  Mobile          [phone]  Home Phone      [phone]     E-mail Address: No e-mail address on record    ========================================================================  FOR QUITLINE USE ONLY:  THIS INFORMATION WILL BE PROVIDED BACK TO THE PROVIDER  Contact date: __/ __/__ or ____ Did not reach after three attempts.    Outcome:  __ Enrolled in telephone counseling program  __ Declined  __ Not Reached    Stage of readiness: _______________________  Planned Quit Date: ___/ ___/ ___  Comments:      2011 [facility]   This message funded by [employer] [place], an independent licensee of the Blue Cross and Blue Shield Association. Rev. 11/1/12                  Follow-up notes from your care team     Return if symptoms worsen or fail to improve.      Future tests that were ordered for you today     Open Future Orders        Priority Expected Expires Ordered    QUITPLAN  Referral Routine  10/5/2018 8/6/2018            Who to contact     If you have questions or need follow up information about today's clinic visit or your schedule please contact [facility] directly at [phone].  Normal or non-critical lab and imaging results will be communicated to you by MyChart, letter or phone within 4 business days after the clinic has received the results. If you do not hear from us within 7 days, please contact  "the clinic through [employer] or phone. If you have a critical or abnormal lab result, we will notify you by phone as soon as possible.  Submit refill requests through [employer] or call your pharmacy and they will forward the refill request to us. Please allow 3 business days for your refill to be completed.          Additional Information About Your Visit        [employer] Information     [employer] lets you send messages to your doctor, view your test results, renew your prescriptions, schedule appointments and more. To sign up, go to [email or web address] . Click on \"Log in\" on the left side of the screen, which will take you to the Welcome page. Then click on \"Sign up Now\" on the right side of the page.     You will be asked to enter the access code listed below, as well as some personal information. Please follow the directions to create your username and password.     Your access code is: [email or web address]  Expires: 10/10/2018  5:16 PM     Your access code will  in 90 days. If you need help or a new code, please call your [facility] clinic or [phone].        Care EveryWhere ID     This is your Care EveryWhere ID. This could be used by other organizations to access your [facility] medical records  [id number]        Your Vitals Were     Pulse Temperature Respirations Height Pulse Oximetry BMI (Body Mass Index)    74 97.6  F (36.4  C) (Tympanic) 16 5' 9\" (1.753 m) 96% 22.89 kg/m2       Blood Pressure from Last 3 Encounters:   18 110/82   18 139/99   18 102/70    Weight from Last 3 Encounters:   18 155 lb (70.3 kg)   18 165 lb (74.8 kg)   18 155 lb (70.3 kg)              We Performed the Following     PHYSICAL THERAPY REFERRAL     Tobacco Cessation - Order to Satisfy Health Maintenance          Today's Medication Changes          These changes are accurate as of 18  9:52 AM.  If you have any questions, ask your nurse or doctor.               Start taking these medicines.        " Dose/Directions    acetaminophen 500 MG tablet   Commonly known as:  TYLENOL   Used for:  Left-sided low back pain with left-sided sciatica, unspecified chronicity   Started by:  Olivia Mckeon APRN CNP        Dose:  1000 mg   Take 2 tablets (1,000 mg) by mouth every 6 hours as needed for mild pain   Quantity:  100 tablet   Refills:  0       predniSONE 20 MG tablet   Commonly known as:  DELTASONE   Used for:  Left-sided low back pain with left-sided sciatica, unspecified chronicity   Started by:  Olivia Mckeon APRN CNP        Dose:  40 mg   Take 2 tablets (40 mg) by mouth daily for 5 days   Quantity:  10 tablet   Refills:  0            Where to get your medicines      These medications were sent to [id number] - [place], [place] - [address]  [address] [place] [address]     Phone:  [phone]     acetaminophen 500 MG tablet    predniSONE 20 MG tablet                Primary Care Provider Office Phone # Fax #    MARIO ALBERTO Wolf [phone]       [address]  [place] [address]        Equal Access to Services     [facility]: Hadii aad ku hadasho Soomaali, waaxda luqadaha, qaybta kaalmada adeegyada, guido vu . So [facility] [phone].    ATENCIÓN: Si habla español, tiene a hernández disposición servicios gratuitos de asistencia lingüística. [facility] [phone].    We comply with applicable federal civil rights laws and [place] laws. We do not discriminate on the basis of race, color, national origin, age, disability, sex, sexual orientation, or gender identity.            Thank you!     Thank you for choosing [facility]  for your care. Our goal is always to provide you with excellent care. Hearing back from our patients is one way we can continue to improve our services. Please take a few minutes to complete the written survey that you may receive in the mail after your visit with us. Thank you!              Your Updated Medication List - Protect others around you: Learn how to safely use, store and throw away your medicines at www.disposemymeds.org.          This list is accurate as of 8/6/18  9:52 AM.  Always use your most recent med list.                   Brand Name Dispense Instructions for use Diagnosis    acetaminophen 500 MG tablet    TYLENOL    100 tablet    Take 2 tablets (1,000 mg) by mouth every 6 hours as needed for mild pain    Left-sided low back pain with left-sided sciatica, unspecified chronicity       predniSONE 20 MG tablet    DELTASONE    10 tablet    Take 2 tablets (40 mg) by mouth daily for 5 days    Left-sided low back pain with left-sided sciatica, unspecified chronicity

## 2018-08-06 NOTE — PROGRESS NOTES
"  SUBJECTIVE:   Jose Alberto Lu is a 43 year old male who presents to clinic today for the following health issues:      MRI results from 3-      Duration: years    Description (location/character/radiation): low back    Intensity:  severe    Accompanying signs and symptoms: pain on left side of body    History (similar episodes/previous evaluation): yes previous spinal surgery    Precipitating or alleviating factors: None    Therapies tried and outcome: surgery, stretching           Problem list and histories reviewed & adjusted, as indicated.  Additional history: as documented    Patient Active Problem List   Diagnosis     Dysthymic disorder     MARY (generalized anxiety disorder)     GERD (gastroesophageal reflux disease)     Hypothyroidism     Polysubstance abuse     Schizoaffective disorder (H)     History reviewed. No pertinent surgical history.    Social History   Substance Use Topics     Smoking status: Current Every Day Smoker     Packs/day: 0.50     Years: 20.00     Types: Cigarettes     Start date: 1/1/1993     Smokeless tobacco: Never Used     Alcohol use No     History reviewed. No pertinent family history.      No current outpatient prescriptions on file.     No Known Allergies    Reviewed and updated as needed this visit by clinical staff  Tobacco       Reviewed and updated as needed this visit by Provider         ROS:  CONSTITUTIONAL: NEGATIVE for fever, chills, change in weight  INTEGUMENTARY/SKIN: NEGATIVE for worrisome rashes, moles or lesions  RESP: smokers cough  CV: NEGATIVE for chest pain, palpitations or peripheral edema  MUSCULOSKELETAL: mid low back pain and left sided  NEURO: radiates down left hip and leg    OBJECTIVE:     /82  Pulse 74  Temp 97.6  F (36.4  C) (Tympanic)  Resp 16  Ht 5' 9\" (1.753 m)  Wt 155 lb (70.3 kg)  SpO2 96%  BMI 22.89 kg/m2  Body mass index is 22.89 kg/(m^2).   GENERAL: alert and no distress  RESP: lungs clear to auscultation - no rales, " rhonchi or wheezes  CV: regular rate and rhythm, normal S1 S2, no S3 or S4, no murmur, click or rub, no peripheral edema and peripheral pulses strong  MS: lower back mid spine tenderness  SKIN: no suspicious lesions or rashes    Diagnostic Test Results:  PROCEDURE: MR LUMBAR SPINE W/O CONTRAST, 3/29/2018 3:34 PM      HISTORY:Male, age 42 years,  chronic history of low back pain and  bilateral lower extremity discomfort, worse on the right. ; Neck pain     COMPARISON: MRI lumbar spine 2/22/2008     TECHNIQUE: Sagittal T1, proton density, T2 with fat saturation; axial  proton density and T2.     FINDINGS:  Curvature: The lumbar spine demonstrates normal lordotic curvature.     Conus Medullaris: Normal in contour.      Intervertebral disks at T11-12 and T12-L1 are normal. There is slight  scoliotic curvature, convex right with the apex at L3.     L1-2: Normal disc. Minimal endplate and facet joint degeneration.      L2-3: Normal disc. Mild endplate and facet joint degeneration.      L3-4: Normal.     L4-5: Slight bulge. 6 mm moderate posterior central disc protrusion  with annular fissure is slightly larger. Mild degenerative changes are  seen about the endplates and facet joints.      L5-S1: Moderately severe disc degeneration is worsened with interval  enlargement of chronic bulge and endplate spurs. 6 mm posterior  central disc protrusion and broad-based left foraminal disc protrusion  contributing to lateral recess narrowing and slight impingement of the  traversing S1 root sleeves. Postoperative changes suggest left  laminotomy and partial facetectomy at this level.     SI Joints: Congruent. Mild degenerative changes.     Paraspinous musculature: Normal.     Retroperitoneal soft tissues: Normal.         IMPRESSION:   1.  Mild lateral recess narrowing at L5-S1 is slightly worse secondary  to interval enlargement of chronic bulge and a new 6 mm posterior  central disc protrusion. As result, there is  slight  impingement/abutment of the exiting S1 root sleeves.     2.  Postoperative changes consistent with left laminotomy/partial  facetectomy at the L5-S1 level.     3.   Interval enlargement of 6 mm posterior central disc protrusion at  L4-5 with annular fissuring may also contribute to history of back  pain.     ENOCH DEE MD    ASSESSMENT/PLAN:     1. Tobacco abuse  [facility]  Jose Alberto stated he was contacted once, but did not have any further follow thorough. He would like a referral again with [facility].   - Tobacco Cessation - Order to Satisfy Health Maintenance    2. Tobacco abuse counseling  As above    3. Left-sided low back pain with left-sided sciatica, unspecified chronicity  Chronic intermittent back pain. Decrease this morning. Discussed symptomatic treatment. He does not have any Bowel or Bladder problems at this time. Plan to try physical therapy for pain. He should continue with daily stretching.   - acetaminophen (TYLENOL) 500 MG tablet; Take 2 tablets (1,000 mg) by mouth every 6 hours as needed for mild pain  Dispense: 100 tablet; Refill: 0  - predniSONE (DELTASONE) 20 MG tablet; Take 2 tablets (40 mg) by mouth daily for 5 days  Dispense: 10 tablet; Refill: 0  - PHYSICAL THERAPY REFERRAL    Jose Alberto should follow up if no improvement or worsening symptoms.   Jose Alberto agrees with plan today.     See Patient Instructions    MARIO ALBERTO [facility]

## 2018-08-06 NOTE — LETTER
My Depression Action Plan  Name: Jose Alberto Lu   Date of Birth 1975  Date: 8/6/2018    My doctor: Olivia Mckeon   My clinic: [facility]  [address] [place] [address] [address]  [place] [address]  [phone]          GREEN    ZONE   Good Control    What it looks like:     Things are going generally well. You have normal up s and down s. You may even feel depressed from time to time, but bad moods usually last less than a day.   What you need to do:  1. Continue to care for yourself (see self care plan)  2. Check your depression survival kit and update it as needed  3. Follow your physician s recommendations including any medication.  4. Do not stop taking medication unless you consult with your physician first.           YELLOW         ZONE Getting Worse    What it looks like:     Depression is starting to interfere with your life.     It may be hard to get out of bed; you may be starting to isolate yourself from others.    Symptoms of depression are starting to last most all day and this has happened for several days.     You may have suicidal thoughts but they are not constant.   What you need to do:     1. Call your care team, your response to treatment will improve if you keep your care team informed of your progress. Yellow periods are signs an adjustment may need to be made.     2. Continue your self-care, even if you have to fake it!    3. Talk to someone in your support network    4. Open up your depression survival kit           RED    ZONE Medical Alert - Get Help    What it looks like:     Depression is seriously interfering with your life.     You may experience these or other symptoms: You can t get out of bed most days, can t work or engage in other necessary activities, you have trouble taking care of basic hygiene, or basic responsibilities, thoughts of suicide or death that will not go away, self-injurious behavior.     What you need to do:  1. Call your care  team and request a same-day appointment. If they are not available (weekends or after hours) call your local crisis line, emergency room or 911.            Depression Self Care Plan / Survival Kit    Self-Care for Depression  Here s the deal. Your body and mind are really not as separate as most people think.  What you do and think affects how you feel and how you feel influences what you do and think. This means if you do things that people who feel good do, it will help you feel better.  Sometimes this is all it takes.  There is also a place for medication and therapy depending on how severe your depression is, so be sure to consult with your medical provider and/ or Behavioral Health Consultant if your symptoms are worsening or not improving.     In order to better manage my stress, I will:    Exercise  Get some form of exercise, every day. This will help reduce pain and release endorphins, the  feel good  chemicals in your brain. This is almost as good as taking antidepressants!  This is not the same as joining a gym and then never going! (they count on that by the way ) It can be as simple as just going for a walk or doing some gardening, anything that will get you moving.      Hygiene   Maintain good hygiene (Get out of bed in the morning, Make your bed, Brush your teeth, Take a shower, and Get dressed like you were going to work, even if you are unemployed).  If your clothes don't fit try to get ones that do.    Diet  I will strive to eat foods that are good for me, drink plenty of water, and avoid excessive sugar, caffeine, alcohol, and other mood-altering substances.  Some foods that are helpful in depression are: complex carbohydrates, B vitamins, flaxseed, fish or fish oil, fresh fruits and vegetables.    Psychotherapy  I agree to participate in Individual Therapy (if recommended).    Medication  If prescribed medications, I agree to take them.  Missing doses can result in serious side effects.  I  understand that drinking alcohol, or other illicit drug use, may cause potential side effects.  I will not stop my medication abruptly without first discussing it with my provider.    Staying Connected With Others  I will stay in touch with my friends, family members, and my primary care provider/team.    Use your imagination  Be creative.  We all have a creative side; it doesn t matter if it s oil painting, sand castles, or mud pies! This will also kick up the endorphins.    Witness Beauty  (AKA stop and smell the roses) Take a look outside, even in mid-winter. Notice colors, textures. Watch the squirrels and birds.     Service to others  Be of service to others.  There is always someone else in need.  By helping others we can  get out of ourselves  and remember the really important things.  This also provides opportunities for practicing all the other parts of the program.    Humor  Laugh and be silly!  Adjust your TV habits for less news and crime-drama and more comedy.    Control your stress  Try breathing deep, massage therapy, biofeedback, and meditation. Find time to relax each day.     My support system    Clinic Contact:  Phone number:    Contact 1:  Phone number:    Contact 2:  Phone number:    Yazidism/:  Phone number:    Therapist:  Phone number:    Local crisis center:    Phone number:    Other community support:  Phone number:

## 2018-08-06 NOTE — PATIENT INSTRUCTIONS
HOW TO QUIT SMOKING  Smoking is one of the hardest habits to break. About half of all those who have ever smoked have been able to quit, and most of those (about 70%) who still smoke want to quit. Here are some of the best ways to stop smoking.     KEEP TRYING:  It takes most smokers about 8 tries before they are finally able to fully quit. So, the more often you try and fail, the better your chance of quitting the next time! So, don't give up!    GO COLD TURKEY:  Most ex-smokers quit cold turkey. Trying to cut back gradually doesn't seem to work as well, perhaps because it continues the smoking habit. Also, it is possible to fool yourself by inhaling more while smoking fewer cigarettes. This results in the same amount of nicotine in your body!    GET SUPPORT:  Support programs can make an important difference, especially for the heavy smoker. These groups offer lectures, methods to change your behavior and peer support. Call the free national Quitline for more information. 800-QUIT-NOW (281-856-8109). Low-cost or free programs are offered by many hospitals, local chapters of the American Lung Association (443-644-1326) and the American Cancer Society (915-633-0165). Support at home is important too. Non-smokers can help by offering praise and encouragement. If the smoker fails to quit, encourage them to try again!    OVER-THE-COUNTER MEDICINES:  For those who can't quit on their own, Nicotine Replacement Therapy (NRT) may make quitting much easier. Certain aids such as the nicotine patch, gum and lozenge are available without a prescription. However, it is best to use these under the guidance of your doctor. The skin patch provides a steady supply of nicotine to the body. Nicotine gum and lozenge gives temporary bursts of low levels of nicotine. Both methods take the edge off the craving for cigarettes. WARNING: If you feel symptoms of nicotine overdose, such as nausea, vomiting, dizziness, weakness, or fast  heartbeat, stop using these and see your doctor.    PRESCRIPTION MEDICINES:  After evaluating your smoking patterns and prior attempts at quitting, your doctor may offer a prescription medicine such as bupropion (Zyban, Wellbutrin), varenicline (Chantix, Champix), a niocotine inhaler or nasal spray. Each has its unique advantage and side effects which your doctor can review with you.    HEALTH BENEFITS OF QUITTING:  The benefits of quitting start right away and keep improving the longer you go without smokin minutes: blood pressure and pulse return to normal  8 hours: oxygen levels return to normal  2 days: ability to smell and taste begins to improve as damaged nerves start to regrow  2-3 weeks: circulation and lung function improves  1-9 months: decreased cough, congestion and shortness of breath; less tired  1 year: risk of heart attack decreases by half  5 years: risk of lung cancer decreases by half; risk of stroke becomes the same as a non-smoker  For information about how to quit smoking, visit the following links:  National Cancer [place] ,   Clearing the Air, Quit Smoking Today   - an online booklet. http://www.smokefree.gov/pubs/clearing_the_air.pdf  Smokefree.gov http://smokefree.gov/  QuitNet http://www.quitnet.com/    6572-2840 [employer], [address] [place] [address], [place] [address]. All rights reserved. This information is not intended as a substitute for professional medical care. Always follow your healthcare professional's instructions.    The Benefits of Living Smoke Free  What do you want to gain from quitting? Check off some reasons to quit.  Health Benefits  ___ Reduce my risk of lung cancer, heart disease, chronic lung disease  ___ Have fewer wrinkles and softer skin  ___ Improve my sense of taste and smell  ___ For pregnant women--reduce the risk of having a miscarriage, stillbirth, premature birth, or low-birth-weight baby  Personal Benefits  ___ Feel more in control of my  life  ___ Have better-smelling hair, breath, clothes, home, and car  ___ Save time by not having to take smoke breaks, buy cigarettes, or hunt for a light  ___ Have whiter teeth  Family Benefits  ___ Reduce my children s respiratory tract infections  ___ Set a good example for my children  ___ Reduce my family s cancer risk  Financial Benefits  ___ Save hundreds of dollars each year that would be spent on cigarettes  ___ Save money on medical bills  ___ Save on life, health, and car insurance premiums    Those Dollars Add Up!  Cigarettes are expensive, and getting more expensive all the time. Do you realize how much money you are spending on cigarettes per year? What is the average amount you spend on a pack of cigarettes? What is the average number of packs that you smoke per day? Using your answers to these questions, fill in this formula to help you find out:  ($ _____ per pack) ×  ( _____ number of packs per day) × (365 days) =  $ _____ yearly cost of smoking  Besides tobacco, there are other costs, including extra cleaning bills and replacement costs for clothing and furniture; medical expenses for smoking-related illnesses; and higher health, life, and car insurance premiums.    Cigars and Pipes Count Too!  Cigars and pipes are also dangerous. So are smokeless (chewing) tobacco and snuff. All of these products contain nicotine, a highly addictive substance that has harmful effects on your body. Quitting smoking means giving up all tobacco products.      4158-2462 [address], [place] [address]. All rights reserved. This information is not intended as a substitute for professional medical care. Always follow your healthcare professional's instructions.      Back Pain (Acute or Chronic)    Back pain is one of the most common problems. The good news is that most people feel better in 1 to 2 weeks, and most of the rest in 1 to 2 months. Most people can remain active.  People experience  and describe pain differently; not everyone is the same.    The pain can be sharp, stabbing, shooting, aching, cramping or burning.    Movement, standing, bending, lifting, sitting, or walking may worsen pain.    It can be localized to one spot or area, or it can be more generalized.    It can spread or radiate upwards, to the front, or go down your arms or legs (sciatica).    It can cause muscle spasm.  Most of the time, mechanical problems with the muscles or spine cause the pain. Mechanical problems are usually caused by an injury to the muscles or ligaments. While illness can cause back pain, it is usually not caused by a serious illness. Mechanical problems include:     Physical activity such as sports, exercise, work, or normal activity    Overexertion, lifting, pushing, pulling incorrectly or too aggressively    Sudden twisting, bending, or stretching from an accident, or accidental movement    Poor posture    Stretching or moving wrong, without noticing pain at the time    Poor coordination, lack of regular exercise (check with your doctor about this)    Spinal disc disease or arthritis    Stress  Pain can also be related to pregnancy, or illness like appendicitis, bladder or kidney infections, pelvic infections, and many other things.  Acute back pain usually gets better in 1 to 2 weeks. Back pain related to disk disease, arthritis in the spinal joints or spinal stenosis (narrowing of the spinal canal) can become chronic and last for months or years.  Unless you had a physical injury (for example, a car accident or fall) X-rays are usually not needed for the initial evaluation of back pain. If pain continues and does not respond to medical treatment, X-rays and other tests may be needed.  Home care  Try these home care recommendations:    When in bed, try to find a position of comfort. A firm mattress is best. Try lying flat on your back with pillows under your knees. You can also try lying on your side  with your knees bent up towards your chest and a pillow between your knees.    At first, do not try to stretch out the sore spots. If there is a strain, it is not like the good soreness you get after exercising without an injury. In this case, stretching may make it worse.    Avoid prolong sitting, long car rides, or travel. This puts more stress on the lower back than standing or walking.    During the first 24 to 72 hours after an acute injury or flare up of chronic back pain, apply an ice pack to the painful area for 20 minutes and then remove it for 20 minutes. Do this over a period of 60 to 90 minutes or several times a day. This will reduce swelling and pain. Wrap the ice pack in a thin towel or plastic to protect your skin.    You can start with ice, then switch to heat. Heat (hot shower, hot bath, or heating pad) reduces pain and works well for muscle spasms. Heat can be applied to the painful area for 20 minutes then remove it for 20 minutes. Do this over a period of 60 to 90 minutes or several times a day. Do not sleep on a heating pad. It can lead to skin burns or tissue damage.    You can alternate ice and heat therapy. Talk with your doctor about the best treatment for your back pain.    Therapeutic massage can help relax the back muscles without stretching them.    Be aware of safe lifting methods and do not lift anything without stretching first.  Medicines  Talk to your doctor before using medicine, especially if you have other medical problems or are taking other medicines.    You may use over-the-counter medicine as directed on the bottle to control pain, unless another pain medicine was prescribed. If you have chronic conditions like diabetes, liver or kidney disease, stomach ulcers, or gastrointestinal bleeding, or are taking blood thinners, talk to your doctor before taking any medicine.    Be careful if you are given a prescription medicines, narcotics, or medicine for muscle spasms. They can  cause drowsiness, affect your coordination, reflexes, and judgement. Do not drive or operate heavy machinery.  Follow-up care  Follow up with your healthcare provider, or as advised.   A radiologist will review any X-rays that were taken. Your provide will notify you of any new findings that may affect your care.  Call 911  Call emergency services if any of the following occur:    Trouble breathing    Confusion    Very drowsy or trouble awakening    Fainting or loss of consciousness    Rapid or very slow heart rate    Loss of bowel or bladder control  When to seek medical advice  Call your healthcare provider right away if any of these occur:     Pain becomes worse or spreads to your legs    Weakness or numbness in one or both legs    Numbness in the groin or genital area  Date Last Reviewed: 7/1/2016 2000-2017 [employer]. [address] [place] [address], [place] [address]. All rights reserved. This information is not intended as a substitute for professional medical care. Always follow your healthcare professional's instructions.

## 2018-08-07 ASSESSMENT — ANXIETY QUESTIONNAIRES: GAD7 TOTAL SCORE: 10

## 2018-08-07 ASSESSMENT — PATIENT HEALTH QUESTIONNAIRE - PHQ9: SUM OF ALL RESPONSES TO PHQ QUESTIONS 1-9: 12

## 2018-08-17 ENCOUNTER — HOSPITAL ENCOUNTER (OUTPATIENT)
Dept: PHYSICAL THERAPY | Facility: HOSPITAL | Age: 43
Setting detail: THERAPIES SERIES
End: 2018-08-17
Attending: NURSE PRACTITIONER
Payer: COMMERCIAL

## 2018-08-17 PROCEDURE — G8982 BODY POS GOAL STATUS: HCPCS | Mod: GP,CI

## 2018-08-17 PROCEDURE — 97161 PT EVAL LOW COMPLEX 20 MIN: CPT | Mod: GP

## 2018-08-17 PROCEDURE — G8981 BODY POS CURRENT STATUS: HCPCS | Mod: GP,CJ

## 2018-08-17 PROCEDURE — 97110 THERAPEUTIC EXERCISES: CPT | Mod: GP

## 2018-08-17 PROCEDURE — 40000718 ZZHC STATISTIC PT DEPARTMENT ORTHO VISIT
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
## 2018-08-24 ENCOUNTER — HOSPITAL ENCOUNTER (EMERGENCY)
Facility: HOSPITAL | Age: 43
Discharge: HOME | End: 2018-08-24
Attending: NURSE PRACTITIONER | Admitting: NURSE PRACTITIONER
Payer: COMMERCIAL

## 2018-08-24 VITALS
HEART RATE: 85 BPM | DIASTOLIC BLOOD PRESSURE: 78 MMHG | RESPIRATION RATE: 16 BRPM | TEMPERATURE: 98.1 F | OXYGEN SATURATION: 98 % | SYSTOLIC BLOOD PRESSURE: 113 MMHG

## 2018-08-24 DIAGNOSIS — M26.609 TMJ (TEMPOROMANDIBULAR JOINT SYNDROME): ICD-10-CM

## 2018-08-24 PROCEDURE — G0463 HOSPITAL OUTPT CLINIC VISIT: HCPCS | Mod: 25

## 2018-08-24 PROCEDURE — 99213 OFFICE O/P EST LOW 20 MIN: CPT | Performed by: NURSE PRACTITIONER

## 2018-08-24 PROCEDURE — 96372 THER/PROPH/DIAG INJ SC/IM: CPT

## 2018-08-24 PROCEDURE — 25000128 H RX IP 250 OP 636: Performed by: NURSE PRACTITIONER

## 2018-08-24 RX ORDER — TRAMADOL HYDROCHLORIDE 50 MG/1
50 TABLET ORAL EVERY 6 HOURS PRN
Qty: 10 TABLET | Refills: 0 | Status: SHIPPED | OUTPATIENT
Start: 2018-08-24 | End: 2019-03-20

## 2018-08-24 RX ORDER — KETOROLAC TROMETHAMINE 30 MG/ML
60 INJECTION, SOLUTION INTRAMUSCULAR; INTRAVENOUS ONCE
Status: COMPLETED | OUTPATIENT
Start: 2018-08-24 | End: 2018-08-24

## 2018-08-24 RX ADMIN — KETOROLAC TROMETHAMINE 60 MG: 30 INJECTION, SOLUTION INTRAMUSCULAR at 12:48

## 2018-08-24 ASSESSMENT — ENCOUNTER SYMPTOMS
CHILLS: 0
DIAPHORESIS: 1
FEVER: 0
FATIGUE: 0
APPETITE CHANGE: 1

## 2018-08-24 NOTE — ED AVS SNAPSHOT
[facility]    [address] [address]    Phone:  [phone]                                       Jose Alberto Lu   MRN: [id number]    Department:  [facility]   Date of Visit:  8/24/2018           Patient Information     Date Of Birth          1975        Your diagnoses for this visit were:     TMJ (temporomandibular joint syndrome)        You were seen by Nuvia Alas NP.      Follow-up Information     Follow up with [facility].    Specialty:  EMERGENCY MEDICINE    Why:  As needed, If symptoms worsen, or concerns develop    Contact information:    [address] [place] [id number] [phone]    Additional information:    From [facility]: Take [address]. Turn left at [address]. Turn left at the first stoplight on [address] [place] [address]. At the first stop sign, take a right onto [address]. Take a left into the parking lot and continue through until you reach the North enterance of the building.       From [place]: Take [address]. Take the [address] ramp towards [place]. Turn left onto [address]. Take a slight right onto [address]. Turn left at the first stoplight on [address]. At the first stop sign, take a right onto [address]. Take a left into the parking lot and continue through until you reach the North enterance of the building.       From [place]: Take [address]. Take a right at [address]. At the first stop sign, take a right onto [address]. Take a left into the parking lot and continue through until you reach the North enterance of the building.         Follow up with Olivia Mckeon APRN CNP.    Specialty:  Diabetes Education    Why:  As needed, if symptoms do not improve    Contact information:    [address]  [facility] [address]  [phone]          Discharge Instructions         TMJ Syndrome  The  temporomandibular joint (TMJ) is the joint that connects your lower jaw to your head. You can feel it in front of your ears when you open and close your mouth. TMJ disorders involve chronic or recurrent pain in the joint. When treated, symptoms of TMJ disorders usually go away within a few months.  Causes  There is no widely agreed-on cause of TMJ disorders. They have been linked to injury, arthritis, chronic fatigue syndrome, and fibromyalgia. A definite connection has not been shown, though.  Symptoms    Pain in the face, jaw, or neck    Pain with jaw movement or chewing    Locking or catching sensation of the jaw    Clicking, popping, or grinding sounds with movement of the TMJ    Headache    Ear pain  Home care  Modest, nonsurgical treatments are a good first step toward relieving symptoms. Try the approaches described below.    Rest the jaw by avoiding crunchy or hard-to-chew foods. Don t eat hard or sticky candies. Soft foods and liquids are easier on the jaw.    Protect your jaw while yawning. If you need to yawn, put your fist under your chin to prevent your mouth from opening up too wide.    To help relieve pain, try applying hot or cold packs to the painful area. Try both hot and cold to find out which works best for you. To make a cold pack, put ice cubes in a plastic bag that seals at the top. Wrap the bag in a clean, thin towel or cloth. Never put ice or an ice pack directly on the skin. If you use hot packs (small towels soaked in hot water), be careful not to burn yourself.    You may take acetaminophen or ibuprofen for pain, unless you were given a different pain medicine. (Note: If you have chronic liver or kidney disease or have ever had a stomach ulcer or gastrointestinal bleeding, talk with your healthcare provider before using these medicines. Also talk to your provider if you are taking medicine to prevent blood clots.) Don t give aspirin to a child younger than age 19 unless directed by the  child s provider. Taking aspirin can put a child at risk for Reye syndrome. This is a rare but very serious disorder that most often affects the brain and the liver.  Reducing stress  If stress seems to be contributing to your symptoms, try to identify the sources of stress in your life. These aren t always obvious. Common stressors include:    Everyday hassles. These include things such as traffic jams, missed appointments, or car trouble.    Major life changes. These can be good, such as a new baby or job promotion. And they can be bad, such as losing a job or losing a loved one.    Overload. The feeling that you have too many responsibilities and can't take care of everything at once.    Helplessness. Feeling like your problems are more than you can solve.  When possible, do something about your sources of stress. See if you can avoid hassles, limit the amount of change in your life at one time, and take breaks when you feel overloaded.  Unfortunately, many stressful situations cannot be avoided. So learning how to manage stress better is very important. Getting regular exercise, eating nutritious, balanced meals, and getting adequate rest all help to make everyday stress more manageable. Certain techniques are also helpful: relaxation and breathing exercises, visualization, biofeedback, meditation, or simply taking some time out to clear your mind. For more information, talk with your healthcare provider.  Follow-up care  Follow up with your healthcare provider, or as advised. Further testing and additional treatment may be required. If changes to your lifestyle do not improve your symptoms, talk with your healthcare provider about other available therapies. These include bite guards for help with teeth grinding, stress management techniques, and more. If stress is an important factor and does not respond to the above simple measures, talk with your healthcare provider about a referral for stress management.  If  X-rays were done, they will be reviewed by a specialist. You will be notified of the results, especially if they affect treatment.  Call 911  Call 911 if any of these occur:    Trouble breathing or swallowing, wheezing    Confusion    Extreme drowsiness or trouble awakening    Fainting or loss of consciousness    Rapid heart rate  When to seek medical advice  Call your healthcare provider right away if any of these occur:    Swollen or red face    Pain gets worse    Neck, mouth, tooth, or throat pain gets worse    Fever of 100.4 F (38 C) or higher, or as directed by your healthcare provider  Date Last Reviewed: 10/1/2017    6953-1019 [employer]. [address] [place] [address]. All rights reserved. This information is not intended as a substitute for professional medical care. Always follow your healthcare professional's instructions.          Your next 10 appointments already scheduled     Sep 06, 2018  1:30 PM CDT   Treatment with Diya Rodriguez, PT   [facility] ([facility] )    [address] [place] [id number]   [phone]              ED Discharge Orders     PHYSICAL THERAPY REFERRAL       *This therapy referral will be filtered to a centralized scheduling office at [facility] and the patient will receive a call to schedule an appointment at a [facility] location most convenient for them. *     [facility] provides Physical Therapy evaluation and treatment and many specialty services across the [facility] system.  If requesting a specialty program, please choose from the list below.    If you have not heard from the scheduling office within 2 business days, please call [phone] for all locations, with the exception of [place], please call [phone] and [facility], please call [phone]  Treatment: Evaluation & Treatment  Special Instructions/Modalities: per Therapist  Special Programs: TMJ    Please be aware  "that coverage of these services is subject to the terms and limitations of your health insurance plan.  Call member services at your health plan with any benefit or coverage questions.      **Note to Provider:  If you are referring outside of [place] for the therapy appointment, please list the name of the location in the \"special instructions\" above, print the referral and give to the patient to schedule the appointment.                     Review of your medicines      START taking        Dose / Directions Last dose taken    traMADol 50 MG tablet   Commonly known as:  ULTRAM   Dose:  50 mg   Quantity:  10 tablet        Take 1 tablet (50 mg) by mouth every 6 hours as needed for severe pain   Refills:  0          Our records show that you are taking the medicines listed below. If these are incorrect, please call your family doctor or clinic.        Dose / Directions Last dose taken    acetaminophen 500 MG tablet   Commonly known as:  TYLENOL   Dose:  1000 mg   Quantity:  100 tablet        Take 2 tablets (1,000 mg) by mouth every 6 hours as needed for mild pain   Refills:  0                Information about OPIOIDS     PRESCRIPTION OPIOIDS: WHAT YOU NEED TO KNOW   We gave you an opioid (narcotic) pain medicine. It is important to manage your pain, but opioids are not always the best choice. You should first try all the other options your care team gave you. Take this medicine for as short a time (and as few doses) as possible.    Some activities can increase your pain, such as bandage changes or therapy sessions. It may help to take your pain medicine 30 to 60 minutes before these activities. Reduce your stress by getting enough sleep, working on hobbies you enjoy and practicing relaxation or meditation. Talk to your care team about ways to manage your pain beyond prescription opioids.    These medicines have risks:    DO NOT drive when on new or higher doses of pain medicine. These medicines can affect your " alertness and reaction times, and you could be arrested for driving under the influence (DUI). If you need to use opioids long-term, talk to your care team about driving.    DO NOT operate heavy machinery    DO NOT do any other dangerous activities while taking these medicines.    DO NOT drink any alcohol while taking these medicines.     If the opioid prescribed includes acetaminophen, DO NOT take with any other medicines that contain acetaminophen. Read all labels carefully. Look for the word  acetaminophen  or  Tylenol.  Ask your pharmacist if you have questions or are unsure.    You can get addicted to pain medicines, especially if you have a history of addiction (chemical, alcohol or substance dependence). Talk to your care team about ways to reduce this risk.    All opioids tend to cause constipation. Drink plenty of water and eat foods that have a lot of fiber, such as fruits, vegetables, prune juice, apple juice and high-fiber cereal. Take a laxative (Miralax, milk of magnesia, Colace, Senna) if you don t move your bowels at least every other day. Other side effects include upset stomach, sleepiness, dizziness, throwing up, tolerance (needing more of the medicine to have the same effect), physical dependence and slowed breathing.    Store your pills in a secure place, locked if possible. We will not replace any lost or stolen medicine. If you don t finish your medicine, please throw away (dispose) as directed by your pharmacist. The [place] [employer] has more information about safe disposal: https://www.pca.[facility].[place]/living-green/managing-unwanted-medications        Prescriptions were sent or printed at these locations (1 Prescription)                   [id number] - [place] - [address]   [address] [place] [address]    Telephone:  [phone]   Fax:  [phone]   Hours:                  Printed at Department/Unit printer (1 of 1)         traMADol (ULTRAM)  "50 MG tablet                Orders Needing Specimen Collection     None      Pending Results     No orders found from 2018 to 2018.            Pending Culture Results     No orders found from 2018 to 2018.            Thank you for choosing [facility]       Thank you for choosing [facility] for your care. Our goal is always to provide you with excellent care. Hearing back from our patients is one way we can continue to improve our services. Please take a few minutes to complete the written survey that you may receive in the mail after you visit with us. Thank you!        [employer] Information     [employer] lets you send messages to your doctor, view your test results, renew your prescriptions, schedule appointments and more. To sign up, go to [email or web address] . Click on \"Log in\" on the left side of the screen, which will take you to the Welcome page. Then click on \"Sign up Now\" on the right side of the page.     You will be asked to enter the access code listed below, as well as some personal information. Please follow the directions to create your username and password.     Your access code is: [email or web address]  Expires: 10/10/2018  5:16 PM     Your access code will  in 90 days. If you need help or a new code, please call your [facility] clinic or [phone].        Care EveryWhere ID     This is your Care EveryWhere ID. This could be used by other organizations to access your [facility] medical records  [id number]        Equal Access to Services     ZABRINA WAN : Hadii dori arboledao Sojenniferali, waaxda luqadaha, qaybta kaalmada adechristiyada, guido montoya. So [facility] [phone].    ATENCIÓN: Si habla español, tiene a hernández disposición servicios gratuitos de asistencia lingüística. Llame al [phone].    We comply with applicable federal civil rights laws and [place] laws. We do not discriminate on the basis of race, color, national origin, age, disability, sex, sexual " orientation, or gender identity.            After Visit Summary       This is your record. Keep this with you and show to your community pharmacist(s) and doctor(s) at your next visit.

## 2018-08-24 NOTE — ED AVS SNAPSHOT
[facility]    [address] [place] [address]    Phone:  [phone]                                       Jose Alberto Lu   MRN: [id number]    Department:  [facility]   Date of Visit:  8/24/2018           After Visit Summary Signature Page     I have received my discharge instructions, and my questions have been answered. I have discussed any challenges I see with this plan with the nurse or doctor.    ..........................................................................................................................................  Patient/Patient Representative Signature      ..........................................................................................................................................  Patient Representative Print Name and Relationship to Patient    ..................................................               ................................................  Date                                            Time    ..........................................................................................................................................  Reviewed by Signature/Title    ...................................................              ..............................................  Date                                                            Time          22EPIC Rev 08/18

## 2018-08-24 NOTE — DISCHARGE INSTRUCTIONS
TMJ Syndrome  The temporomandibular joint (TMJ) is the joint that connects your lower jaw to your head. You can feel it in front of your ears when you open and close your mouth. TMJ disorders involve chronic or recurrent pain in the joint. When treated, symptoms of TMJ disorders usually go away within a few months.  Causes  There is no widely agreed-on cause of TMJ disorders. They have been linked to injury, arthritis, chronic fatigue syndrome, and fibromyalgia. A definite connection has not been shown, though.  Symptoms    Pain in the face, jaw, or neck    Pain with jaw movement or chewing    Locking or catching sensation of the jaw    Clicking, popping, or grinding sounds with movement of the TMJ    Headache    Ear pain  Home care  Modest, nonsurgical treatments are a good first step toward relieving symptoms. Try the approaches described below.    Rest the jaw by avoiding crunchy or hard-to-chew foods. Don t eat hard or sticky candies. Soft foods and liquids are easier on the jaw.    Protect your jaw while yawning. If you need to yawn, put your fist under your chin to prevent your mouth from opening up too wide.    To help relieve pain, try applying hot or cold packs to the painful area. Try both hot and cold to find out which works best for you. To make a cold pack, put ice cubes in a plastic bag that seals at the top. Wrap the bag in a clean, thin towel or cloth. Never put ice or an ice pack directly on the skin. If you use hot packs (small towels soaked in hot water), be careful not to burn yourself.    You may take acetaminophen or ibuprofen for pain, unless you were given a different pain medicine. (Note: If you have chronic liver or kidney disease or have ever had a stomach ulcer or gastrointestinal bleeding, talk with your healthcare provider before using these medicines. Also talk to your provider if you are taking medicine to prevent blood clots.) Don t give aspirin to a child younger than age 19  unless directed by the child s provider. Taking aspirin can put a child at risk for Reye syndrome. This is a rare but very serious disorder that most often affects the brain and the liver.  Reducing stress  If stress seems to be contributing to your symptoms, try to identify the sources of stress in your life. These aren t always obvious. Common stressors include:    Everyday hassles. These include things such as traffic jams, missed appointments, or car trouble.    Major life changes. These can be good, such as a new baby or job promotion. And they can be bad, such as losing a job or losing a loved one.    Overload. The feeling that you have too many responsibilities and can't take care of everything at once.    Helplessness. Feeling like your problems are more than you can solve.  When possible, do something about your sources of stress. See if you can avoid hassles, limit the amount of change in your life at one time, and take breaks when you feel overloaded.  Unfortunately, many stressful situations cannot be avoided. So learning how to manage stress better is very important. Getting regular exercise, eating nutritious, balanced meals, and getting adequate rest all help to make everyday stress more manageable. Certain techniques are also helpful: relaxation and breathing exercises, visualization, biofeedback, meditation, or simply taking some time out to clear your mind. For more information, talk with your healthcare provider.  Follow-up care  Follow up with your healthcare provider, or as advised. Further testing and additional treatment may be required. If changes to your lifestyle do not improve your symptoms, talk with your healthcare provider about other available therapies. These include bite guards for help with teeth grinding, stress management techniques, and more. If stress is an important factor and does not respond to the above simple measures, talk with your healthcare provider about a referral for  stress management.  If X-rays were done, they will be reviewed by a specialist. You will be notified of the results, especially if they affect treatment.  Call 911  Call 911 if any of these occur:    Trouble breathing or swallowing, wheezing    Confusion    Extreme drowsiness or trouble awakening    Fainting or loss of consciousness    Rapid heart rate  When to seek medical advice  Call your healthcare provider right away if any of these occur:    Swollen or red face    Pain gets worse    Neck, mouth, tooth, or throat pain gets worse    Fever of 100.4 F (38 C) or higher, or as directed by your healthcare provider  Date Last Reviewed: 10/1/2017    7878-6919 [employer]. [address] [place] [address], [place] [address]. All rights reserved. This information is not intended as a substitute for professional medical care. Always follow your healthcare professional's instructions.

## 2018-08-24 NOTE — ED TRIAGE NOTES
Pt presents today alone for c/o joint pain in his jaw on the right that is now radiating to the left, started about a month ago, pt feels it is an infection.

## 2018-08-24 NOTE — ED PROVIDER NOTES
"  History     Chief Complaint   Patient presents with     Temporomandibular Joint Pain     The history is provided by the patient. No  was used.     Jose Alberto Lu is a 43 year old male who presents today with a CC of right sided jaw pain x several weeks, he is now starting with left sided jaw pain that radiates to the ear.  No measured fevers but states he sweats all the time.  He has been taking tylenol for the pain with minimal improvement.  He has a left posterior \"really bad tooth\".  No history of frequent OM, no history of otological surgery or otorrhea.  He states his jaw \"locks up\" at times.  He states he has to pry it open and it \"pops\".  He has been under a great deal of stress.  He has been avoiding gum.  He is currently attending PT for low back pain.     Problem List:    Patient Active Problem List    Diagnosis Date Noted     Suicidal ideation 08/26/2018     Priority: Medium     Polysubstance abuse 09/28/2011     Priority: Medium     MARY (generalized anxiety disorder) 07/16/2009     Priority: Medium     Overview:   IMO Update 10/11       GERD (gastroesophageal reflux disease) 07/16/2009     Priority: Medium     Hypothyroidism 07/16/2009     Priority: Medium     Schizoaffective disorder (H) 07/16/2009     Priority: Medium     Dysthymic disorder 05/13/2008     Priority: Medium        Past Medical History:    History reviewed. No pertinent past medical history.    Past Surgical History:    History reviewed. No pertinent surgical history.    Family History:    No family history on file.    Social History:  Marital Status:  Single [1]  Social History   Substance Use Topics     Smoking status: Current Every Day Smoker     Packs/day: 0.50     Years: 20.00     Types: Cigarettes     Start date: 1/1/1993     Smokeless tobacco: Never Used     Alcohol use No        Medications:      No current outpatient prescriptions on file.      Review of Systems   Constitutional: Positive for appetite " change (pain with chewing) and diaphoresis. Negative for chills, fatigue and fever.   HENT: Positive for dental problem and ear pain.        Physical Exam   BP: 113/78  Pulse: 85  Temp: 98.1  F (36.7  C)  Resp: 16  SpO2: 98 %      Physical Exam   Constitutional: He is oriented to person, place, and time. He appears well-developed. He does not appear ill. He appears distressed (holding side of right cheek).   HENT:   Head: Normocephalic and atraumatic.   Right Ear: Tympanic membrane, external ear and ear canal normal.   Left Ear: Tympanic membrane, external ear and ear canal normal.   Able to open mouth about 75%  Left posterior tooth with crown, gingiva is pink, no edema. No tenderness to palpation in the area  Pain to palpation of bilateral TMJs and ramus of mandible   Cardiovascular: Normal rate.    Pulmonary/Chest: Effort normal.   Neurological: He is alert and oriented to person, place, and time.   Skin: Skin is warm and dry.   Nursing note and vitals reviewed.      ED Course     ED Course     Procedures    Medications   ketorolac (TORADOL) injection 60 mg (60 mg Intramuscular Given 8/24/18 1248)     Observed for a minimum of 20 minutes, tolerated medications well, no adverse efects noted, reports pain is mildly improved on discharge.    Assessments & Plan (with Medical Decision Making)     I have reviewed the nursing notes.    I have reviewed the findings, diagnosis, plan and need for follow up with the patient.  ASSESSMENT / PLAN:  (M26.609) TMJ (temporomandibular joint syndrome)  Comment: symptomatic, recurrent  Plan: PHYSICAL THERAPY REFERRAL   Tramadol as prescribed        TMJ disease can usually be managed conservatively.  Recommendations include resting the muscles and joints by eating soft foods, not chewing gum, avoiding clenching or tensing the jaw, and relaxing muscles with moist heat for 1/2 hour at least, twice daily.  Physical therapy can be helpful as well as non-steroidal anti-inflammatory drugs.   Oral splints or mouth guards are often necessary.  If these steps are not helpful an oral surgeon may need to be contacted.  I will refer you to PT as you are already attending there for your back      Discharge Medication List as of 8/24/2018  1:29 PM      START taking these medications    Details   traMADol (ULTRAM) 50 MG tablet Take 1 tablet (50 mg) by mouth every 6 hours as needed for severe pain, Disp-10 tablet, R-0, Local Print             Final diagnoses:   TMJ (temporomandibular joint syndrome)       8/24/2018   [facility]     Nuiva Alas NP  08/27/18 0905

## 2018-08-25 PROCEDURE — 99284 EMERGENCY DEPT VISIT MOD MDM: CPT | Mod: Z6 | Performed by: EMERGENCY MEDICINE

## 2018-08-25 PROCEDURE — 99285 EMERGENCY DEPT VISIT HI MDM: CPT

## 2018-08-26 ENCOUNTER — HOSPITAL ENCOUNTER (EMERGENCY)
Facility: HOSPITAL | Age: 43
Discharge: ANOTHER HOSPITAL | End: 2018-08-26
Attending: EMERGENCY MEDICINE | Admitting: EMERGENCY MEDICINE
Payer: COMMERCIAL

## 2018-08-26 ENCOUNTER — HOSPITAL ENCOUNTER (INPATIENT)
Facility: CLINIC | Age: 43
LOS: 1 days | Discharge: LEFT BEFORE DISCHARGE | DRG: 885 | End: 2018-08-27
Attending: PSYCHIATRY & NEUROLOGY | Admitting: PSYCHIATRY & NEUROLOGY
Payer: COMMERCIAL

## 2018-08-26 VITALS
OXYGEN SATURATION: 98 % | SYSTOLIC BLOOD PRESSURE: 121 MMHG | HEART RATE: 87 BPM | RESPIRATION RATE: 17 BRPM | DIASTOLIC BLOOD PRESSURE: 83 MMHG | TEMPERATURE: 98.3 F

## 2018-08-26 DIAGNOSIS — F12.10 CANNABIS ABUSE: ICD-10-CM

## 2018-08-26 DIAGNOSIS — R45.851 SUICIDAL IDEATION: Primary | ICD-10-CM

## 2018-08-26 LAB
ALBUMIN SERPL-MCNC: 4.2 G/DL (ref 3.4–5)
ALBUMIN UR-MCNC: NEGATIVE MG/DL
ALP SERPL-CCNC: 57 U/L (ref 40–150)
ALT SERPL W P-5'-P-CCNC: 24 U/L (ref 0–70)
AMPHETAMINES UR QL SCN: NEGATIVE
ANION GAP SERPL CALCULATED.3IONS-SCNC: 8 MMOL/L (ref 3–14)
APAP SERPL-MCNC: <2 MG/L (ref 10–20)
APPEARANCE UR: CLEAR
AST SERPL W P-5'-P-CCNC: 11 U/L (ref 0–45)
BARBITURATES UR QL: NEGATIVE
BASOPHILS # BLD AUTO: 0 10E9/L (ref 0–0.2)
BASOPHILS NFR BLD AUTO: 0.1 %
BENZODIAZ UR QL: NEGATIVE
BILIRUB SERPL-MCNC: 0.6 MG/DL (ref 0.2–1.3)
BILIRUB UR QL STRIP: NEGATIVE
BUN SERPL-MCNC: 22 MG/DL (ref 7–30)
CALCIUM SERPL-MCNC: 9.1 MG/DL (ref 8.5–10.1)
CANNABINOIDS UR QL SCN: POSITIVE
CHLORIDE SERPL-SCNC: 106 MMOL/L (ref 94–109)
CO2 SERPL-SCNC: 28 MMOL/L (ref 20–32)
COCAINE UR QL: NEGATIVE
COLOR UR AUTO: NORMAL
CREAT SERPL-MCNC: 1.22 MG/DL (ref 0.66–1.25)
DIFFERENTIAL METHOD BLD: NORMAL
EOSINOPHIL # BLD AUTO: 0.2 10E9/L (ref 0–0.7)
EOSINOPHIL NFR BLD AUTO: 1.8 %
ERYTHROCYTE [DISTWIDTH] IN BLOOD BY AUTOMATED COUNT: 12.7 % (ref 10–15)
ETHANOL SERPL-MCNC: <0.01 G/DL
GFR SERPL CREATININE-BSD FRML MDRD: 65 ML/MIN/1.7M2
GLUCOSE SERPL-MCNC: 108 MG/DL (ref 70–99)
GLUCOSE UR STRIP-MCNC: NEGATIVE MG/DL
HCT VFR BLD AUTO: 43.3 % (ref 40–53)
HGB BLD-MCNC: 15.3 G/DL (ref 13.3–17.7)
HGB UR QL STRIP: NEGATIVE
IMM GRANULOCYTES # BLD: 0 10E9/L (ref 0–0.4)
IMM GRANULOCYTES NFR BLD: 0.2 %
KETONES UR STRIP-MCNC: NEGATIVE MG/DL
LEUKOCYTE ESTERASE UR QL STRIP: NEGATIVE
LYMPHOCYTES # BLD AUTO: 3.3 10E9/L (ref 0.8–5.3)
LYMPHOCYTES NFR BLD AUTO: 35.8 %
MCH RBC QN AUTO: 32.5 PG (ref 26.5–33)
MCHC RBC AUTO-ENTMCNC: 35.3 G/DL (ref 31.5–36.5)
MCV RBC AUTO: 92 FL (ref 78–100)
METHADONE UR QL SCN: NEGATIVE
MONOCYTES # BLD AUTO: 0.7 10E9/L (ref 0–1.3)
MONOCYTES NFR BLD AUTO: 7.7 %
NEUTROPHILS # BLD AUTO: 5 10E9/L (ref 1.6–8.3)
NEUTROPHILS NFR BLD AUTO: 54.4 %
NITRATE UR QL: NEGATIVE
NRBC # BLD AUTO: 0 10*3/UL
NRBC BLD AUTO-RTO: 0 /100
OPIATES UR QL SCN: NEGATIVE
PCP UR QL SCN: NEGATIVE
PH UR STRIP: 6 PH (ref 4.7–8)
PLATELET # BLD AUTO: 272 10E9/L (ref 150–450)
POTASSIUM SERPL-SCNC: 4.2 MMOL/L (ref 3.4–5.3)
PROT SERPL-MCNC: 7.7 G/DL (ref 6.8–8.8)
RBC # BLD AUTO: 4.71 10E12/L (ref 4.4–5.9)
SALICYLATES SERPL-MCNC: <2 MG/DL
SODIUM SERPL-SCNC: 142 MMOL/L (ref 133–144)
SOURCE: NORMAL
SP GR UR STRIP: 1.01 (ref 1–1.03)
TSH SERPL DL<=0.005 MIU/L-ACNC: 1.38 MU/L (ref 0.4–4)
UROBILINOGEN UR STRIP-MCNC: NORMAL MG/DL (ref 0–2)
WBC # BLD AUTO: 9.3 10E9/L (ref 4–11)

## 2018-08-26 PROCEDURE — 12400007 ZZH R&B MH INTERMEDIATE UMMC

## 2018-08-26 PROCEDURE — 80329 ANALGESICS NON-OPIOID 1 OR 2: CPT | Performed by: EMERGENCY MEDICINE

## 2018-08-26 PROCEDURE — 25000132 ZZH RX MED GY IP 250 OP 250 PS 637: Performed by: PSYCHIATRY & NEUROLOGY

## 2018-08-26 PROCEDURE — 36415 COLL VENOUS BLD VENIPUNCTURE: CPT | Performed by: EMERGENCY MEDICINE

## 2018-08-26 PROCEDURE — 84443 ASSAY THYROID STIM HORMONE: CPT | Performed by: EMERGENCY MEDICINE

## 2018-08-26 PROCEDURE — 80307 DRUG TEST PRSMV CHEM ANLYZR: CPT | Performed by: EMERGENCY MEDICINE

## 2018-08-26 PROCEDURE — 80320 DRUG SCREEN QUANTALCOHOLS: CPT | Performed by: EMERGENCY MEDICINE

## 2018-08-26 PROCEDURE — 80053 COMPREHEN METABOLIC PANEL: CPT | Performed by: EMERGENCY MEDICINE

## 2018-08-26 PROCEDURE — 81003 URINALYSIS AUTO W/O SCOPE: CPT | Mod: 59 | Performed by: EMERGENCY MEDICINE

## 2018-08-26 PROCEDURE — 85025 COMPLETE CBC W/AUTO DIFF WBC: CPT | Performed by: EMERGENCY MEDICINE

## 2018-08-26 RX ORDER — OLANZAPINE 10 MG/1
10 TABLET ORAL
Status: DISCONTINUED | OUTPATIENT
Start: 2018-08-26 | End: 2018-08-27 | Stop reason: HOSPADM

## 2018-08-26 RX ORDER — MIRTAZAPINE 15 MG/1
15 TABLET, FILM COATED ORAL AT BEDTIME
Status: DISCONTINUED | OUTPATIENT
Start: 2018-08-26 | End: 2018-08-27 | Stop reason: HOSPADM

## 2018-08-26 RX ORDER — TRAZODONE HYDROCHLORIDE 50 MG/1
50 TABLET, FILM COATED ORAL
Status: DISCONTINUED | OUTPATIENT
Start: 2018-08-26 | End: 2018-08-27 | Stop reason: HOSPADM

## 2018-08-26 RX ORDER — HYDROXYZINE HYDROCHLORIDE 25 MG/1
25 TABLET, FILM COATED ORAL EVERY 4 HOURS PRN
Status: DISCONTINUED | OUTPATIENT
Start: 2018-08-26 | End: 2018-08-27 | Stop reason: HOSPADM

## 2018-08-26 RX ORDER — PRAZOSIN HYDROCHLORIDE 1 MG/1
1 CAPSULE ORAL AT BEDTIME
Status: DISCONTINUED | OUTPATIENT
Start: 2018-08-26 | End: 2018-08-27 | Stop reason: HOSPADM

## 2018-08-26 RX ORDER — ARIPIPRAZOLE 2 MG/1
2 TABLET ORAL DAILY
Status: DISCONTINUED | OUTPATIENT
Start: 2018-08-26 | End: 2018-08-27 | Stop reason: HOSPADM

## 2018-08-26 RX ORDER — OLANZAPINE 10 MG/2ML
10 INJECTION, POWDER, FOR SOLUTION INTRAMUSCULAR
Status: DISCONTINUED | OUTPATIENT
Start: 2018-08-26 | End: 2018-08-27 | Stop reason: HOSPADM

## 2018-08-26 RX ORDER — NICOTINE 21 MG/24HR
1 PATCH, TRANSDERMAL 24 HOURS TRANSDERMAL DAILY
Status: DISCONTINUED | OUTPATIENT
Start: 2018-08-26 | End: 2018-08-27 | Stop reason: HOSPADM

## 2018-08-26 RX ORDER — BISACODYL 10 MG
10 SUPPOSITORY, RECTAL RECTAL DAILY PRN
Status: DISCONTINUED | OUTPATIENT
Start: 2018-08-26 | End: 2018-08-27 | Stop reason: HOSPADM

## 2018-08-26 RX ORDER — ALUMINA, MAGNESIA, AND SIMETHICONE 2400; 2400; 240 MG/30ML; MG/30ML; MG/30ML
30 SUSPENSION ORAL EVERY 4 HOURS PRN
Status: DISCONTINUED | OUTPATIENT
Start: 2018-08-26 | End: 2018-08-27 | Stop reason: HOSPADM

## 2018-08-26 RX ORDER — NALOXONE HYDROCHLORIDE 0.4 MG/ML
.1-.4 INJECTION, SOLUTION INTRAMUSCULAR; INTRAVENOUS; SUBCUTANEOUS
Status: DISCONTINUED | OUTPATIENT
Start: 2018-08-26 | End: 2018-08-27 | Stop reason: HOSPADM

## 2018-08-26 RX ORDER — ACETAMINOPHEN 500 MG
1000 TABLET ORAL EVERY 6 HOURS PRN
Status: DISCONTINUED | OUTPATIENT
Start: 2018-08-26 | End: 2018-08-27 | Stop reason: HOSPADM

## 2018-08-26 RX ORDER — TRAMADOL HYDROCHLORIDE 50 MG/1
50 TABLET ORAL EVERY 6 HOURS PRN
Status: DISCONTINUED | OUTPATIENT
Start: 2018-08-26 | End: 2018-08-27 | Stop reason: HOSPADM

## 2018-08-26 RX ADMIN — TRAMADOL HYDROCHLORIDE 50 MG: 50 TABLET, COATED ORAL at 10:04

## 2018-08-26 RX ADMIN — ARIPIPRAZOLE 2 MG: 2 TABLET ORAL at 11:29

## 2018-08-26 RX ADMIN — MIRTAZAPINE 15 MG: 15 TABLET, FILM COATED ORAL at 20:33

## 2018-08-26 RX ADMIN — PRAZOSIN HYDROCHLORIDE 1 MG: 1 CAPSULE ORAL at 20:33

## 2018-08-26 RX ADMIN — NICOTINE 1 PATCH: 21 PATCH, EXTENDED RELEASE TRANSDERMAL at 10:04

## 2018-08-26 ASSESSMENT — ACTIVITIES OF DAILY LIVING (ADL)
BATHING: 0-->INDEPENDENT
SWALLOWING: 0-->SWALLOWS FOODS/LIQUIDS WITHOUT DIFFICULTY
DRESS: 0-->INDEPENDENT
RETIRED_COMMUNICATION: 0-->UNDERSTANDS/COMMUNICATES WITHOUT DIFFICULTY
RETIRED_EATING: 0-->INDEPENDENT
FALL_HISTORY_WITHIN_LAST_SIX_MONTHS: NO
GROOMING: INDEPENDENT
AMBULATION: 0-->INDEPENDENT
COGNITION: 0 - NO COGNITION ISSUES REPORTED
TOILETING: 0-->INDEPENDENT
DRESS: INDEPENDENT
ORAL_HYGIENE: INDEPENDENT
TRANSFERRING: 0-->INDEPENDENT
LAUNDRY: WITH SUPERVISION

## 2018-08-26 NOTE — PLAN OF CARE
Problem: Occupational Therapy Goals (Adult)  Goal: Occupational Therapy Goals  Stand Alone Therapy Goal      Pt actively participated in stress management group. Education was provided on strategies to reduce and manage stress within daily routine including interactive worksheet and discussion. Accepting and engaged throughout resistance band exercises and calming strategies offered as a means to reduce stress.

## 2018-08-26 NOTE — IP AVS SNAPSHOT
MRN:[id number]                      After Visit Summary   8/26/2018    Jose Alberto Lu    MRN: [id number]           Thank you!     Thank you for choosing [facility] for your care. Our goal is always to provide you with excellent care.        Patient Information     Date Of Birth          1975        Designated Caregiver       Most Recent Value    Caregiver    Will someone help with your care after discharge? no    Name of designated caregiver NA    Phone number of caregiver NA      About your hospital stay     You were admitted on:  August 26, 2018 You last received care in the:  [facility]    You were discharged on:  August 27, 2018       Who to Call     For medical emergencies, please call 911.  For non-urgent questions about your medical care, please call your primary care provider or clinic, [phone]          Attending Provider     Provider Claudio Dewey MD Psychiatry       Primary Care Provider Office Phone # Fax #    MARIO ALBERTO Wolf [phone]      Your next 10 appointments already scheduled     Sep 06, 2018  1:30 PM CDT   Treatment with Diya Rodriguez, PT   [facility] ([facility] )    [address] [place] [address]   [phone]              Further instructions from your care team        Behavioral Discharge Planning and Instructions      Summary:  You were admitted on 8/26/2018  due to Suicidal Ideations.  You were treated by Dr. Claudio Crowder MD and Dr. Tanvir Kulkarni MD and discharged on 8/27/2018 from [facility] . You have stated that you are not a risk to harm yourself or others. You have stated that you want to go home; staff have advised you that it would be better for you to stay. You are being discharged AMA.    Principal Diagnosis:   1.  Probable bipolar illness type II, I am uncertain if a schizoaffective diagnosis came from a time when he was using methamphetamine or not.   2.   Generalized anxiety disorder.   3.  Traumatic brain injury.   4.  Attention-deficit hyperactivity disorder.   5.  Posttraumatic stress disorder.         Health Care Follow-up Appointments:   Please follow up with your current provider at [facility]:  Olivia Mckeon  [address] [place], [address]  Phone:  [phone]       Attend all scheduled appointments with your outpatient providers. Call at least 24 hours in advance if you need to reschedule an appointment to ensure continued access to your outpatient providers.   Major Treatments, Procedures and Findings:  You were provided with: a psychiatric assessment, medication evaluation and/or management, group therapy and milieu management    Symptoms to Report: feeling more aggressive, increased confusion, losing more sleep, mood getting worse or thoughts of suicide    Early warning signs can include: increased depression or anxiety sleep disturbances increased thoughts or behaviors of suicide or self-harm  increased unusual thinking, such as paranoia or hearing voices    Safety and Wellness:  Take all medicines as directed.  Make no changes unless your doctor suggests them.      Follow treatment recommendations.  Refrain from alcohol and non-prescribed drugs.  If there is a concern for safety, call 911.    Resources:   Crisis Intervention: 591.922.9573 or 163-194-4535 (TTY: 820.588.4383).  Call anytime for help.  National [place] on Mental Illness (www.[place].brenda.org): 409.164.2499 or 611-668-9731.  Alcoholics Anonymous (www.alcoholics-anonymous.org): Check your phone book for your local chapter.  Suicide Awareness Voices of Education (SAVE) (www.save.org): 628-759-MZTW (8683)  National Suicide Prevention Line (www.mentalhealthmn.org): 906-560-LFWP (9234)  Mental Health Consumer/Survivor Network of [place] (www.mhcsn.net): 419.982.7346 or 716-289-8377  Mental Health Association of [place] (www.mentalhealth.org): 979.688.4101 or 921-110-4359  Self- Management  "and Recovery Training., SMART-- Toll free: [phone]  www.[employer]    The treatment team has appreciated the opportunity to work with you.     If you have any questions or concerns our unit number is [phone]  You may be receiving a follow-up phone call within the next three days from a representative from behavioral health.            Pending Results     No orders found for last 3 day(s).            Statement of Approval     Ordered          18 1503  I have reviewed and agree with all the recommendations and orders detailed in this document.  EFFECTIVE NOW     Approved and electronically signed by:  Claudio Crowder MD             Admission Information     Date & Time Provider Department Dept. Phone    2018 Claudio Crowder MD [facility] [phone]      Your Vitals Were     Blood Pressure Pulse Temperature Respirations Height Weight    114/76 76 97  F (36.1  C) (Oral) 16 1.753 m (5' 9\") 65.5 kg (144 lb 6.4 oz)    BMI (Body Mass Index)                   21.32 kg/m2           [employer] Information     [employer] lets you send messages to your doctor, view your test results, renew your prescriptions, schedule appointments and more. To sign up, go to [email or web address] . Click on \"Log in\" on the left side of the screen, which will take you to the Welcome page. Then click on \"Sign up Now\" on the right side of the page.     You will be asked to enter the access code listed below, as well as some personal information. Please follow the directions to create your username and password.     Your access code is: [email or web address]  Expires: 10/10/2018  5:16 PM     Your access code will  in 90 days. If you need help or a new code, please call your [facility] clinic or [phone].        Care EveryWhere ID     This is your Care EveryWhere ID. This could be used by other organizations to access your [facility] medical records  [id number]        Equal Access to Services     ZABRINA WAN AH: " Hadii aad yeni arboledao Soomaali, waaxda luqadaha, qaybta kaalmada aderegis, guido renettain hayaan kirtchristi gonzáles laheronmame lindsay. So [facility] [phone].    ATENCIÓN: Si elena ervin, tiene a hernández disposición servicios gratuitos de asistencia lingüística. Llame al [phone].    We comply with applicable federal civil rights laws and [place] laws. We do not discriminate on the basis of race, color, national origin, age, disability, sex, sexual orientation, or gender identity.               Review of your medicines      CONTINUE these medicines which have NOT CHANGED        Dose / Directions    acetaminophen 500 MG tablet   Commonly known as:  TYLENOL   Used for:  Left-sided low back pain with left-sided sciatica, unspecified chronicity        Dose:  1000 mg   Take 2 tablets (1,000 mg) by mouth every 6 hours as needed for mild pain   Quantity:  100 tablet   Refills:  0       traMADol 50 MG tablet   Commonly known as:  ULTRAM        Dose:  50 mg   Take 1 tablet (50 mg) by mouth every 6 hours as needed for severe pain   Quantity:  10 tablet   Refills:  0                Protect others around you: Learn how to safely use, store and throw away your medicines at www.disposemymeds.org.             Medication List: This is a list of all your medications and when to take them. Check marks below indicate your daily home schedule. Keep this list as a reference.      Medications           Morning Afternoon Evening Bedtime As Needed    acetaminophen 500 MG tablet   Commonly known as:  TYLENOL   Take 2 tablets (1,000 mg) by mouth every 6 hours as needed for mild pain   Last time this was given:  500 mg on 8/27/2018  2:50 PM                                traMADol 50 MG tablet   Commonly known as:  ULTRAM   Take 1 tablet (50 mg) by mouth every 6 hours as needed for severe pain   Last time this was given:  50 mg on 8/27/2018  2:50 PM

## 2018-08-26 NOTE — IP AVS SNAPSHOT
[address]    [address]    [place] [address]    Phone:  [phone]                                       After Visit Summary   8/26/2018    Jose Alberto Lu    MRN: [id number]           After Visit Summary Signature Page     I have received my discharge instructions, and my questions have been answered. I have discussed any challenges I see with this plan with the nurse or doctor.    ..........................................................................................................................................  Patient/Patient Representative Signature      ..........................................................................................................................................  Patient Representative Print Name and Relationship to Patient    ..................................................               ................................................  Date                                            Time    ..........................................................................................................................................  Reviewed by Signature/Title    ...................................................              ..............................................  Date                                                            Time          22EPIC Rev 08/18

## 2018-08-26 NOTE — ED NOTES
Called Phoebe from [facility] and informed her on the patients probation status. Patient then was declined to facility due to probation status.
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
## 2018-08-26 NOTE — ED NOTES
Central intake called and states patient has been accepted at [facility] [address] by Dr. Crowder. Phone number for report is [phone].
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
## 2018-08-26 NOTE — ED PROVIDER NOTES
History     Chief Complaint   Patient presents with     Psychiatric Evaluation     suicidal ideation     HPI  Jose Alberto Lu is a 43 year old male who presents to the emergency department unaccompanied.  Patient is feeling suicidal.  Patient has been thinking about suicide for several days but the feelings are especially worsened today.  He recently had a loss and his uncle .  He has been treated for general anxiety disorder and schizoaffective disorder in the past.  He supposed to be on medications for depression but he has not used them for a long time.  He has a definite plan to kill himself by hanging himself from the ceiling in his house.  He is currently undergoing a lot of stress in his life.  He admits to using marijuana but not any other psychoactive substances.    Problem List:    Patient Active Problem List    Diagnosis Date Noted     Suicidal ideation 2018     Priority: Medium     Polysubstance abuse 2011     Priority: Medium     MARY (generalized anxiety disorder) 2009     Priority: Medium     Overview:   IMO Update 10/11       GERD (gastroesophageal reflux disease) 2009     Priority: Medium     Hypothyroidism 2009     Priority: Medium     Schizoaffective disorder (H) 2009     Priority: Medium     Dysthymic disorder 2008     Priority: Medium        Past Medical History:    No past medical history on file.    Past Surgical History:    No past surgical history on file.    Family History:    No family history on file.    Social History:  Marital Status:  Single [1]  Social History   Substance Use Topics     Smoking status: Current Every Day Smoker     Packs/day: 0.50     Years: 20.00     Types: Cigarettes     Start date: 1993     Smokeless tobacco: Never Used     Alcohol use No        Medications:      No current outpatient prescriptions on file.      Review of Systems   All other systems reviewed and are negative.      Physical Exam   BP: (!)  132/102  Pulse: 105  Temp: 96.9  F (36.1  C)  Resp: 19  SpO2: 98 %      Physical Exam   Constitutional: He is oriented to person, place, and time. He appears well-developed and well-nourished. No distress.   HENT:   Head: Atraumatic.   Cardiovascular: Normal rate, regular rhythm and normal heart sounds.    Pulmonary/Chest: Effort normal and breath sounds normal. No respiratory distress. He has no wheezes.   Abdominal: Soft. Bowel sounds are normal. He exhibits no distension. There is no tenderness.   Neurological: He is alert and oriented to person, place, and time.   Skin: He is not diaphoretic.   Psychiatric: His speech is delayed. He is withdrawn. He expresses inappropriate judgment. He exhibits a depressed mood. He expresses suicidal ideation. He expresses suicidal plans.   Nursing note and vitals reviewed.      ED Course     ED Course     Procedures       Results for orders placed or performed during the hospital encounter of 08/26/18 (from the past 24 hour(s))   Drug Screen Urine (Range)   Result Value Ref Range    Amphetamine Qual Urine Negative NEG^Negative    Barbiturates Qual Urine Negative NEG^Negative    Benzodiazepine Qual Urine Negative NEG^Negative    Cannabinoids Qual Urine Positive (A) NEG^Negative    Cocaine Qual Urine Negative NEG^Negative    Opiates Qualitative Urine Negative NEG^Negative    Methadone Qual Urine Negative NEG^Negative    PCP Qual Urine Negative NEG^Negative   UA reflex to Microscopic and Culture   Result Value Ref Range    Color Urine Light Yellow     Appearance Urine Clear     Glucose Urine Negative NEG^Negative mg/dL    Bilirubin Urine Negative NEG^Negative    Ketones Urine Negative NEG^Negative mg/dL    Specific Gravity Urine 1.010 1.003 - 1.035    Blood Urine Negative NEG^Negative    pH Urine 6.0 4.7 - 8.0 pH    Protein Albumin Urine Negative NEG^Negative mg/dL    Urobilinogen mg/dL Normal 0.0 - 2.0 mg/dL    Nitrite Urine Negative NEG^Negative    Leukocyte Esterase Urine  Negative NEG^Negative    Source Midstream Urine    Acetaminophen level   Result Value Ref Range    Acetaminophen Level <2 mg/L   Alcohol ethyl   Result Value Ref Range    Ethanol g/dL <0.01 0.01 g/dL   CBC with platelets differential   Result Value Ref Range    WBC 9.3 4.0 - 11.0 10e9/L    RBC Count 4.71 4.4 - 5.9 10e12/L    Hemoglobin 15.3 13.3 - 17.7 g/dL    Hematocrit 43.3 40.0 - 53.0 %    MCV 92 78 - 100 fl    MCH 32.5 26.5 - 33.0 pg    MCHC 35.3 31.5 - 36.5 g/dL    RDW 12.7 10.0 - 15.0 %    Platelet Count 272 150 - 450 10e9/L    Diff Method Automated Method     % Neutrophils 54.4 %    % Lymphocytes 35.8 %    % Monocytes 7.7 %    % Eosinophils 1.8 %    % Basophils 0.1 %    % Immature Granulocytes 0.2 %    Nucleated RBCs 0 0 /100    Absolute Neutrophil 5.0 1.6 - 8.3 10e9/L    Absolute Lymphocytes 3.3 0.8 - 5.3 10e9/L    Absolute Monocytes 0.7 0.0 - 1.3 10e9/L    Absolute Eosinophils 0.2 0.0 - 0.7 10e9/L    Absolute Basophils 0.0 0.0 - 0.2 10e9/L    Abs Immature Granulocytes 0.0 0 - 0.4 10e9/L    Absolute Nucleated RBC 0.0    Comprehensive metabolic panel   Result Value Ref Range    Sodium 142 133 - 144 mmol/L    Potassium 4.2 3.4 - 5.3 mmol/L    Chloride 106 94 - 109 mmol/L    Carbon Dioxide 28 20 - 32 mmol/L    Anion Gap 8 3 - 14 mmol/L    Glucose 108 (H) 70 - 99 mg/dL    Urea Nitrogen 22 7 - 30 mg/dL    Creatinine 1.22 0.66 - 1.25 mg/dL    GFR Estimate 65 >60 mL/min/1.7m2    GFR Estimate If Black 78 >60 mL/min/1.7m2    Calcium 9.1 8.5 - 10.1 mg/dL    Bilirubin Total 0.6 0.2 - 1.3 mg/dL    Albumin 4.2 3.4 - 5.0 g/dL    Protein Total 7.7 6.8 - 8.8 g/dL    Alkaline Phosphatase 57 40 - 150 U/L    ALT 24 0 - 70 U/L    AST 11 0 - 45 U/L   Salicylate level   Result Value Ref Range    Salicylate Level <2 mg/dL   TSH   Result Value Ref Range    TSH 1.38 0.40 - 4.00 mU/L       Medications - No data to display    Assessments & Plan (with Medical Decision Making)   Suicidal ideation and cannabis abuse: Patient presented to  the emergency department with suicidal ideation and a plan to kill himself by hanging or overdosing on medication.  He also tested positive for cannabis and urine and he admitted to chronic use of marijuana.  He was evaluated by [facility] psychologist who recommended inpatient psychiatric placement.  Laboratory tests done today showed normal TSH, CBC, CMP, negative acetaminophen and salicylate, negative ethanol and normal urinalysis.  Urine drug screen was positive for cannabinoids.  Patient has a long history of psychiatric problems including depression but has never attempted suicide in the past.  He is currently undergoing a lot of stress at home.  He has been accepted at [facility] and is being transferred for inpatient psychiatric treatment.    I have reviewed the nursing notes.    I have reviewed the findings, diagnosis, plan and need for follow up with the patient.    Discharge Medication List as of 8/26/2018  3:14 AM          Final diagnoses:   Suicidal ideation   Cannabis abuse       8/25/2018   [facility]     Lokesh Gates MD  08/26/18 2024
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
## 2018-08-26 NOTE — PROGRESS NOTES
BELONGINGS:  1.     Shoes (brown, [employer])  2.     Fleece Pullover  3.     Sweat pants (burgandy, Holister, with drawstring)  4.     Backpack (Red)  5.     Small Bible  6.     Lighter (blue,BIC)  7.     Cigarette box (pink, 4 - Cigarettes)  8.     Tobacco (1 - Bag of rolling tobacco)  9.     Walkman (NICOLA, small cassette player)  10.   Speaker Box (white)  11.   External Hard Drive with USB cord   12.   Smart Phone ([employer])  13.   Power Cord  14.   $5.00 CASH  15.   Cap (Guiness)  <<<<<<<<<<<<<<<<<<<<<<<<<<     Above items placed in locker #12.    >>>>>>>>>>>>>>>>>>>>>>>  SECURITY:  *No valuables sent to security upon admission.        A               Admission:  I am responsible for any personal items that are not sent to the safe or pharmacy.  [facility] is not responsible for loss, theft or damage of any property in my possession.    Signature:  _________________________________ Date: _______  Time: _____                                              Staff Signature:  ____________________________ Date: ________  Time: _____      2nd Staff person, if patient is unable/unwilling to sign:    Signature: ________________________________ Date: ________  Time: _____     Discharge:  [facility] has returned all of my personal belongings:    Signature: _________________________________ Date: ________  Time: _____                                          Staff Signature:  ____________________________ Date: ________  Time: _____

## 2018-08-26 NOTE — ED NOTES
[facility] here to  patient and transport him to [facility]. Bedside report given to [facility] crew. Vitals stable and as charted. 3 personal belongings bags handed to ambulance crew to bring with patient to [facility]. Report called to HARSHAL Gong, at [address]. Patient left ED in care of [facility].

## 2018-08-26 NOTE — PROGRESS NOTES
"Patient requested/given PRN tramadol for \"chronic pain-neck-jaw (TMJ) and hernia.\"    /69  Pulse 55  Temp 97  F (36.1  C) (Oral)  Resp 16  Ht 1.753 m (5' 9\")  Wt 65.5 kg (144 lb 6.4 oz)  BMI 21.32 kg/m2  Nursing will continue to monitor.  "

## 2018-08-27 VITALS
SYSTOLIC BLOOD PRESSURE: 114 MMHG | RESPIRATION RATE: 16 BRPM | HEIGHT: 69 IN | TEMPERATURE: 97 F | HEART RATE: 76 BPM | WEIGHT: 144.4 LBS | DIASTOLIC BLOOD PRESSURE: 76 MMHG | BODY MASS INDEX: 21.39 KG/M2

## 2018-08-27 LAB
CHOLEST SERPL-MCNC: 230 MG/DL
HDLC SERPL-MCNC: 50 MG/DL
LDLC SERPL CALC-MCNC: 154 MG/DL
NONHDLC SERPL-MCNC: 180 MG/DL
TRIGL SERPL-MCNC: 132 MG/DL

## 2018-08-27 PROCEDURE — G0177 OPPS/PHP; TRAIN & EDUC SERV: HCPCS

## 2018-08-27 PROCEDURE — 25000132 ZZH RX MED GY IP 250 OP 250 PS 637: Performed by: PSYCHIATRY & NEUROLOGY

## 2018-08-27 PROCEDURE — 36415 COLL VENOUS BLD VENIPUNCTURE: CPT | Performed by: PSYCHIATRY & NEUROLOGY

## 2018-08-27 PROCEDURE — H2032 ACTIVITY THERAPY, PER 15 MIN: HCPCS

## 2018-08-27 PROCEDURE — 99238 HOSP IP/OBS DSCHRG MGMT 30/<: CPT | Performed by: PSYCHIATRY & NEUROLOGY

## 2018-08-27 PROCEDURE — 80061 LIPID PANEL: CPT | Performed by: PSYCHIATRY & NEUROLOGY

## 2018-08-27 PROCEDURE — 99207 ZZC CDG-CODE INCORRECT PER BILLING BASED ON TIME: CPT | Performed by: PSYCHIATRY & NEUROLOGY

## 2018-08-27 PROCEDURE — 99232 SBSQ HOSP IP/OBS MODERATE 35: CPT | Performed by: PHYSICIAN ASSISTANT

## 2018-08-27 RX ADMIN — ACETAMINOPHEN 1000 MG: 500 TABLET ORAL at 08:30

## 2018-08-27 RX ADMIN — ACETAMINOPHEN 500 MG: 500 TABLET ORAL at 14:50

## 2018-08-27 RX ADMIN — NICOTINE 1 PATCH: 21 PATCH, EXTENDED RELEASE TRANSDERMAL at 08:27

## 2018-08-27 RX ADMIN — TRAMADOL HYDROCHLORIDE 50 MG: 50 TABLET, COATED ORAL at 08:27

## 2018-08-27 RX ADMIN — TRAMADOL HYDROCHLORIDE 50 MG: 50 TABLET, COATED ORAL at 14:50

## 2018-08-27 RX ADMIN — ARIPIPRAZOLE 2 MG: 2 TABLET ORAL at 08:27

## 2018-08-27 ASSESSMENT — ACTIVITIES OF DAILY LIVING (ADL)
ORAL_HYGIENE: INDEPENDENT
GROOMING: SHOWER
LAUNDRY: WITH SUPERVISION
DRESS: SCRUBS (BEHAVIORAL HEALTH)
DRESS: INDEPENDENT
ORAL_HYGIENE: INDEPENDENT
GROOMING: INDEPENDENT

## 2018-08-27 NOTE — PROGRESS NOTES
Pt was isolative and stayed in his room the majority of the shift. Pt came out for meals. Pt was guarded on approach and refused to check in with staff this evening. No observed SI or SIB.      08/26/18 2000   Behavioral Health   Hallucinations denies / not responding to hallucinations   Thinking poor concentration   Orientation person: oriented;date: oriented;place: oriented;time: oriented   Memory baseline memory   Insight insight appropriate to events   Judgement impaired   Eye Contact at examiner   Affect blunted, flat   Mood mood is calm   Physical Appearance/Attire untidy   Suicidality other (see comments)  (denied)   1. Wish to be Dead No   Non-Specific Active Suicidal Thought Description no   3. Active Sucidal Ideation with any Methods (Not Plan) Without Intent to Act  No   4. Active Suicidal Ideation with Some Intent to Act, Without Specific Plan  No   Duration (Lifetime) 5   Self Injury other (see comment)  (Denied)   Elopement (None observed)   Activity isolative   Speech coherent   Medication Sensitivity no stated side effects   Psychomotor / Gait steady;balanced   Psycho Education   Type of Intervention 1:1 intervention   Response participates, initiates socially appropriate   Hours 0.5   Treatment Detail Check in   Activities of Daily Living   Hygiene/Grooming independent   Oral Hygiene independent   Dress independent   Laundry with supervision   Room Organization independent

## 2018-08-27 NOTE — CONSULTS
[facility]  Internal Medicine Consult     Jose Albetro Lu MRN# [id number]   Age: 43 year old YOB: 1975     Date of Admission: 8/26/2018  Date of Consult:  8/27/2018    Primary Care Provider: Olivia Mckeon    Requesting Service: Dr. English  Reason for Consult: General Medical Evaluation      SUBJECTIVE   CC:   Neck pain    Assessment and Plan/Recommendations:   Jose Alberto Lu is a 43 year old male with history of TMJ, TBI, anxiety, schizoaffective disorder, depression and SI who was admitted to station 30 with SI    Jaw pain, likely TMJ: Pain with eating, opening mouth on the right side for about a month. Radiation into the neck and ear. No concerning infectious symptoms. Has large cavity in left upper molar but no s/s abscess in the mouth. Afebrile.   - PT consult   - Contact medicine if changes or new concerns arise   - Hot packs, tylenol prn     Depression, h/o TBI, SI: Presented to the ED with worsening anxiety and SI  - Management per psych     Currently, medically stable and internal medicine will sign off. Please contact if future questions or concerns arise. Thank you for the opportunity to be a part of this patient's care.      Bere Fisher  Internal Medicine [facility] Hospitalist  [phone]  August 27, 2018         HPI:   Jose Alberto Lu is a 43 year old male with history of TMJ, TBI, anxiety, schizoaffective disorder, depression and SI who was admitted to station 30 with SI    Patient reports he was told he had TJM about a month ago. He was supposed to start PT but has not been able to set up appointment yet. Reports pain with eating and speaking. He told nursing he was unable to eat, but per nursing was seen in the common area enjoying breakfast. He reports radiation of pain in the neck and upper face. Denies s/s of infection. No recent fever or chills. Reports 'bad tooth' on the left side of the mouth. No difficulty breathing or swallowing.  "Denies chest pain or dyspnea        Past Medical History:     Past Medical History:   Diagnosis Date     TMJ (dislocation of temporomandibular joint)         Reviewed and updated in [facility].     Past Surgical History:      Past Surgical History:   Procedure Laterality Date     BACK SURGERY  2009 or 2010     HAND SURGERY Right 1996     HERNIA REPAIR  2009 or 2010         Social History:   Tobacco use: 1/2 to 1 ppd  Alcohol use: Denies  Elicit drug use: Marijuana       Family History:     Family History   Problem Relation Age of Onset     Family history unknown: Yes         Allergies:   No Known Allergies      Medications:   Reviewed. Please see MAR     Review of Systems:   10 point ROS of systems including Constitutional, Eyes, Respiratory, Cardiovascular, Gastroenterology, Genitourinary, Integumentary, Muscularskeletal, Psychiatric were all negative except for pertinent positives noted in my HPI.      OBJECTIVE   Physical Exam:   Vitals were reviewed  Blood pressure 114/76, pulse 76, temperature 97  F (36.1  C), temperature source Oral, resp. rate 16, height 1.753 m (5' 9\"), weight 65.5 kg (144 lb 6.4 oz).  General: Alert, NAD, hyperactive  HEENT: Tenderness at the right mandibular fossa. No swelling in the neck, no lymphadenopathy. Left upper molar cracked but no s/s abscess in the mouth. Ability to open mouth decreased 2/2 pain. Anicteric sclera, EOMI, membranes moist  Neck: Supple  Lungs: Breathing unlabored  Vascular: No peripheral edema  Neurologic: AAO X 3, no focal deficit. CN II-XII intact. Normal gait and station. Gross sensation intact  Neuropsychiatric: Per psych  Skin: No jaundice, rashes, or lesions        Data:        Lab Results   Component Value Date     08/26/2018    Lab Results   Component Value Date    CHLORIDE 106 08/26/2018    Lab Results   Component Value Date    BUN 22 08/26/2018      Lab Results   Component Value Date    POTASSIUM 4.2 08/26/2018    Lab Results   Component Value Date    CO2 " 28 08/26/2018    Lab Results   Component Value Date    CR 1.22 08/26/2018        Lab Results   Component Value Date    WBC 9.3 08/26/2018    HGB 15.3 08/26/2018    HCT 43.3 08/26/2018    MCV 92 08/26/2018     08/26/2018     Lab Results   Component Value Date    WBC 9.3 08/26/2018

## 2018-08-27 NOTE — PROGRESS NOTES
"Patient was very angry and demanded to leave at once. Patient signed a 12 hour intent to leave. Patient was given option to discharge against medication advice. Patient was angry that \"I can't use my cellphone here. I had no idea this place was such a shithole.\" Patient kept complaining that he waiting to see someone about his TMJ. Medicine did see him about this issue. Patient scoffed at any attempt of staff to help patient, and at internal medicine's recommendations for his TMJ. Patient verbally abusive and mocking of staff. Any attempt at this writer to explain things was met with patient arguing.  "

## 2018-08-27 NOTE — PLAN OF CARE
"Problem: Depressive Symptoms  Goal: Depressive Symptoms  Signs and symptoms of listed problems will be absent or manageable.   Outcome: Adequate for Discharge Date Met: 08/27/18  Patient discharged at his request, AMA without medications, at 1615.  He made no requests for transportation, although stated that he had come in with $45, instead of the $5 that was listed on his valuables list, which was cosigned by two staff.  He was given patient relations number to call should he not find his money elsewhere.  All listed belongings were returned to him, although he refused to sign valuables list.  Patient denied suicidal and homicidal ideation.  Discharge instructions were reviewed with patient.  When asked if he had any further questions, patient responded angrily \"why didn't you help me with my TMJ?\"  Writer suggested he may want to consult a dentist about this.  Patient replied \"then it's your responsibility to transfer me to another hospital.\"  Patient was informed that his discharge was AMA, and he was at liberty to seek additional treatment at a facility of his choice after discharge.      "

## 2018-08-27 NOTE — DISCHARGE INSTRUCTIONS
Behavioral Discharge Planning and Instructions      Summary:  You were admitted on 8/26/2018  due to Suicidal Ideations.  You were treated by Dr. Claudio Crowder MD and Dr. Tanvir Kulkarni MD and discharged on 8/27/2018 [facility] . You have stated that you are not a risk to harm yourself or others. You have stated that you want to go home; staff have advised you that it would be better for you to stay. You are being discharged AMA.    Principal Diagnosis:   1.  Probable bipolar illness type II, I am uncertain if a schizoaffective diagnosis came from a time when he was using methamphetamine or not.   2.  Generalized anxiety disorder.   3.  Traumatic brain injury.   4.  Attention-deficit hyperactivity disorder.   5.  Posttraumatic stress disorder.         Health Care Follow-up Appointments:   Please follow up with your current provider at [facility]:  Olivia Mckeon  [address] [place], [place], [address]  Phone:  [phone]       Attend all scheduled appointments with your outpatient providers. Call at least 24 hours in advance if you need to reschedule an appointment to ensure continued access to your outpatient providers.   Major Treatments, Procedures and Findings:  You were provided with: a psychiatric assessment, medication evaluation and/or management, group therapy and milieu management    Symptoms to Report: feeling more aggressive, increased confusion, losing more sleep, mood getting worse or thoughts of suicide    Early warning signs can include: increased depression or anxiety sleep disturbances increased thoughts or behaviors of suicide or self-harm  increased unusual thinking, such as paranoia or hearing voices    Safety and Wellness:  Take all medicines as directed.  Make no changes unless your doctor suggests them.      Follow treatment recommendations.  Refrain from alcohol and non-prescribed drugs.  If there is a concern for safety, call 911.    Resources:   Crisis  Intervention: 588.118.1637 or 436-699-6101 (TTY: 494.241.5541).  Call anytime for help.  National [place] on Mental Illness (www.[place].brenda.org): 752.682.1389 or 671-288-8230.  Alcoholics Anonymous (www.alcoholics-anonymous.org): Check your phone book for your local chapter.  Suicide Awareness Voices of Education (SAVE) (www.save.org): 226-777-SRKL (9106)  National Suicide Prevention Line (www.mentalhealthmn.org): 995-473-DGNT (5086)  Mental Health Consumer/Survivor Network of [place] (www.mhcsn.net): 435.892.8717 or 809-256-7838  Mental Health Association of [place] (www.mentalhealth.org): 832.962.2558 or 487-487-2056  Self- Management and Recovery Training., SMART-- Toll free: 479.438.5643  www.[employer].org    The treatment team has appreciated the opportunity to work with you.     If you have any questions or concerns our unit number is [phone]  You may be receiving a follow-up phone call within the next three days from a representative from behavioral health.

## 2018-08-27 NOTE — PLAN OF CARE
Problem: Patient Care Overview  Goal: Plan of Care/Patient Progress Review  PT order received and chart reviewed. Order currently for TMJ. This is not in the scope of an inpatient PTs practice (spoke with another coworker as well). Spoke with nurse on unit to let them know. At discharge patient can follow back up with OP PT. If patient having difficulty eating on unit may think of a speech consult vs PT for some possible strategies. Of note the nurse indicated that she did see him eating on the unit fine. Also the medicine note states that he has a large cavity which is likely causing pain leading to difficulty eating. At this time plan to complete orders but remain open to re-consult if needed. Please contact [phone] with any further questions.
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
## 2018-09-06 ENCOUNTER — HOSPITAL ENCOUNTER (OUTPATIENT)
Dept: PHYSICAL THERAPY | Facility: HOSPITAL | Age: 43
Setting detail: THERAPIES SERIES
End: 2018-09-06
Attending: NURSE PRACTITIONER
Payer: COMMERCIAL

## 2018-09-06 PROCEDURE — 40000718 ZZHC STATISTIC PT DEPARTMENT ORTHO VISIT

## 2018-09-06 PROCEDURE — 97110 THERAPEUTIC EXERCISES: CPT | Mod: GP

## 2018-09-06 PROCEDURE — 97012 MECHANICAL TRACTION THERAPY: CPT | Mod: GP

## 2018-09-10 ENCOUNTER — HOSPITAL ENCOUNTER (OUTPATIENT)
Dept: PHYSICAL THERAPY | Facility: HOSPITAL | Age: 43
Setting detail: THERAPIES SERIES
End: 2018-09-10
Attending: NURSE PRACTITIONER
Payer: COMMERCIAL

## 2018-09-10 PROCEDURE — 97110 THERAPEUTIC EXERCISES: CPT | Mod: GP

## 2018-09-10 PROCEDURE — 97012 MECHANICAL TRACTION THERAPY: CPT | Mod: GP

## 2018-09-10 PROCEDURE — 40000718 ZZHC STATISTIC PT DEPARTMENT ORTHO VISIT

## 2019-03-20 ENCOUNTER — HOSPITAL ENCOUNTER (EMERGENCY)
Facility: HOSPITAL | Age: 44
Discharge: HOME | End: 2019-03-20
Attending: PHYSICIAN ASSISTANT | Admitting: PHYSICIAN ASSISTANT
Payer: COMMERCIAL

## 2019-03-20 VITALS
HEART RATE: 93 BPM | SYSTOLIC BLOOD PRESSURE: 119 MMHG | OXYGEN SATURATION: 96 % | RESPIRATION RATE: 16 BRPM | DIASTOLIC BLOOD PRESSURE: 81 MMHG | TEMPERATURE: 99.2 F

## 2019-03-20 DIAGNOSIS — J02.0 STREPTOCOCCAL PHARYNGITIS: Primary | ICD-10-CM

## 2019-03-20 LAB
DEPRECATED S PYO AG THROAT QL EIA: ABNORMAL
FLUAV+FLUBV RNA SPEC QL NAA+PROBE: NEGATIVE
FLUAV+FLUBV RNA SPEC QL NAA+PROBE: NEGATIVE
RSV RNA SPEC NAA+PROBE: NEGATIVE
SPECIMEN SOURCE: ABNORMAL
SPECIMEN SOURCE: NORMAL

## 2019-03-20 PROCEDURE — 87631 RESP VIRUS 3-5 TARGETS: CPT | Performed by: FAMILY MEDICINE

## 2019-03-20 PROCEDURE — 87880 STREP A ASSAY W/OPTIC: CPT | Performed by: FAMILY MEDICINE

## 2019-03-20 PROCEDURE — G0463 HOSPITAL OUTPT CLINIC VISIT: HCPCS

## 2019-03-20 PROCEDURE — 99213 OFFICE O/P EST LOW 20 MIN: CPT | Mod: Z6 | Performed by: PHYSICIAN ASSISTANT

## 2019-03-20 RX ORDER — PENICILLIN V POTASSIUM 500 MG/1
500 TABLET, FILM COATED ORAL 2 TIMES DAILY
Qty: 20 TABLET | Refills: 0 | Status: SHIPPED | OUTPATIENT
Start: 2019-03-20 | End: 2019-09-09

## 2019-03-21 ASSESSMENT — ENCOUNTER SYMPTOMS
HEADACHES: 1
FEVER: 1
MYALGIAS: 1
RHINORRHEA: 0
SORE THROAT: 1
SINUS PAIN: 0
SHORTNESS OF BREATH: 0
COUGH: 0

## 2019-03-21 NOTE — ED PROVIDER NOTES
History     Chief Complaint   Patient presents with     Flu Symptoms     body aches, sore throat, fever, cough for 2 days     HPI  Jose Alberto Lu is a 43 year old male who presents to urgent care for evaluation of sore throat.  Patient states that over the past 2 days he has had sore throat, headache, fever, myalgias.  Patient denies any cough, congestion, shortness of breath, nausea, vomiting, diarrhea, abdominal pain, or any other associated symptoms at this time.    Allergies:  No Known Allergies    Problem List:    Patient Active Problem List    Diagnosis Date Noted     Suicidal ideation 08/26/2018     Priority: Medium     Polysubstance abuse (H) 09/28/2011     Priority: Medium     MARY (generalized anxiety disorder) 07/16/2009     Priority: Medium     Overview:   IMO Update 10/11       GERD (gastroesophageal reflux disease) 07/16/2009     Priority: Medium     Hypothyroidism 07/16/2009     Priority: Medium     Schizoaffective disorder (H) 07/16/2009     Priority: Medium     Dysthymic disorder 05/13/2008     Priority: Medium        Past Medical History:    Past Medical History:   Diagnosis Date     Anxiety      Depression      Schizoaffective disorder (H)      TBI (traumatic brain injury) (H) 1991     TMJ (dislocation of temporomandibular joint)        Past Surgical History:    Past Surgical History:   Procedure Laterality Date     BACK SURGERY  2009 or 2010     BRAIN SURGERY  1991    Reports part of skull removed      HAND SURGERY Right 1996     HERNIA REPAIR  2009 or 2010       Family History:    Family History   Family history unknown: Yes       Social History:  Marital Status:  Single [1]  Social History     Tobacco Use     Smoking status: Current Every Day Smoker     Packs/day: 0.50     Years: 20.00     Pack years: 10.00     Types: Cigarettes     Start date: 1/1/1993     Smokeless tobacco: Never Used   Substance Use Topics     Alcohol use: No     Drug use: Yes     Frequency: 7.0 times per week     Types:  Marijuana        Medications:      acetaminophen (TYLENOL) 500 MG tablet   penicillin V (VEETID) 500 MG tablet         Review of Systems   Constitutional: Positive for fever.   HENT: Positive for sore throat. Negative for congestion, rhinorrhea and sinus pain.    Respiratory: Negative for cough and shortness of breath.    Musculoskeletal: Positive for myalgias.   Neurological: Positive for headaches.       Physical Exam   BP: 119/81  Pulse: 93  Temp: 99.2  F (37.3  C)  Resp: 16  SpO2: 96 %      Physical Exam   Constitutional: He is oriented to person, place, and time. He appears well-developed and well-nourished.   HENT:   Head: Normocephalic.   Right Ear: Tympanic membrane normal.   Left Ear: Tympanic membrane normal.   Mouth/Throat: Uvula is midline and mucous membranes are normal. Posterior oropharyngeal erythema present. No oropharyngeal exudate or posterior oropharyngeal edema.   Eyes: Conjunctivae are normal. Pupils are equal, round, and reactive to light.   Cardiovascular: Normal rate, regular rhythm and normal heart sounds.   Pulmonary/Chest: Effort normal and breath sounds normal. No respiratory distress.   Neurological: He is alert and oriented to person, place, and time.   Nursing note and vitals reviewed.      ED Course        Procedures              Critical Care time:               Results for orders placed or performed during the hospital encounter of 03/20/19 (from the past 24 hour(s))   Rapid strep screen   Result Value Ref Range    Specimen Description Throat     Rapid Strep A Screen (A)      POSITIVE: Group A Streptococcal antigen detected by immunoassay.   Influenza A and B and RSV PCR   Result Value Ref Range    Specimen Description Nasal     Influenza A PCR Negative NEG^Negative    Influenza B PCR Negative NEG^Negative    Resp Syncytial Virus Negative NEG^Negative       Medications - No data to display    Assessments & Plan (with Medical Decision Making)     I have reviewed the nursing  notes.    I have reviewed the findings, diagnosis, plan and need for follow up with the patient.  #1.  Strep pharyngitis    Discussed exam findings with patient as well as positive rapid strep.  Patient is prescribed penicillin V 500 mg twice daily for 10 days.  Tylenol or ibuprofen as directed for pain or fever.  Supportive cares were discussed at length with patient.  If symptoms have not improved over the next 3-5 days please return to clinic or follow-up with primary care provider.  All questions are asked and answered.  Patient verbalizes understanding and plan.     Medication List      Started    penicillin V 500 MG tablet  Commonly known as:  VEETID  500 mg, Oral, 2 TIMES DAILY            Final diagnoses:   Streptococcal pharyngitis       3/20/2019   [facility]     Nestor Serrato PA-C  03/21/19 7315

## 2019-03-28 ENCOUNTER — APPOINTMENT (OUTPATIENT)
Dept: CT IMAGING | Facility: HOSPITAL | Age: 44
End: 2019-03-28
Attending: EMERGENCY MEDICINE
Payer: COMMERCIAL

## 2019-03-28 ENCOUNTER — HOSPITAL ENCOUNTER (EMERGENCY)
Facility: HOSPITAL | Age: 44
Discharge: HOME | End: 2019-03-29
Attending: EMERGENCY MEDICINE | Admitting: EMERGENCY MEDICINE
Payer: COMMERCIAL

## 2019-03-28 DIAGNOSIS — N20.1 URETERAL STONE: ICD-10-CM

## 2019-03-28 DIAGNOSIS — R10.9 ACUTE RIGHT FLANK PAIN: Primary | ICD-10-CM

## 2019-03-28 LAB
ALBUMIN SERPL-MCNC: 4.5 G/DL (ref 3.4–5)
ALP SERPL-CCNC: 95 U/L (ref 40–150)
ALT SERPL W P-5'-P-CCNC: 30 U/L (ref 0–70)
ANION GAP SERPL CALCULATED.3IONS-SCNC: 10 MMOL/L (ref 3–14)
AST SERPL W P-5'-P-CCNC: 16 U/L (ref 0–45)
BASOPHILS # BLD AUTO: 0 10E9/L (ref 0–0.2)
BASOPHILS NFR BLD AUTO: 0.2 %
BILIRUB SERPL-MCNC: 0.6 MG/DL (ref 0.2–1.3)
BUN SERPL-MCNC: 25 MG/DL (ref 7–30)
CALCIUM SERPL-MCNC: 9.9 MG/DL (ref 8.5–10.1)
CHLORIDE SERPL-SCNC: 103 MMOL/L (ref 94–109)
CO2 SERPL-SCNC: 25 MMOL/L (ref 20–32)
CREAT SERPL-MCNC: 1.2 MG/DL (ref 0.66–1.25)
DIFFERENTIAL METHOD BLD: ABNORMAL
EOSINOPHIL # BLD AUTO: 0 10E9/L (ref 0–0.7)
EOSINOPHIL NFR BLD AUTO: 0 %
ERYTHROCYTE [DISTWIDTH] IN BLOOD BY AUTOMATED COUNT: 12.2 % (ref 10–15)
GFR SERPL CREATININE-BSD FRML MDRD: 73 ML/MIN/{1.73_M2}
GLUCOSE SERPL-MCNC: 137 MG/DL (ref 70–99)
HCT VFR BLD AUTO: 43.8 % (ref 40–53)
HGB BLD-MCNC: 15.3 G/DL (ref 13.3–17.7)
IMM GRANULOCYTES # BLD: 0.1 10E9/L (ref 0–0.4)
IMM GRANULOCYTES NFR BLD: 0.6 %
LACTATE BLD-SCNC: 1.7 MMOL/L (ref 0.7–2)
LIPASE SERPL-CCNC: 211 U/L (ref 73–393)
LYMPHOCYTES # BLD AUTO: 1.8 10E9/L (ref 0.8–5.3)
LYMPHOCYTES NFR BLD AUTO: 15.1 %
MCH RBC QN AUTO: 31.2 PG (ref 26.5–33)
MCHC RBC AUTO-ENTMCNC: 34.9 G/DL (ref 31.5–36.5)
MCV RBC AUTO: 89 FL (ref 78–100)
MONOCYTES # BLD AUTO: 0.5 10E9/L (ref 0–1.3)
MONOCYTES NFR BLD AUTO: 3.8 %
NEUTROPHILS # BLD AUTO: 9.6 10E9/L (ref 1.6–8.3)
NEUTROPHILS NFR BLD AUTO: 80.3 %
NRBC # BLD AUTO: 0 10*3/UL
NRBC BLD AUTO-RTO: 0 /100
PLATELET # BLD AUTO: 435 10E9/L (ref 150–450)
POTASSIUM SERPL-SCNC: 3.9 MMOL/L (ref 3.4–5.3)
PROT SERPL-MCNC: 8.8 G/DL (ref 6.8–8.8)
RBC # BLD AUTO: 4.91 10E12/L (ref 4.4–5.9)
SODIUM SERPL-SCNC: 138 MMOL/L (ref 133–144)
WBC # BLD AUTO: 11.9 10E9/L (ref 4–11)

## 2019-03-28 PROCEDURE — 83690 ASSAY OF LIPASE: CPT | Performed by: EMERGENCY MEDICINE

## 2019-03-28 PROCEDURE — 96375 TX/PRO/DX INJ NEW DRUG ADDON: CPT

## 2019-03-28 PROCEDURE — 85025 COMPLETE CBC W/AUTO DIFF WBC: CPT | Performed by: EMERGENCY MEDICINE

## 2019-03-28 PROCEDURE — 96374 THER/PROPH/DIAG INJ IV PUSH: CPT

## 2019-03-28 PROCEDURE — 36415 COLL VENOUS BLD VENIPUNCTURE: CPT | Performed by: EMERGENCY MEDICINE

## 2019-03-28 PROCEDURE — 99285 EMERGENCY DEPT VISIT HI MDM: CPT | Mod: Z6 | Performed by: EMERGENCY MEDICINE

## 2019-03-28 PROCEDURE — 80053 COMPREHEN METABOLIC PANEL: CPT | Performed by: EMERGENCY MEDICINE

## 2019-03-28 PROCEDURE — 96361 HYDRATE IV INFUSION ADD-ON: CPT

## 2019-03-28 PROCEDURE — 74176 CT ABD & PELVIS W/O CONTRAST: CPT | Mod: TC

## 2019-03-28 PROCEDURE — 25000128 H RX IP 250 OP 636: Performed by: EMERGENCY MEDICINE

## 2019-03-28 PROCEDURE — 83605 ASSAY OF LACTIC ACID: CPT | Performed by: EMERGENCY MEDICINE

## 2019-03-28 PROCEDURE — 25000128 H RX IP 250 OP 636: Performed by: INTERNAL MEDICINE

## 2019-03-28 PROCEDURE — 99285 EMERGENCY DEPT VISIT HI MDM: CPT | Mod: 25

## 2019-03-28 RX ORDER — OXYCODONE HYDROCHLORIDE 5 MG/1
5 TABLET ORAL EVERY 6 HOURS PRN
Qty: 6 TABLET | Refills: 0 | Status: SHIPPED | OUTPATIENT
Start: 2019-03-28 | End: 2019-09-09

## 2019-03-28 RX ORDER — ONDANSETRON 2 MG/ML
4 INJECTION INTRAMUSCULAR; INTRAVENOUS EVERY 30 MIN PRN
Status: DISCONTINUED | OUTPATIENT
Start: 2019-03-28 | End: 2019-03-29 | Stop reason: HOSPADM

## 2019-03-28 RX ORDER — HYDROMORPHONE HYDROCHLORIDE 1 MG/ML
0.5 INJECTION, SOLUTION INTRAMUSCULAR; INTRAVENOUS; SUBCUTANEOUS ONCE
Status: COMPLETED | OUTPATIENT
Start: 2019-03-28 | End: 2019-03-28

## 2019-03-28 RX ORDER — OXYCODONE HYDROCHLORIDE 5 MG/1
5 TABLET ORAL EVERY 6 HOURS PRN
Qty: 6 TABLET | Refills: 0 | Status: SHIPPED | OUTPATIENT
Start: 2019-03-28 | End: 2019-03-28

## 2019-03-28 RX ORDER — ONDANSETRON 4 MG/1
4 TABLET, ORALLY DISINTEGRATING ORAL EVERY 8 HOURS PRN
Qty: 10 TABLET | Refills: 0 | Status: SHIPPED | OUTPATIENT
Start: 2019-03-28 | End: 2019-09-09

## 2019-03-28 RX ORDER — TAMSULOSIN HYDROCHLORIDE 0.4 MG/1
0.4 CAPSULE ORAL DAILY
Qty: 10 CAPSULE | Refills: 0 | Status: SHIPPED | OUTPATIENT
Start: 2019-03-28 | End: 2019-09-09

## 2019-03-28 RX ORDER — KETOROLAC TROMETHAMINE 30 MG/ML
30 INJECTION, SOLUTION INTRAMUSCULAR; INTRAVENOUS ONCE
Status: COMPLETED | OUTPATIENT
Start: 2019-03-28 | End: 2019-03-28

## 2019-03-28 RX ORDER — SODIUM CHLORIDE 9 MG/ML
1000 INJECTION, SOLUTION INTRAVENOUS CONTINUOUS
Status: DISCONTINUED | OUTPATIENT
Start: 2019-03-28 | End: 2019-03-29 | Stop reason: HOSPADM

## 2019-03-28 RX ADMIN — Medication 0.5 MG: at 23:48

## 2019-03-28 RX ADMIN — ONDANSETRON 4 MG: 2 INJECTION INTRAMUSCULAR; INTRAVENOUS at 21:57

## 2019-03-28 RX ADMIN — KETOROLAC TROMETHAMINE 30 MG: 30 INJECTION, SOLUTION INTRAMUSCULAR at 21:57

## 2019-03-28 RX ADMIN — SODIUM CHLORIDE 1000 ML: 9 INJECTION, SOLUTION INTRAVENOUS at 21:57

## 2019-03-28 ASSESSMENT — ENCOUNTER SYMPTOMS
FEVER: 0
SHORTNESS OF BREATH: 0
ABDOMINAL PAIN: 1

## 2019-03-28 NOTE — ED AVS SNAPSHOT
[facility]  [address] [place] [id number]  Phone:  [phone]                                    Jose Alberto Lu   MRN: [id number]    Department:  [facility]   Date of Visit:  3/20/2019           After Visit Summary Signature Page    I have received my discharge instructions, and my questions have been answered. I have discussed any challenges I see with this plan with the nurse or doctor.    ..........................................................................................................................................  Patient/Patient Representative Signature      ..........................................................................................................................................  Patient Representative Print Name and Relationship to Patient    ..................................................               ................................................  Date                                   Time    ..........................................................................................................................................  Reviewed by Signature/Title    ...................................................              ..............................................  Date                                               Time          22EPIC Rev 08/18        fair balance

## 2019-03-28 NOTE — ED AVS SNAPSHOT
[facility]  [address] [place] [address]  Phone:  [phone]                                    Jose Alberto Lu   MRN: [id number]    Department:  [facility]   Date of Visit:  3/28/2019           After Visit Summary Signature Page    I have received my discharge instructions, and my questions have been answered. I have discussed any challenges I see with this plan with the nurse or doctor.    ..........................................................................................................................................  Patient/Patient Representative Signature      ..........................................................................................................................................  Patient Representative Print Name and Relationship to Patient    ..................................................               ................................................  Date                                   Time    ..........................................................................................................................................  Reviewed by Signature/Title    ...................................................              ..............................................  Date                                               Time          22EPIC Rev 08/18

## 2019-03-29 VITALS
TEMPERATURE: 96.7 F | DIASTOLIC BLOOD PRESSURE: 86 MMHG | OXYGEN SATURATION: 99 % | SYSTOLIC BLOOD PRESSURE: 138 MMHG | RESPIRATION RATE: 18 BRPM | HEART RATE: 96 BPM

## 2019-03-29 NOTE — ED NOTES
Patient states that around 1600 he started having right flank pain and then nausea and vomiting started around 1800.  He denies any injury. Denies constipation or diarrhea. States he vomited x 3 prior to coming here. Denies urinary symptoms or history of kidney stones.
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
## 2019-05-21 ENCOUNTER — TELEPHONE (OUTPATIENT)
Dept: UROLOGY | Facility: OTHER | Age: 44
End: 2019-05-21

## 2019-05-21 NOTE — TELEPHONE ENCOUNTER
Pt seen in  ED 3/28/19     Acute right flank pain   Ureteral stone   Follow up with Lv Spain MD (Urology) in 1 week (4/4/2019)

## 2019-05-21 NOTE — TELEPHONE ENCOUNTER
Voicemail messages were left, for patient to call back to schedule, on: Marianna called this pt on 4-15 and 4-22-19. I called 5-15-19. No reply as of today, 5-21-19.
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
## 2019-09-09 ENCOUNTER — OFFICE VISIT (OUTPATIENT)
Dept: FAMILY MEDICINE | Facility: OTHER | Age: 44
End: 2019-09-09
Attending: NURSE PRACTITIONER
Payer: COMMERCIAL

## 2019-09-09 VITALS
DIASTOLIC BLOOD PRESSURE: 70 MMHG | OXYGEN SATURATION: 96 % | BODY MASS INDEX: 22.89 KG/M2 | HEART RATE: 63 BPM | SYSTOLIC BLOOD PRESSURE: 118 MMHG | TEMPERATURE: 97 F | WEIGHT: 155 LBS

## 2019-09-09 DIAGNOSIS — Z71.6 TOBACCO ABUSE COUNSELING: ICD-10-CM

## 2019-09-09 DIAGNOSIS — Z72.0 TOBACCO ABUSE: ICD-10-CM

## 2019-09-09 DIAGNOSIS — G89.29 CHRONIC LOW BACK PAIN, UNSPECIFIED BACK PAIN LATERALITY, WITH SCIATICA PRESENCE UNSPECIFIED: Primary | ICD-10-CM

## 2019-09-09 DIAGNOSIS — F25.0 SCHIZOAFFECTIVE DISORDER, BIPOLAR TYPE (H): ICD-10-CM

## 2019-09-09 DIAGNOSIS — M54.5 CHRONIC LOW BACK PAIN, UNSPECIFIED BACK PAIN LATERALITY, WITH SCIATICA PRESENCE UNSPECIFIED: Primary | ICD-10-CM

## 2019-09-09 PROCEDURE — 99213 OFFICE O/P EST LOW 20 MIN: CPT | Performed by: NURSE PRACTITIONER

## 2019-09-09 PROCEDURE — G0463 HOSPITAL OUTPT CLINIC VISIT: HCPCS | Mod: 25

## 2019-09-09 PROCEDURE — G0463 HOSPITAL OUTPT CLINIC VISIT: HCPCS

## 2019-09-09 ASSESSMENT — PAIN SCALES - GENERAL: PAINLEVEL: SEVERE PAIN (6)

## 2019-09-09 NOTE — PATIENT INSTRUCTIONS

## 2019-09-09 NOTE — NURSING NOTE
"Chief Complaint   Patient presents with     Back Pain       Initial /70   Pulse 63   Temp 97  F (36.1  C)   Wt 70.3 kg (155 lb)   SpO2 96%   BMI 22.89 kg/m   Estimated body mass index is 22.89 kg/m  as calculated from the following:    Height as of 8/26/18: 1.753 m (5' 9\").    Weight as of this encounter: 70.3 kg (155 lb).  Medication Reconciliation: complete  "

## 2019-09-27 ENCOUNTER — TELEPHONE (OUTPATIENT)
Dept: BEHAVIORAL HEALTH | Facility: OTHER | Age: 44
End: 2019-09-27

## 2019-09-27 NOTE — TELEPHONE ENCOUNTER
Received referral for possible [facility] services from patient's PCP.  Attempted to reach patient, but was unsuccessful.  Plan to attempt again.       SANJAY Harris  Social Work Care Coordinator  [facility]   [phone] or [phone]

## 2019-10-03 NOTE — PROGRESS NOTES
Subjective     Jose Alberto Lu is a 44 year old male who presents to clinic today for the following health issues:    HPI   Depression and Anxiety Follow-Up    How are you doing with your depression since your last visit? No change    How are you doing with your anxiety since your last visit?  No change    Are you having other symptoms that might be associated with depression or anxiety? No    Have you had a significant life event? No     Do you have any concerns with your use of alcohol or other drugs? No     Denies SI    [facility] he is doing counseling. At this time they are not doing medication management.  He is supposed to go to [facility] for an evaluation, but has not been set up as far as he know (maybe in November). He is waiting to see a psychiatrist.  He did miss an appointment with [facility] he plans to make an appointment today     Social History     Tobacco Use     Smoking status: Current Every Day Smoker     Packs/day: 0.50     Years: 20.00     Pack years: 10.00     Types: Cigarettes     Start date: 1/1/1993     Smokeless tobacco: Never Used   Substance Use Topics     Alcohol use: No     Drug use: Yes     Frequency: 7.0 times per week     Types: Marijuana     PHQ 3/12/2018 3/28/2018 8/6/2018   PHQ-9 Total Score 7 7 12   Q9: Thoughts of better off dead/self-harm past 2 weeks Not at all Not at all Not at all     MARY-7 SCORE 3/12/2018 3/28/2018 8/6/2018   Total Score 4 6 10           Suicide Assessment Five-step Evaluation and Treatment (SAFE-T)  Chronic Pain Follow-Up       Type / Location of Pain: back and abdomen   Analgesia/pain control:       Recent changes:  same      Overall control: Inadequate pain control  Activity level/function:      Daily activities:  Able to do all daily activities    Work:  Able to work part time without limitations  Adverse effects:  No  Adherance    Taking medication as directed?  Yes    Participating in other treatments: not  applicable  Risk Factors:    Sleep:  Fair    Mood/anxiety:  controlled    Recent family or social stressors:  none noted    Other aggravating factors: prolonged sitting   He states he tries to stretch has some relief. Walking does help, limited amount of tylenol   PHQ-9 SCORE 3/12/2018 3/28/2018 8/6/2018   PHQ-9 Total Score 7 7 12     MARY-7 SCORE 3/12/2018 3/28/2018 8/6/2018   Total Score 4 6 10     Encounter-Level CSA:    There are no encounter-level csa.     Patient-Level CSA:    There are no patient-level csa.           How many servings of fruits and vegetables do you eat daily?  4 or more    On average, how many sweetened beverages do you drink each day (soda, juice, sweet tea, etc)?   1    How many days per week do you miss taking your medication? 0        Patient Active Problem List   Diagnosis     Dysthymic disorder     MARY (generalized anxiety disorder)     GERD (gastroesophageal reflux disease)     Hypothyroidism     Polysubstance abuse (H)     Schizoaffective disorder (H)     Suicidal ideation     Past Surgical History:   Procedure Laterality Date     BACK SURGERY  2009 or 2010     BRAIN SURGERY  1991    Reports part of skull removed      HAND SURGERY Right 1996     HERNIA REPAIR  2009 or 2010       Social History     Tobacco Use     Smoking status: Current Every Day Smoker     Packs/day: 0.50     Years: 20.00     Pack years: 10.00     Types: Cigarettes     Start date: 1/1/1993     Smokeless tobacco: Never Used   Substance Use Topics     Alcohol use: No     Family History   Family history unknown: Yes         Current Outpatient Medications   Medication Sig Dispense Refill     acetaminophen (TYLENOL) 500 MG tablet Take 2 tablets (1,000 mg) by mouth every 6 hours as needed for mild pain 100 tablet 0     No Known Allergies  BP Readings from Last 3 Encounters:   10/09/19 110/60   09/09/19 118/70   03/29/19 138/86    Wt Readings from Last 3 Encounters:   10/09/19 71.7 kg (158 lb)   09/09/19 70.3 kg (155 lb)    08/26/18 65.5 kg (144 lb 6.4 oz)                 Reviewed and updated as needed this visit by Provider         Review of Systems   ROS COMP: CONSTITUTIONAL:occasional sweating   INTEGUMENTARY/SKIN: rash on face peeling skin possible from shaving   ENT/MOUTH: left upper jaw bad tooth (needs a dental appointment)  RESP:NEGATIVE for significant cough or SOB  CV: NEGATIVE for chest pain, palpitations or peripheral edema  MUSCULOSKELETAL: chronic intermittent back pain and pain over scar site from hernia surgery  NEURO: NEGATIVE for weakness, dizziness or paresthesias  PSYCHIATRIC: HX anxiety and HX depression      Objective    /60 (BP Location: Left arm, Patient Position: Chair, Cuff Size: Adult Regular)   Pulse 82   Temp 98.1  F (36.7  C) (Tympanic)   Wt 71.7 kg (158 lb)   SpO2 96%   BMI 23.33 kg/m    Body mass index is 23.33 kg/m .  Physical Exam   GENERAL: alert and no distress  HENT: normal cephalic/atraumatic, ear canals and TM's normal, nose and mouth without ulcers or lesions, oropharynx clear, oral mucous membranes moist and broken tooth/cavitiy hole in tooth left upper #15, negative for redness, swelling or drainage  NECK: no adenopathy, no asymmetry, masses, or scars and thyroid normal to palpation  RESP: lungs clear to auscultation - no rales, rhonchi or wheezes  CV: regular rate and rhythm, normal S1 S2, no S3 or S4, no murmur, click or rub, no peripheral edema and peripheral pulses strong  ABDOMEN: soft, nontender, no hepatosplenomegaly, no masses and bowel sounds normal  SKIN: abrasion to upper lip  PSYCH: mentation appears normal and affect flat    Diagnostic Test Results:  Labs reviewed in Epic  Results for orders placed or performed during the hospital encounter of 03/28/19   CT Abdomen Pelvis w/o Contrast    Narrative    EXAMINATION: CT ABDOMEN PELVIS W/O CONTRAST, 3/28/2019 10:30 PM    TECHNIQUE:  Helical CT images from the lung bases through the  symphysis pubis were obtained  without IV  contrast. Contrast dose:    COMPARISON: none    HISTORY: Flank pain, stone disease suspected    FINDINGS:    There is dependent atelectasis at the lung bases.    The liver is free of masses or biliary ductal enlargement. No  calcified gallstones are seen.    The the spleen and pancreas appear normal.    The adrenal glands are normal.    There is dilation of the right renal collecting system and right  ureter. In the pelvis there is a 3 mm right ureteric calculus at the  mid pelvic level. No intrarenal calculi are seen. There is no renal  masses.    The periaortic lymph nodes are normal in caliber.    No intraperitoneal masses or inflammatory changes are noted. The  appendix appears normal.    In the pelvis the bladder and rectum appear normal.    Degenerative changes are present at L5-S1      Impression    IMPRESSION: 3 mm right ureteric calculus at the mid pelvic level. Mild  right-sided hydronephrosis.     KIKO MENJIVAR MD   CBC with platelets differential   Result Value Ref Range    WBC 11.9 (H) 4.0 - 11.0 10e9/L    RBC Count 4.91 4.4 - 5.9 10e12/L    Hemoglobin 15.3 13.3 - 17.7 g/dL    Hematocrit 43.8 40.0 - 53.0 %    MCV 89 78 - 100 fl    MCH 31.2 26.5 - 33.0 pg    MCHC 34.9 31.5 - 36.5 g/dL    RDW 12.2 10.0 - 15.0 %    Platelet Count 435 150 - 450 10e9/L    Diff Method Automated Method     % Neutrophils 80.3 %    % Lymphocytes 15.1 %    % Monocytes 3.8 %    % Eosinophils 0.0 %    % Basophils 0.2 %    % Immature Granulocytes 0.6 %    Nucleated RBCs 0 0 /100    Absolute Neutrophil 9.6 (H) 1.6 - 8.3 10e9/L    Absolute Lymphocytes 1.8 0.8 - 5.3 10e9/L    Absolute Monocytes 0.5 0.0 - 1.3 10e9/L    Absolute Eosinophils 0.0 0.0 - 0.7 10e9/L    Absolute Basophils 0.0 0.0 - 0.2 10e9/L    Abs Immature Granulocytes 0.1 0 - 0.4 10e9/L    Absolute Nucleated RBC 0.0    Comprehensive metabolic panel   Result Value Ref Range    Sodium 138 133 - 144 mmol/L    Potassium 3.9 3.4 - 5.3 mmol/L    Chloride 103 94 - 109 mmol/L     Carbon Dioxide 25 20 - 32 mmol/L    Anion Gap 10 3 - 14 mmol/L    Glucose 137 (H) 70 - 99 mg/dL    Urea Nitrogen 25 7 - 30 mg/dL    Creatinine 1.20 0.66 - 1.25 mg/dL    GFR Estimate 73 >60 mL/min/[1.73_m2]    GFR Estimate If Black 85 >60 mL/min/[1.73_m2]    Calcium 9.9 8.5 - 10.1 mg/dL    Bilirubin Total 0.6 0.2 - 1.3 mg/dL    Albumin 4.5 3.4 - 5.0 g/dL    Protein Total 8.8 6.8 - 8.8 g/dL    Alkaline Phosphatase 95 40 - 150 U/L    ALT 30 0 - 70 U/L    AST 16 0 - 45 U/L   Lipase   Result Value Ref Range    Lipase 211 73 - 393 U/L   Lactic acid whole blood   Result Value Ref Range    Lactic Acid 1.7 0.7 - 2.0 mmol/L           Assessment & Plan     1. Schizoaffective disorder, bipolar type (H)  Continue to follow up with [facility]    2. Chronic bilateral low back pain without sciatica  Start physical therapy    3. Pain, dental  Make an appointment with the dentist. Tooth does not appear to be infected. Discussed symptomatic treatment.    Jose Alberto has poor follow up care with mental health, physical therapy and dentist. He states he will be calling today to make appointments.        Tobacco Cessation:   reports that he has been smoking cigarettes. He started smoking about 26 years ago. He has a 10.00 pack-year smoking history. He has never used smokeless tobacco.  Tobacco cessation was strongly encouraged. Every year of smoking cessation offer health benefits.     He should follow up with any questions or concerns      See Patient Instructions    Return in about 3 months (around 1/9/2020) for mental health and chronic pain .    MARIO ALBERTO [facility] - [place]

## 2019-10-09 ENCOUNTER — OFFICE VISIT (OUTPATIENT)
Dept: FAMILY MEDICINE | Facility: OTHER | Age: 44
End: 2019-10-09
Attending: NURSE PRACTITIONER
Payer: COMMERCIAL

## 2019-10-09 VITALS
HEART RATE: 82 BPM | TEMPERATURE: 98.1 F | WEIGHT: 158 LBS | OXYGEN SATURATION: 96 % | BODY MASS INDEX: 23.33 KG/M2 | SYSTOLIC BLOOD PRESSURE: 110 MMHG | DIASTOLIC BLOOD PRESSURE: 60 MMHG

## 2019-10-09 DIAGNOSIS — M54.50 CHRONIC BILATERAL LOW BACK PAIN WITHOUT SCIATICA: ICD-10-CM

## 2019-10-09 DIAGNOSIS — F25.0 SCHIZOAFFECTIVE DISORDER, BIPOLAR TYPE (H): Primary | ICD-10-CM

## 2019-10-09 DIAGNOSIS — G89.29 CHRONIC BILATERAL LOW BACK PAIN WITHOUT SCIATICA: ICD-10-CM

## 2019-10-09 DIAGNOSIS — K08.89 PAIN, DENTAL: ICD-10-CM

## 2019-10-09 PROCEDURE — G0463 HOSPITAL OUTPT CLINIC VISIT: HCPCS

## 2019-10-09 PROCEDURE — 99213 OFFICE O/P EST LOW 20 MIN: CPT | Performed by: NURSE PRACTITIONER

## 2019-10-09 ASSESSMENT — PAIN SCALES - GENERAL: PAINLEVEL: SEVERE PAIN (7)

## 2019-10-09 NOTE — NURSING NOTE
"Chief Complaint   Patient presents with     chronic pain     Mental Health Problem       Initial /60 (BP Location: Left arm, Patient Position: Chair, Cuff Size: Adult Regular)   Pulse 82   Temp 98.1  F (36.7  C) (Tympanic)   Wt 71.7 kg (158 lb)   SpO2 96%   BMI 23.33 kg/m   Estimated body mass index is 23.33 kg/m  as calculated from the following:    Height as of 8/26/18: 1.753 m (5' 9\").    Weight as of this encounter: 71.7 kg (158 lb).  Medication Reconciliation: complete  Mark Shine LPN  "

## 2019-10-09 NOTE — PATIENT INSTRUCTIONS
Patient Education     Chronic Pain  Pain serves an important role. It lets you know something is wrong that needs your attention. When the body heals, pain normally goes away.  When pain lasts longer than 6 months, it is called  chronic  pain. This is pain that is present even after the body has healed. Chronic pain can cause mood problems and get in the way of your relationships and your daily life.  A number of conditions can cause chronic pain. Some of the more common include:    Previous surgery    An old injury    Infection    Diseases such as diabetes    Nerve damage    Back injury    Arthritis    Migraine or other headaches    Fibromyalgia    Cancer  Depression and stress can make chronic pain symptoms worse. In some cases, a cause for the pain can't be found.   Treatment  Treatment can greatly reduce pain. In many cases, pain can become less severe, occur less often, and interfere less with your daily life. Chronic pain is often treated with a combination of medicines, therapies, and lifestyle changes. You will work closely with your healthcare provider to find a treatment plan that works best for you.    Ask your healthcare provider for a referral to a pain management specialty center. These can provide the most recent and proven pain management strategies, along with emotional support and comprehensive services.    Several different types of medicines may be prescribed for chronic pain. Work with your healthcare provider to develop a medicine plan that helps manage your pain.    Physical therapy can help reduce certain types of chronic pain.    Occupational therapy teaches you how to do routine tasks of daily living in ways that lessen your discomfort.    Counseling can help you cope better with stress and pain.    Other therapies such as meditation, yoga, biofeedback, massage, and acupuncture can also help manage chronic pain.    Changing certain habits can help reduce chronic pain. They  include:  ? Eating healthy  ? Developing an exercise routine  ? Getting enough sleep   ? Stopping smoking and limiting alcohol use  ? Losing excess weight  Follow-up care  Follow up with your healthcare provider, or as advised. Let your healthcare provider know if your current treatment plan is working or if changes are needed.  Resources  For more information, contact:    American Headache and Migraine Association, [email or web address] or 874-844-3818    American Chronic Pain Association, theacpa.org or 532-990-0889  Date Last Reviewed: 8/1/2017 2000-2018 [employer]. [address] [place] [address]. All rights reserved. This information is not intended as a substitute for professional medical care. Always follow your healthcare professional's instructions.

## 2020-01-06 ENCOUNTER — HOSPITAL ENCOUNTER (EMERGENCY)
Facility: HOSPITAL | Age: 45
Discharge: HOME | End: 2020-01-06
Attending: NURSE PRACTITIONER | Admitting: NURSE PRACTITIONER
Payer: MEDICAID

## 2020-01-06 VITALS
SYSTOLIC BLOOD PRESSURE: 129 MMHG | TEMPERATURE: 97 F | OXYGEN SATURATION: 98 % | DIASTOLIC BLOOD PRESSURE: 89 MMHG | RESPIRATION RATE: 16 BRPM

## 2020-01-06 DIAGNOSIS — G89.29 CHRONIC BILATERAL LOW BACK PAIN WITH RIGHT-SIDED SCIATICA: ICD-10-CM

## 2020-01-06 DIAGNOSIS — M54.41 CHRONIC BILATERAL LOW BACK PAIN WITH RIGHT-SIDED SCIATICA: ICD-10-CM

## 2020-01-06 PROCEDURE — 96372 THER/PROPH/DIAG INJ SC/IM: CPT

## 2020-01-06 PROCEDURE — 25000128 H RX IP 250 OP 636: Performed by: NURSE PRACTITIONER

## 2020-01-06 PROCEDURE — G0463 HOSPITAL OUTPT CLINIC VISIT: HCPCS | Mod: 25

## 2020-01-06 PROCEDURE — 99213 OFFICE O/P EST LOW 20 MIN: CPT | Mod: Z6 | Performed by: NURSE PRACTITIONER

## 2020-01-06 RX ORDER — NAPROXEN 500 MG/1
500 TABLET ORAL 2 TIMES DAILY WITH MEALS
Qty: 24 TABLET | Refills: 0 | Status: SHIPPED | OUTPATIENT
Start: 2020-01-06 | End: 2020-01-13

## 2020-01-06 RX ORDER — KETOROLAC TROMETHAMINE 30 MG/ML
60 INJECTION, SOLUTION INTRAMUSCULAR; INTRAVENOUS ONCE
Status: COMPLETED | OUTPATIENT
Start: 2020-01-06 | End: 2020-01-06

## 2020-01-06 RX ADMIN — KETOROLAC TROMETHAMINE 60 MG: 30 INJECTION, SOLUTION INTRAMUSCULAR; INTRAVENOUS at 16:45

## 2020-01-06 ASSESSMENT — ENCOUNTER SYMPTOMS
EYES NEGATIVE: 1
HEMATOLOGIC/LYMPHATIC NEGATIVE: 1
GASTROINTESTINAL NEGATIVE: 1
PSYCHIATRIC NEGATIVE: 1
BACK PAIN: 1
NEUROLOGICAL NEGATIVE: 1
ENDOCRINE NEGATIVE: 1
RESPIRATORY NEGATIVE: 1
CONSTITUTIONAL NEGATIVE: 1
ALLERGIC/IMMUNOLOGIC NEGATIVE: 1

## 2020-01-06 NOTE — DISCHARGE INSTRUCTIONS
Daily light stretching to the pain not through the pain  Ice and or heat 3-4 times a day  Naproxen as directed  Follow up at the clinic for further evaluation   Return to the ER/UC if symptoms worsen

## 2020-01-06 NOTE — ED PROVIDER NOTES
History     Chief Complaint   Patient presents with     Back Pain     c/o chronic lower back pain     The history is provided by the patient.     Jose Alberto Lu is a 44 year old male who presents to the  for exacerbation of low back pain. He states he was lifting something and increased the pain in his back. He did have an MRI 3/2018, without follow up. Denies any problems with bowels or bladder. He states he is using marijuana to relax.     Jose Alberto states he was able to drive his snowmobile to the  with his back pain.     Allergies:  No Known Allergies    Problem List:    Patient Active Problem List    Diagnosis Date Noted     Suicidal ideation 08/26/2018     Priority: Medium     Polysubstance abuse (H) 09/28/2011     Priority: Medium     MARY (generalized anxiety disorder) 07/16/2009     Priority: Medium     Overview:   IMO Update 10/11       GERD (gastroesophageal reflux disease) 07/16/2009     Priority: Medium     Hypothyroidism 07/16/2009     Priority: Medium     Schizoaffective disorder (H) 07/16/2009     Priority: Medium     Dysthymic disorder 05/13/2008     Priority: Medium        Past Medical History:    Past Medical History:   Diagnosis Date     Anxiety      Depression      Schizoaffective disorder (H)      TBI (traumatic brain injury) (H) 1991     TMJ (dislocation of temporomandibular joint)        Past Surgical History:    Past Surgical History:   Procedure Laterality Date     BACK SURGERY  2009 or 2010     BRAIN SURGERY  1991    Reports part of skull removed      HAND SURGERY Right 1996     HERNIA REPAIR  2009 or 2010       Family History:    Family History   Family history unknown: Yes       Social History:  Marital Status:  Single [1]  Social History     Tobacco Use     Smoking status: Current Every Day Smoker     Packs/day: 0.50     Years: 20.00     Pack years: 10.00     Types: Cigarettes     Start date: 1/1/1993     Smokeless tobacco: Never Used   Substance Use Topics     Alcohol use: No      Drug use: Yes     Frequency: 7.0 times per week     Types: Marijuana        Medications:    naproxen (NAPROSYN) 500 MG tablet  acetaminophen (TYLENOL) 500 MG tablet          Review of Systems   Constitutional: Negative.    HENT: Negative.    Eyes: Negative.    Respiratory: Negative.    Gastrointestinal: Negative.    Endocrine: Negative.    Genitourinary: Negative.    Musculoskeletal: Positive for back pain.   Skin: Negative.    Allergic/Immunologic: Negative.    Neurological: Negative.    Hematological: Negative.    Psychiatric/Behavioral: Negative.        Physical Exam   BP: 129/89  Heart Rate: 87  Temp: 97  F (36.1  C)  Resp: 16  SpO2: 98 %      Physical Exam  Vitals signs and nursing note reviewed.   Cardiovascular:      Rate and Rhythm: Normal rate.   Pulmonary:      Effort: Pulmonary effort is normal.   Musculoskeletal:      Lumbar back: He exhibits decreased range of motion, tenderness and pain.      Comments: Back: No obvious ecchymosis, edema, or erythema  Spinal tenderness: lumbar spie  Muscle Tenderness: lower back  Forward Flexion: 75/90 degrees with pain  Back Flexion: 20/30 degrees with pain  Bilateral Pedal Pulses Intact  Sensation intact, capillary refill < 3 seconds bilateral lower extremities  Patellar reflexes hyper intact     Skin:     General: Skin is warm and dry.      Capillary Refill: Capillary refill takes less than 2 seconds.      Findings: No rash.   Neurological:      Mental Status: He is alert.         ED Course        Procedures               Critical Care time:  none               No results found for this or any previous visit (from the past 24 hour(s)).    Medications   ketorolac (TORADOL) injection 60 mg (60 mg Intramuscular Given 1/6/20 1645)       Assessments & Plan (with Medical Decision Making)     I have reviewed the nursing notes.    I have reviewed the findings, diagnosis, plan and need for follow up with the patient.      Patient verbally educated and given appropriate  education sheets for the diagnoses and has no questions.  Take medications as directed.   Follow up with your Primary Care provider if symptoms increase or if further concerns develop, return to the ER    Daily light stretching to the pain not through the pain  Ice and or heat 3-4 times a day  Naproxen as directed  Follow up at the clinic for further evaluation   Return to the ER/UC if symptoms worsen  Can consider repeat imaging (MRI), interventional radiology, physical therapy or chiropractor care    New Prescriptions    NAPROXEN (NAPROSYN) 500 MG TABLET    Take 1 tablet (500 mg) by mouth 2 times daily (with meals)       Final diagnoses:   Chronic bilateral low back pain with right-sided sciatica       1/6/2020   [facility]     Olivia Mckeon APRN CNP  01/06/20 3203

## 2020-01-06 NOTE — ED AVS SNAPSHOT
[facility]  [address] [place] [address]  [place] [address]  Phone:  [phone]                                    Jose Alberto Lu   MRN: [id number]    Department:  [facility]   Date of Visit:  1/6/2020           After Visit Summary Signature Page    I have received my discharge instructions, and my questions have been answered. I have discussed any challenges I see with this plan with the nurse or doctor.    ..........................................................................................................................................  Patient/Patient Representative Signature      ..........................................................................................................................................  Patient Representative Print Name and Relationship to Patient    ..................................................               ................................................  Date                                   Time    ..........................................................................................................................................  Reviewed by Signature/Title    ...................................................              ..............................................  Date                                               Time          22EPIC Rev 08/18

## 2020-01-13 ENCOUNTER — OFFICE VISIT (OUTPATIENT)
Dept: FAMILY MEDICINE | Facility: OTHER | Age: 45
End: 2020-01-13
Attending: NURSE PRACTITIONER
Payer: MEDICAID

## 2020-01-13 ENCOUNTER — OFFICE VISIT (OUTPATIENT)
Dept: BEHAVIORAL HEALTH | Facility: OTHER | Age: 45
End: 2020-01-13
Attending: SOCIAL WORKER

## 2020-01-13 VITALS
SYSTOLIC BLOOD PRESSURE: 92 MMHG | HEIGHT: 69 IN | OXYGEN SATURATION: 96 % | RESPIRATION RATE: 18 BRPM | WEIGHT: 145 LBS | BODY MASS INDEX: 21.48 KG/M2 | TEMPERATURE: 96.9 F | HEART RATE: 94 BPM | DIASTOLIC BLOOD PRESSURE: 64 MMHG

## 2020-01-13 DIAGNOSIS — R69 DIAGNOSIS DEFERRED: Primary | ICD-10-CM

## 2020-01-13 DIAGNOSIS — M54.41 CHRONIC MIDLINE LOW BACK PAIN WITH RIGHT-SIDED SCIATICA: ICD-10-CM

## 2020-01-13 DIAGNOSIS — G89.29 CHRONIC MIDLINE LOW BACK PAIN WITH RIGHT-SIDED SCIATICA: ICD-10-CM

## 2020-01-13 DIAGNOSIS — F25.0 SCHIZOAFFECTIVE DISORDER, BIPOLAR TYPE (H): Primary | ICD-10-CM

## 2020-01-13 PROCEDURE — G0463 HOSPITAL OUTPT CLINIC VISIT: HCPCS | Performed by: NURSE PRACTITIONER

## 2020-01-13 PROCEDURE — 99213 OFFICE O/P EST LOW 20 MIN: CPT | Performed by: NURSE PRACTITIONER

## 2020-01-13 ASSESSMENT — MIFFLIN-ST. JEOR: SCORE: 1530.16

## 2020-01-13 ASSESSMENT — ANXIETY QUESTIONNAIRES
2. NOT BEING ABLE TO STOP OR CONTROL WORRYING: SEVERAL DAYS
3. WORRYING TOO MUCH ABOUT DIFFERENT THINGS: SEVERAL DAYS
5. BEING SO RESTLESS THAT IT IS HARD TO SIT STILL: SEVERAL DAYS
GAD7 TOTAL SCORE: 9
IF YOU CHECKED OFF ANY PROBLEMS ON THIS QUESTIONNAIRE, HOW DIFFICULT HAVE THESE PROBLEMS MADE IT FOR YOU TO DO YOUR WORK, TAKE CARE OF THINGS AT HOME, OR GET ALONG WITH OTHER PEOPLE: SOMEWHAT DIFFICULT
1. FEELING NERVOUS, ANXIOUS, OR ON EDGE: MORE THAN HALF THE DAYS
7. FEELING AFRAID AS IF SOMETHING AWFUL MIGHT HAPPEN: SEVERAL DAYS
6. BECOMING EASILY ANNOYED OR IRRITABLE: SEVERAL DAYS

## 2020-01-13 ASSESSMENT — PATIENT HEALTH QUESTIONNAIRE - PHQ9
SUM OF ALL RESPONSES TO PHQ QUESTIONS 1-9: 9
5. POOR APPETITE OR OVEREATING: MORE THAN HALF THE DAYS

## 2020-01-13 ASSESSMENT — PAIN SCALES - GENERAL: PAINLEVEL: EXTREME PAIN (8)

## 2020-01-13 NOTE — PROGRESS NOTES
Subjective     Jose Alberto Lu is a 44 year old male who presents to clinic today for the following health issues:    HPI   ED/UC Followup:    Facility:  [facility]  Date of visit: 1/6/2020  Reason for visit: low back pain  Current Status: no change in back pain   States he would not take naproxen due to bad stomach  States he does do some stretching at times  He rode his bike to the clinic today  Jose Alberto is very upset that he is still in pain and wants to have an MRI done.   He refuses physical therapy, chiropractor care or anything else  He states he had a diskectomy previously He can feel bumps on his back and they hurt. He is very thin and bony structure felt        Depression and Anxiety Follow-Up    How are you doing with your depression since your last visit? Worsened     How are you doing with your anxiety since your last visit?  Worsened     Are you having other symptoms that might be associated with depression or anxiety? No    Have you had a significant life event? OTHER: patient is upset because he wants to get a rx for medical marijunia      Do you have any concerns with your use of alcohol or other drugs? No     Appointment with [facility] for counseling. Does not like it due to only talking     Medication management with no medication management    Jose Alberto is angry today. Upset that no one is following through with him.  States his voice mail is always full so no one can leave a message       Social History     Tobacco Use     Smoking status: Current Every Day Smoker     Packs/day: 0.50     Years: 20.00     Pack years: 10.00     Types: Cigarettes     Start date: 1/1/1993     Smokeless tobacco: Never Used   Substance Use Topics     Alcohol use: No     Drug use: Yes     Frequency: 7.0 times per week     Types: Marijuana     PHQ 3/12/2018 3/28/2018 8/6/2018   PHQ-9 Total Score 7 7 12   Q9: Thoughts of better off dead/self-harm past 2 weeks Not at all Not at all Not at all     MARY-7  "SCORE 3/12/2018 3/28/2018 8/6/2018   Total Score 4 6 10         Suicide Assessment Five-step Evaluation and Treatment (SAFE-T)      Patient Active Problem List   Diagnosis     Dysthymic disorder     MARY (generalized anxiety disorder)     GERD (gastroesophageal reflux disease)     Hypothyroidism     Polysubstance abuse (H)     Schizoaffective disorder (H)     Suicidal ideation     Past Surgical History:   Procedure Laterality Date     BACK SURGERY  2009 or 2010     BRAIN SURGERY  1991    Reports part of skull removed      HAND SURGERY Right 1996     HERNIA REPAIR  2009 or 2010       Social History     Tobacco Use     Smoking status: Current Every Day Smoker     Packs/day: 0.50     Years: 20.00     Pack years: 10.00     Types: Cigarettes     Start date: 1/1/1993     Smokeless tobacco: Never Used   Substance Use Topics     Alcohol use: No     Family History   Family history unknown: Yes         No current outpatient medications on file.     No Known Allergies  BP Readings from Last 3 Encounters:   01/13/20 92/64   01/06/20 129/89   10/09/19 110/60    Wt Readings from Last 3 Encounters:   01/13/20 65.8 kg (145 lb)   10/09/19 71.7 kg (158 lb)   09/09/19 70.3 kg (155 lb)                      Reviewed and updated as needed this visit by Provider         Review of Systems   ROS COMP: CONSTITUTIONAL: NEGATIVE for fever, chills, change in weight  INTEGUMENTARY/SKIN: NEGATIVE for worrisome rashes, moles or lesions  RESP:occasional cough  CV: NEGATIVE for chest pain, palpitations or peripheral edema  GI: abdominal pain generalized and constipation  : denies dysuria   MUSCULOSKELETAL: low back pain worse on the right side   NEURO: NEGATIVE for weakness, dizziness or paresthesias  PSYCHIATRIC: angry, HX anxiety and HX depression      Objective    BP 92/64   Pulse 94   Temp 96.9  F (36.1  C) (Tympanic)   Resp 18   Ht 1.74 m (5' 8.5\")   Wt 65.8 kg (145 lb)   SpO2 96%   BMI 21.73 kg/m    Body mass index is 21.73 " kg/m .  Physical Exam   GENERAL: alert and no distress  RESP: lungs clear to auscultation - no rales, rhonchi or wheezes  CV: regular rate and rhythm, normal S1 S2, no S3 or S4, no murmur, click or rub, no peripheral edema and peripheral pulses strong  ABDOMEN: tenderness generalized and bowel sounds normal  MUSCULOSKELETAL: low back pain, spinal tenderness low back, pain across lower back. Able to heel and toe walk. Limited ROM with bending due to pain  PSYCH: mentation appears normal, anxious and upset/aggitated     Diagnostic Test Results:  Labs reviewed in Epic        Assessment & Plan     1. Schizoaffective disorder, bipolar type (H)  Continue to work with [facility]  Jose Alberto was supposed to be seen by neuropsychology, per their note they were not able to get a hold of him.  Jose Alberto has missed other phone calls from [facility] in the past  He has poor follow up and does not answer or call to check on plans discussed.   Attempted to have [facility] meet with patient to assist as needed.       2. Chronic midline low back pain with right-sided sciatica  Discussed with Jose Alberto that he will need to answer his phone if he wants to have his MRI done. MRI placed per patient request. Referral for IR Consult to see what would be a good next  Plan of care.   He is able to ride a snowmobile and ride his pedal bike with his chronic back pain.   He believes he needs surgery. Waiting for IR consult.   Last MRI he did not follow up with afterwards.   - IR Consultation for IR Exam (Pain Consult); Future    Patient No Showed for his MRI on 1/22/2020         Jose Alberto is encouraged to take some responsibility for his mental and physical health.  He voice mail is full most of the time and he has difficulty clearing it out. He would not take information from [facility] who would be able to assist when he feels like people are not helping him.     See Patient Instructions    No follow-ups on file.    MARIO ALBERTO Dillard  CNP  [facility]

## 2020-01-13 NOTE — PROGRESS NOTES
CHW contacted patient to introduce self and offer [facility].Spoke with patient to discuss [facility] services in more detail. Patient declined services and was upset at [place]. CHW again offered support for patient but patient walked out of room and left clinic.        Jamie Chaudhry, BSW   Community Health Worker  [facility]

## 2020-01-13 NOTE — NURSING NOTE
"Chief Complaint   Patient presents with     Mental Health Problem     follow up     Pain       Initial BP 92/64   Pulse 94   Temp 96.9  F (36.1  C) (Tympanic)   Resp 18   Ht 1.74 m (5' 8.5\")   Wt 65.8 kg (145 lb)   SpO2 96%   BMI 21.73 kg/m   Estimated body mass index is 21.73 kg/m  as calculated from the following:    Height as of this encounter: 1.74 m (5' 8.5\").    Weight as of this encounter: 65.8 kg (145 lb).  Medication Reconciliation: complete  Zaria García LPN  "

## 2020-01-13 NOTE — PATIENT INSTRUCTIONS
Patient Education     Back Pain (Acute or Chronic)    Back pain is one of the most common problems. The good news is that most people feel better in 1 to 2 weeks, and most of the rest in 1 to 2 months. Most people can remain active.  People experience and describe pain differently; not everyone is the same.    The pain can be sharp, stabbing, shooting, aching, cramping or burning.    Movement, standing, bending, lifting, sitting, or walking may worsen pain.    It can be localized to one spot or area, or it can be more generalized.    It can spread or radiate upwards, to the front, or go down your arms or legs (sciatica).    It can cause muscle spasm.  Most of the time, mechanical problems with the muscles or spine cause the pain. Mechanical problems are usually caused by an injury to the muscles or ligaments. While illness can cause back pain, it is usually not caused by a serious illness. Mechanical problems include:     Physical activity such as sports, exercise, work, or normal activity    Overexertion, lifting, pushing, pulling incorrectly or too aggressively    Sudden twisting, bending, or stretching from an accident, or accidental movement    Poor posture    Stretching or moving wrong, without noticing pain at the time    Poor coordination, lack of regular exercise (check with your doctor about this)    Spinal disc disease or arthritis    Stress  Pain can also be related to pregnancy, or illness like appendicitis, bladder or kidney infections, pelvic infections, and many other things.  Acute back pain usually gets better in 1 to 2 weeks. Back pain related to disk disease, arthritis in the spinal joints or spinal stenosis (narrowing of the spinal canal) can become chronic and last for months or years.  Unless you had a physical injury (for example, a car accident or fall) X-rays are usually not needed for the initial evaluation of back pain. If pain continues and does not respond to medical treatment, X-rays  and other tests may be needed.  Home care  Try these home care recommendations:    When in bed, try to find a position of comfort. A firm mattress is best. Try lying flat on your back with pillows under your knees. You can also try lying on your side with your knees bent up towards your chest and a pillow between your knees.    At first, do not try to stretch out the sore spots. If there is a strain, it is not like the good soreness you get after exercising without an injury. In this case, stretching may make it worse.    Avoid prolong sitting, long car rides, or travel. This puts more stress on the lower back than standing or walking.    During the first 24 to 72 hours after an acute injury or flare up of chronic back pain, apply an ice pack to the painful area for 20 minutes and then remove it for 20 minutes. Do this over a period of 60 to 90 minutes or several times a day. This will reduce swelling and pain. Wrap the ice pack in a thin towel or plastic to protect your skin.    You can start with ice, then switch to heat. Heat (hot shower, hot bath, or heating pad) reduces pain and works well for muscle spasms. Heat can be applied to the painful area for 20 minutes then remove it for 20 minutes. Do this over a period of 60 to 90 minutes or several times a day. Do not sleep on a heating pad. It can lead to skin burns or tissue damage.    You can alternate ice and heat therapy. Talk with your doctor about the best treatment for your back pain.    Therapeutic massage can help relax the back muscles without stretching them.    Be aware of safe lifting methods and do not lift anything without stretching first.  Medicines  Talk to your doctor before using medicine, especially if you have other medical problems or are taking other medicines.    You may use over-the-counter medicine as directed on the bottle to control pain, unless another pain medicine was prescribed. If you have chronic conditions like diabetes, liver  or kidney disease, stomach ulcers, or gastrointestinal bleeding, or are taking blood thinners, talk to your doctor before taking any medicine.    Be careful if you are given a prescription medicines, narcotics, or medicine for muscle spasms. They can cause drowsiness, affect your coordination, reflexes, and judgement. Do not drive or operate heavy machinery.  Follow-up care  Follow up with your healthcare provider, or as advised.   A radiologist will review any X-rays that were taken. Your provide will notify you of any new findings that may affect your care.  Call 911  Call emergency services if any of the following occur:    Trouble breathing    Confusion    Very drowsy or trouble awakening    Fainting or loss of consciousness    Rapid or very slow heart rate    Loss of bowel or bladder control  When to seek medical advice  Call your healthcare provider right away if any of these occur:     Pain becomes worse or spreads to your legs    Weakness or numbness in one or both legs    Numbness in the groin or genital area  Date Last Reviewed: 7/1/2016 2000-2017 [employer]. [address] [place] [id number]. All rights reserved. This information is not intended as a substitute for professional medical care. Always follow your healthcare professional's instructions.

## 2020-01-14 ASSESSMENT — ANXIETY QUESTIONNAIRES: GAD7 TOTAL SCORE: 9

## 2020-01-22 ENCOUNTER — TELEPHONE (OUTPATIENT)
Dept: FAMILY MEDICINE | Facility: OTHER | Age: 45
End: 2020-01-22

## 2022-09-24 ENCOUNTER — HOSPITAL ENCOUNTER (EMERGENCY)
Facility: HOSPITAL | Age: 47
Discharge: HOME | End: 2022-09-24
Attending: STUDENT IN AN ORGANIZED HEALTH CARE EDUCATION/TRAINING PROGRAM | Admitting: STUDENT IN AN ORGANIZED HEALTH CARE EDUCATION/TRAINING PROGRAM
Payer: COMMERCIAL

## 2022-09-24 VITALS
RESPIRATION RATE: 16 BRPM | SYSTOLIC BLOOD PRESSURE: 108 MMHG | DIASTOLIC BLOOD PRESSURE: 80 MMHG | OXYGEN SATURATION: 96 % | HEIGHT: 70 IN | TEMPERATURE: 99 F | WEIGHT: 160 LBS | HEART RATE: 76 BPM | BODY MASS INDEX: 22.9 KG/M2

## 2022-09-24 DIAGNOSIS — R10.32 ABDOMINAL PAIN, LEFT LOWER QUADRANT: ICD-10-CM

## 2022-09-24 LAB
ALBUMIN SERPL-MCNC: 4.1 G/DL (ref 3.4–5)
ALBUMIN UR-MCNC: 30 MG/DL
ALP SERPL-CCNC: 63 U/L (ref 40–150)
ALT SERPL W P-5'-P-CCNC: 21 U/L (ref 0–70)
ANION GAP SERPL CALCULATED.3IONS-SCNC: 4 MMOL/L (ref 3–14)
APPEARANCE UR: CLEAR
AST SERPL W P-5'-P-CCNC: 13 U/L (ref 0–45)
BASOPHILS # BLD AUTO: 0 10E3/UL (ref 0–0.2)
BASOPHILS NFR BLD AUTO: 0 %
BILIRUB SERPL-MCNC: 0.5 MG/DL (ref 0.2–1.3)
BILIRUB UR QL STRIP: NEGATIVE
BUN SERPL-MCNC: 15 MG/DL (ref 7–30)
CALCIUM SERPL-MCNC: 8.8 MG/DL (ref 8.5–10.1)
CHLORIDE BLD-SCNC: 104 MMOL/L (ref 94–109)
CO2 SERPL-SCNC: 28 MMOL/L (ref 20–32)
COLOR UR AUTO: YELLOW
CREAT SERPL-MCNC: 1.01 MG/DL (ref 0.66–1.25)
EOSINOPHIL # BLD AUTO: 0 10E3/UL (ref 0–0.7)
EOSINOPHIL NFR BLD AUTO: 1 %
ERYTHROCYTE [DISTWIDTH] IN BLOOD BY AUTOMATED COUNT: 13 % (ref 10–15)
GFR SERPL CREATININE-BSD FRML MDRD: >90 ML/MIN/1.73M2
GLUCOSE BLD-MCNC: 105 MG/DL (ref 70–99)
GLUCOSE UR STRIP-MCNC: NEGATIVE MG/DL
HCT VFR BLD AUTO: 42.8 % (ref 40–53)
HGB BLD-MCNC: 14.6 G/DL (ref 13.3–17.7)
HGB UR QL STRIP: NEGATIVE
IMM GRANULOCYTES # BLD: 0 10E3/UL
IMM GRANULOCYTES NFR BLD: 0 %
KETONES UR STRIP-MCNC: NEGATIVE MG/DL
LEUKOCYTE ESTERASE UR QL STRIP: NEGATIVE
LIPASE SERPL-CCNC: 263 U/L (ref 73–393)
LYMPHOCYTES # BLD AUTO: 0.4 10E3/UL (ref 0.8–5.3)
LYMPHOCYTES NFR BLD AUTO: 9 %
MCH RBC QN AUTO: 31.6 PG (ref 26.5–33)
MCHC RBC AUTO-ENTMCNC: 34.1 G/DL (ref 31.5–36.5)
MCV RBC AUTO: 93 FL (ref 78–100)
MONOCYTES # BLD AUTO: 0.7 10E3/UL (ref 0–1.3)
MONOCYTES NFR BLD AUTO: 15 %
MUCOUS THREADS #/AREA URNS LPF: PRESENT /LPF
NEUTROPHILS # BLD AUTO: 3.4 10E3/UL (ref 1.6–8.3)
NEUTROPHILS NFR BLD AUTO: 75 %
NITRATE UR QL: NEGATIVE
NRBC # BLD AUTO: 0 10E3/UL
NRBC BLD AUTO-RTO: 0 /100
PH UR STRIP: 6 [PH] (ref 4.7–8)
PLATELET # BLD AUTO: 203 10E3/UL (ref 150–450)
POTASSIUM BLD-SCNC: 3.9 MMOL/L (ref 3.4–5.3)
PROT SERPL-MCNC: 7.8 G/DL (ref 6.8–8.8)
RBC # BLD AUTO: 4.62 10E6/UL (ref 4.4–5.9)
RBC URINE: 1 /HPF
SODIUM SERPL-SCNC: 136 MMOL/L (ref 133–144)
SP GR UR STRIP: 1.03 (ref 1–1.03)
SQUAMOUS EPITHELIAL: 0 /HPF
UROBILINOGEN UR STRIP-MCNC: 2 MG/DL
WBC # BLD AUTO: 4.5 10E3/UL (ref 4–11)
WBC URINE: 3 /HPF

## 2022-09-24 PROCEDURE — 85004 AUTOMATED DIFF WBC COUNT: CPT | Performed by: STUDENT IN AN ORGANIZED HEALTH CARE EDUCATION/TRAINING PROGRAM

## 2022-09-24 PROCEDURE — 81003 URINALYSIS AUTO W/O SCOPE: CPT | Performed by: STUDENT IN AN ORGANIZED HEALTH CARE EDUCATION/TRAINING PROGRAM

## 2022-09-24 PROCEDURE — 82040 ASSAY OF SERUM ALBUMIN: CPT | Performed by: STUDENT IN AN ORGANIZED HEALTH CARE EDUCATION/TRAINING PROGRAM

## 2022-09-24 PROCEDURE — 93005 ELECTROCARDIOGRAM TRACING: CPT

## 2022-09-24 PROCEDURE — 36415 COLL VENOUS BLD VENIPUNCTURE: CPT | Performed by: STUDENT IN AN ORGANIZED HEALTH CARE EDUCATION/TRAINING PROGRAM

## 2022-09-24 PROCEDURE — 99284 EMERGENCY DEPT VISIT MOD MDM: CPT | Performed by: STUDENT IN AN ORGANIZED HEALTH CARE EDUCATION/TRAINING PROGRAM

## 2022-09-24 PROCEDURE — 93010 ELECTROCARDIOGRAM REPORT: CPT | Performed by: INTERNAL MEDICINE

## 2022-09-24 PROCEDURE — 80053 COMPREHEN METABOLIC PANEL: CPT | Performed by: STUDENT IN AN ORGANIZED HEALTH CARE EDUCATION/TRAINING PROGRAM

## 2022-09-24 PROCEDURE — 83690 ASSAY OF LIPASE: CPT | Performed by: STUDENT IN AN ORGANIZED HEALTH CARE EDUCATION/TRAINING PROGRAM

## 2022-09-24 PROCEDURE — 99284 EMERGENCY DEPT VISIT MOD MDM: CPT

## 2022-09-24 RX ORDER — IOPAMIDOL 755 MG/ML
80 INJECTION, SOLUTION INTRAVASCULAR ONCE
Status: DISCONTINUED | OUTPATIENT
Start: 2022-09-24 | End: 2022-09-24 | Stop reason: HOSPADM

## 2022-09-24 RX ORDER — OLANZAPINE 10 MG/1
5 INJECTION, POWDER, LYOPHILIZED, FOR SOLUTION INTRAMUSCULAR ONCE
Status: DISCONTINUED | OUTPATIENT
Start: 2022-09-24 | End: 2022-09-24 | Stop reason: HOSPADM

## 2022-09-24 RX ORDER — DROPERIDOL 2.5 MG/ML
1.25 INJECTION, SOLUTION INTRAMUSCULAR; INTRAVENOUS ONCE
Status: DISCONTINUED | OUTPATIENT
Start: 2022-09-24 | End: 2022-09-24 | Stop reason: HOSPADM

## 2022-09-24 NOTE — ED PROVIDER NOTES
History     Chief Complaint   Patient presents with     Abdominal Pain     Psychiatric Evaluation     HPI  Jose Alberto Lu is a 47 year old male with history of anxiety polysubstance abuse, schizoaffective disorder and dysthymic disorder who presents to the emergency department today complaining of chills and sweats and left lower abdominal pain that he states started 12 years ago with hernia mesh placement, but that over the last week or 2 the pain has been more constant.  The nursing reports as he has had diarrhea a couple of days ago, but he tells me that he has been constipated and his last bowel movement was yesterday and they were like hard rocks.  He states he has some nausea but no vomiting.  No blood in stool, no melena.  He states he is noncompliant with his psychiatric medications.    Allergies:  No Known Allergies    Problem List:    Patient Active Problem List    Diagnosis Date Noted     Suicidal ideation 08/26/2018     Priority: Medium     Polysubstance abuse (H) 09/28/2011     Priority: Medium     MARY (generalized anxiety disorder) 07/16/2009     Priority: Medium     Overview:   IMO Update 10/11       GERD (gastroesophageal reflux disease) 07/16/2009     Priority: Medium     Hypothyroidism 07/16/2009     Priority: Medium     Schizoaffective disorder (H) 07/16/2009     Priority: Medium     Dysthymic disorder 05/13/2008     Priority: Medium        Past Medical History:    Past Medical History:   Diagnosis Date     Anxiety      Depression      Schizoaffective disorder (H)      TBI (traumatic brain injury) (H) 1991     TMJ (dislocation of temporomandibular joint)        Past Surgical History:    Past Surgical History:   Procedure Laterality Date     BACK SURGERY  2009 or 2010     BRAIN SURGERY  1991    Reports part of skull removed      HAND SURGERY Right 1996     HERNIA REPAIR  2009 or 2010       Family History:    Family History   Family history unknown: Yes       Social History:  Marital Status:   "Single [1]  Social History     Tobacco Use     Smoking status: Current Every Day Smoker     Packs/day: 0.50     Years: 20.00     Pack years: 10.00     Types: Cigarettes     Start date: 1/1/1993     Smokeless tobacco: Never Used   Substance Use Topics     Alcohol use: No     Drug use: Yes     Frequency: 7.0 times per week     Types: Marijuana        Medications:    No current outpatient medications on file.        Review of Systems  A complete review of systems was performed and is otherwise negative.     Physical Exam   BP: 104/71  Pulse: 93  Temp: 99  F (37.2  C)  Resp: 16  Height: 176.5 cm (5' 9.5\")  Weight: 72.6 kg (160 lb)  SpO2: 96 %      Physical Exam  Constitutional: Alert and conversant. NAD   HENT: NCAT   Eyes: Normal pupils   Neck: supple   CV: Normal rate, regular rhythm, no murmur   Pulmonary/Chest: Non-labored respirations, clear to auscultation bilaterally   Abdominal: Soft, non-distended, diffusely tender, no rebound no guarding  MSK: LAZCANO.   Neuro: Alert and appropriate   Skin: Warm and dry. No diaphoresis. No rashes on exposed skin    Psych: Appropriate mood and affect     ED Course       ED Course as of 09/24/22 0820   Sat Sep 24, 2022   0727 Differential includes but is not limited to appendicitis, cholecystitis, pancreatitis, nephrolithiasis, gastroesophageal reflux disease, gastritis, pyelonephritis, urinary tract infection, testicular torsion, mesenteric ischemia, strangulated hernia, aortic aneurysm.   Based on the patient's behavior and history, psychiatric paranoia is also on the differential.  He states he is off his medications   0818 All of the labs came back looking normal and reassuring.  No concerning findings no leukocytosis to suggest infectious etiology.  Exam was overall reassuring, however, CT is reasonable for evaluation of his pain to look for things like diverticulitis or some complication from his mesh.   0819 After the labs came back   0819 , patient decided that he did not " want to get the CT and that he wanted to leave.  When asked why, he said he was tired of working with [facility] and that he wanted to go to [place].  I explained that he was always welcome to come back to complete his evaluation and he expressed his understanding.     Procedures         Results for orders placed or performed during the hospital encounter of 09/24/22 (from the past 24 hour(s))   CBC with platelets differential    Narrative    The following orders were created for panel order CBC with platelets differential.  Procedure                               Abnormality         Status                     ---------                               -----------         ------                     CBC with platelets and d...[[id number]]  Abnormal            Final result                 Please view results for these tests on the individual orders.   Comprehensive metabolic panel   Result Value Ref Range    Sodium 136 133 - 144 mmol/L    Potassium 3.9 3.4 - 5.3 mmol/L    Chloride 104 94 - 109 mmol/L    Carbon Dioxide (CO2) 28 20 - 32 mmol/L    Anion Gap 4 3 - 14 mmol/L    Urea Nitrogen 15 7 - 30 mg/dL    Creatinine 1.01 0.66 - 1.25 mg/dL    Calcium 8.8 8.5 - 10.1 mg/dL    Glucose 105 (H) 70 - 99 mg/dL    Alkaline Phosphatase 63 40 - 150 U/L    AST 13 0 - 45 U/L    ALT 21 0 - 70 U/L    Protein Total 7.8 6.8 - 8.8 g/dL    Albumin 4.1 3.4 - 5.0 g/dL    Bilirubin Total 0.5 0.2 - 1.3 mg/dL    GFR Estimate >90 >60 mL/min/1.73m2   Lipase   Result Value Ref Range    Lipase 263 73 - 393 U/L   CBC with platelets and differential   Result Value Ref Range    WBC Count 4.5 4.0 - 11.0 10e3/uL    RBC Count 4.62 4.40 - 5.90 10e6/uL    Hemoglobin 14.6 13.3 - 17.7 g/dL    Hematocrit 42.8 40.0 - 53.0 %    MCV 93 78 - 100 fL    MCH 31.6 26.5 - 33.0 pg    MCHC 34.1 31.5 - 36.5 g/dL    RDW 13.0 10.0 - 15.0 %    Platelet Count 203 150 - 450 10e3/uL    % Neutrophils 75 %    % Lymphocytes 9 %    % Monocytes 15 %    % Eosinophils 1 %    %  Basophils 0 %    % Immature Granulocytes 0 %    NRBCs per 100 WBC 0 <1 /100    Absolute Neutrophils 3.4 1.6 - 8.3 10e3/uL    Absolute Lymphocytes 0.4 (L) 0.8 - 5.3 10e3/uL    Absolute Monocytes 0.7 0.0 - 1.3 10e3/uL    Absolute Eosinophils 0.0 0.0 - 0.7 10e3/uL    Absolute Basophils 0.0 0.0 - 0.2 10e3/uL    Absolute Immature Granulocytes 0.0 <=0.4 10e3/uL    Absolute NRBCs 0.0 10e3/uL   UA with Microscopic reflex to Culture    Specimen: Urine, Midstream   Result Value Ref Range    Color Urine Yellow Colorless, Straw, Light Yellow, Yellow    Appearance Urine Clear Clear    Glucose Urine Negative Negative mg/dL    Bilirubin Urine Negative Negative    Ketones Urine Negative Negative mg/dL    Specific Gravity Urine 1.034 1.003 - 1.035    Blood Urine Negative Negative    pH Urine 6.0 4.7 - 8.0    Protein Albumin Urine 30  (A) Negative mg/dL    Urobilinogen Urine 2.0 Normal, 2.0 mg/dL    Nitrite Urine Negative Negative    Leukocyte Esterase Urine Negative Negative    Mucus Urine Present (A) None Seen /LPF    RBC Urine 1 <=2 /HPF    WBC Urine 3 <=5 /HPF    Squamous Epithelials Urine 0 <=1 /HPF    Narrative    Urine Culture not indicated       Medications   droperidol (INAPSINE) injection 1.25 mg (has no administration in time range)   OLANZapine (zyPREXA) IV injection 5 mg (has no administration in time range)   iopamidol (ISOVUE-370) solution 80 mL (has no administration in time range)   sodium chloride (PF) 0.9% PF flush 60 mL (has no administration in time range)       Assessments & Plan (with Medical Decision Making)     I have reviewed the nursing notes.    I have reviewed the findings, diagnosis, plan and need for follow up with the patient.      New Prescriptions    No medications on file       Final diagnoses:   Abdominal pain, left lower quadrant       9/24/2022   [facility]     Marquis Gallardo MD  09/24/22 1022

## 2022-09-24 NOTE — DISCHARGE INSTRUCTIONS
Return to the emergency department for worsening symptoms or new concerning symptoms please follow-up with your primary care provider in the next week.  As we discussed you are always welcome to return to the emergency department to complete your evaluation.

## 2022-09-24 NOTE — ED TRIAGE NOTES
"Patient presents to emergency room with c/o \"I am sick.\" Rambling. Anxious. C/o chills and sweats. C/o left lower abdominal pain that radiates down left leg.   \"I think it is the mesh in my stomach.\" pt reports hernia surgery 12 years ago. Diarrhea a couple of days ago. Paranoid. \"I don't like , when seeing the .\"     "

## 2022-09-24 NOTE — ED NOTES
Pt requesting to leave, wants IV pulled.  Provider spoke with pt about labs. All labs are normal.  Does not want CT and want to be seen at another hospital. IV site pulled and pt left without discharge papers.

## 2022-09-25 ENCOUNTER — HOSPITAL ENCOUNTER (EMERGENCY)
Facility: HOSPITAL | Age: 47
Discharge: LEFT BEFORE DISCHARGE | End: 2022-09-25
Admitting: INTERNAL MEDICINE
Payer: COMMERCIAL

## 2022-09-25 ENCOUNTER — APPOINTMENT (OUTPATIENT)
Dept: CT IMAGING | Facility: HOSPITAL | Age: 47
End: 2022-09-25
Attending: INTERNAL MEDICINE
Payer: COMMERCIAL

## 2022-09-25 ENCOUNTER — HOSPITAL ENCOUNTER (EMERGENCY)
Facility: HOSPITAL | Age: 47
Discharge: HOME | End: 2022-09-26
Attending: INTERNAL MEDICINE | Admitting: INTERNAL MEDICINE
Payer: COMMERCIAL

## 2022-09-25 DIAGNOSIS — R10.9 FLANK PAIN: ICD-10-CM

## 2022-09-25 PROCEDURE — 250N000013 HC RX MED GY IP 250 OP 250 PS 637: Performed by: INTERNAL MEDICINE

## 2022-09-25 PROCEDURE — 99284 EMERGENCY DEPT VISIT MOD MDM: CPT | Mod: 25

## 2022-09-25 PROCEDURE — 74176 CT ABD & PELVIS W/O CONTRAST: CPT

## 2022-09-25 PROCEDURE — 999N000104 HC STATISTIC NO CHARGE

## 2022-09-25 PROCEDURE — 99284 EMERGENCY DEPT VISIT MOD MDM: CPT | Performed by: INTERNAL MEDICINE

## 2022-09-25 RX ORDER — DIAZEPAM 5 MG
10 TABLET ORAL ONCE
Status: COMPLETED | OUTPATIENT
Start: 2022-09-25 | End: 2022-09-25

## 2022-09-25 RX ADMIN — DIAZEPAM 10 MG: 5 TABLET ORAL at 23:18

## 2022-09-25 ASSESSMENT — ACTIVITIES OF DAILY LIVING (ADL): ADLS_ACUITY_SCORE: 33

## 2022-09-26 VITALS
HEART RATE: 88 BPM | OXYGEN SATURATION: 96 % | SYSTOLIC BLOOD PRESSURE: 110 MMHG | RESPIRATION RATE: 16 BRPM | DIASTOLIC BLOOD PRESSURE: 68 MMHG | TEMPERATURE: 99.1 F

## 2022-09-26 NOTE — ED NOTES
States that he is here for same symptoms as yesterday. Then states that if he could sleep, he wouldn't be here. I asked him why he couldn't sleep and he states because of the pain. He states they wanted to do a CT yesterday, but he left instead. I asked him why he decided to leave and he states he is tired of [facility] as they never help him. I did ask him if he ended going to [facility] as he mentioned yesterday when he was here, and he answers that he did not go to [facility].

## 2022-09-26 NOTE — ED NOTES
Patient is resting in bed with eyes closed when this RN enters room. Discharge instructions gone over with patient and he states understanding. Discharged in stable condition, ambulatory.

## 2022-09-26 NOTE — ED TRIAGE NOTES
States developed right flank pain 1.5 days ago. Was here yesterday for same. States they were going to do a CT, but he left.     Triage Assessment     Row Name 09/25/22 1031       Triage Assessment (Adult)    Airway WDL WDL

## 2022-09-27 ASSESSMENT — ENCOUNTER SYMPTOMS
COLOR CHANGE: 0
MYALGIAS: 0
PALPITATIONS: 0
BACK PAIN: 0
COUGH: 0
VOICE CHANGE: 0
CHEST TIGHTNESS: 0
FEVER: 0
SHORTNESS OF BREATH: 0
FREQUENCY: 0
DIAPHORESIS: 0
DYSURIA: 0
NECK PAIN: 0
LIGHT-HEADEDNESS: 0
SLEEP DISTURBANCE: 0
FLANK PAIN: 0
CONFUSION: 0
ABDOMINAL DISTENTION: 0
NUMBNESS: 0
VOMITING: 0
WEAKNESS: 0
DIZZINESS: 0
BLOOD IN STOOL: 0
ABDOMINAL PAIN: 1
WHEEZING: 0
ANAL BLEEDING: 0
NAUSEA: 0
HEADACHES: 0
CHILLS: 0

## 2022-09-27 NOTE — ED PROVIDER NOTES
History     Chief Complaint   Patient presents with     Flank Pain     The history is provided by the patient.   Abdominal Pain  Pain location:  L flank and R flank  Pain quality: sharp    Pain radiates to:  Does not radiate  Pain severity:  Severe  Timing:  Intermittent  Progression:  Waxing and waning  Chronicity:  New  Associated symptoms: no chest pain, no chills, no cough, no dysuria, no fever, no nausea, no shortness of breath and no vomiting          Allergies:  No Known Allergies    Problem List:    Patient Active Problem List    Diagnosis Date Noted     Suicidal ideation 08/26/2018     Priority: Medium     Polysubstance abuse (H) 09/28/2011     Priority: Medium     MARY (generalized anxiety disorder) 07/16/2009     Priority: Medium     Overview:   IMO Update 10/11       GERD (gastroesophageal reflux disease) 07/16/2009     Priority: Medium     Hypothyroidism 07/16/2009     Priority: Medium     Schizoaffective disorder (H) 07/16/2009     Priority: Medium     Dysthymic disorder 05/13/2008     Priority: Medium        Past Medical History:    Past Medical History:   Diagnosis Date     Anxiety      Depression      Schizoaffective disorder (H)      TBI (traumatic brain injury) (H) 1991     TMJ (dislocation of temporomandibular joint)        Past Surgical History:    Past Surgical History:   Procedure Laterality Date     BACK SURGERY  2009 or 2010     BRAIN SURGERY  1991    Reports part of skull removed      HAND SURGERY Right 1996     HERNIA REPAIR  2009 or 2010       Family History:    Family History   Family history unknown: Yes       Social History:  Marital Status:  Single [1]  Social History     Tobacco Use     Smoking status: Current Every Day Smoker     Packs/day: 0.50     Years: 20.00     Pack years: 10.00     Types: Cigarettes     Start date: 1/1/1993     Smokeless tobacco: Never Used   Substance Use Topics     Alcohol use: No     Drug use: Yes     Frequency: 7.0 times per week     Types: Marijuana         Medications:    No current outpatient medications on file.        Review of Systems   Constitutional: Negative for chills, diaphoresis and fever.   HENT: Negative for voice change.    Eyes: Negative for visual disturbance.   Respiratory: Negative for cough, chest tightness, shortness of breath and wheezing.    Cardiovascular: Negative for chest pain, palpitations and leg swelling.   Gastrointestinal: Positive for abdominal pain. Negative for abdominal distention, anal bleeding, blood in stool, nausea and vomiting.   Genitourinary: Negative for decreased urine volume, dysuria, flank pain and frequency.   Musculoskeletal: Negative for back pain, gait problem, myalgias and neck pain.   Skin: Negative for color change, pallor and rash.   Neurological: Negative for dizziness, syncope, weakness, light-headedness, numbness and headaches.   Psychiatric/Behavioral: Negative for confusion, sleep disturbance and suicidal ideas.       Physical Exam   BP: 104/70  Pulse: 100  Temp: 99.1  F (37.3  C)  Resp: 16  SpO2: 96 %      Physical Exam  Vitals and nursing note reviewed.   Constitutional:       Appearance: He is well-developed.   HENT:      Head: Normocephalic and atraumatic.   Eyes:      Conjunctiva/sclera: Conjunctivae normal.      Pupils: Pupils are equal, round, and reactive to light.   Neck:      Thyroid: No thyromegaly.      Vascular: No JVD.      Trachea: No tracheal deviation.   Cardiovascular:      Rate and Rhythm: Normal rate and regular rhythm.      Heart sounds: Normal heart sounds. No murmur heard.    No gallop.   Pulmonary:      Effort: Pulmonary effort is normal. No respiratory distress.      Breath sounds: Normal breath sounds. No stridor. No wheezing or rales.   Chest:      Chest wall: No tenderness.   Abdominal:      General: Bowel sounds are normal. There is no distension.      Palpations: Abdomen is soft. There is no mass.      Tenderness: There is no abdominal tenderness. There is no guarding or  rebound.   Musculoskeletal:         General: No tenderness. Normal range of motion.      Cervical back: Normal range of motion and neck supple.   Lymphadenopathy:      Cervical: No cervical adenopathy.   Skin:     General: Skin is warm.      Coloration: Skin is not pale.      Findings: No erythema or rash.   Neurological:      Mental Status: He is alert and oriented to person, place, and time.   Psychiatric:         Behavior: Behavior normal.         ED Course                 Procedures                No results found for this or any previous visit (from the past 24 hour(s)).    Medications   diazepam (VALIUM) tablet 10 mg (10 mg Oral Given 9/25/22 4914)       Assessments & Plan (with Medical Decision Making)   B/l flank pain, concerned of kidney stones  CT abd negative  Symptoms resolved after receiving valum in ER, likely muscular pain  D C home  I have reviewed the nursing notes.    I have reviewed the findings, diagnosis, plan and need for follow up with the patient.      There are no discharge medications for this patient.      Final diagnoses:   Flank pain       9/25/2022   [facility]     Paramjit Perkins MD  09/27/22 1814

## 2022-09-28 PROCEDURE — 999N000104 HC STATISTIC NO CHARGE

## 2022-11-28 ENCOUNTER — HOSPITAL ENCOUNTER (EMERGENCY)
Facility: HOSPITAL | Age: 47
Discharge: LEFT BEFORE DISCHARGE | End: 2022-11-28
Admitting: NURSE PRACTITIONER
Payer: COMMERCIAL

## 2022-11-28 VITALS
OXYGEN SATURATION: 98 % | RESPIRATION RATE: 18 BRPM | SYSTOLIC BLOOD PRESSURE: 112 MMHG | HEART RATE: 77 BPM | TEMPERATURE: 98.4 F | DIASTOLIC BLOOD PRESSURE: 87 MMHG

## 2022-11-28 PROCEDURE — 999N000104 HC STATISTIC NO CHARGE

## 2024-06-03 ENCOUNTER — HOSPITAL ENCOUNTER (EMERGENCY)
Facility: HOSPITAL | Age: 49
Discharge: HOME | End: 2024-06-03
Attending: INTERNAL MEDICINE | Admitting: INTERNAL MEDICINE
Payer: COMMERCIAL

## 2024-06-03 VITALS
TEMPERATURE: 98.7 F | RESPIRATION RATE: 18 BRPM | HEART RATE: 112 BPM | SYSTOLIC BLOOD PRESSURE: 146 MMHG | BODY MASS INDEX: 22.9 KG/M2 | HEIGHT: 70 IN | OXYGEN SATURATION: 97 % | DIASTOLIC BLOOD PRESSURE: 102 MMHG | WEIGHT: 160 LBS

## 2024-06-03 DIAGNOSIS — K04.7 DENTAL INFECTION: ICD-10-CM

## 2024-06-03 PROCEDURE — 250N000013 HC RX MED GY IP 250 OP 250 PS 637: Performed by: INTERNAL MEDICINE

## 2024-06-03 PROCEDURE — 99283 EMERGENCY DEPT VISIT LOW MDM: CPT | Performed by: INTERNAL MEDICINE

## 2024-06-03 PROCEDURE — 99283 EMERGENCY DEPT VISIT LOW MDM: CPT

## 2024-06-03 RX ORDER — AMOXICILLIN 500 MG/1
500 CAPSULE ORAL ONCE
Status: COMPLETED | OUTPATIENT
Start: 2024-06-03 | End: 2024-06-03

## 2024-06-03 RX ORDER — OXYCODONE HYDROCHLORIDE 5 MG/1
5 TABLET ORAL ONCE
Status: COMPLETED | OUTPATIENT
Start: 2024-06-03 | End: 2024-06-03

## 2024-06-03 RX ORDER — AMOXICILLIN 500 MG/1
500 CAPSULE ORAL 3 TIMES DAILY
Qty: 21 CAPSULE | Refills: 0 | Status: SHIPPED | OUTPATIENT
Start: 2024-06-03 | End: 2024-06-10

## 2024-06-03 RX ADMIN — OXYCODONE HYDROCHLORIDE 5 MG: 5 TABLET ORAL at 22:45

## 2024-06-03 RX ADMIN — AMOXICILLIN 500 MG: 500 CAPSULE ORAL at 22:44

## 2024-06-03 ASSESSMENT — COLUMBIA-SUICIDE SEVERITY RATING SCALE - C-SSRS
2. HAVE YOU ACTUALLY HAD ANY THOUGHTS OF KILLING YOURSELF IN THE PAST MONTH?: NO
1. IN THE PAST MONTH, HAVE YOU WISHED YOU WERE DEAD OR WISHED YOU COULD GO TO SLEEP AND NOT WAKE UP?: NO
6. HAVE YOU EVER DONE ANYTHING, STARTED TO DO ANYTHING, OR PREPARED TO DO ANYTHING TO END YOUR LIFE?: NO

## 2024-06-04 ASSESSMENT — ENCOUNTER SYMPTOMS
FACIAL SWELLING: 1
SHORTNESS OF BREATH: 0
COUGH: 0
FEVER: 0
ABDOMINAL PAIN: 0

## 2024-06-04 NOTE — ED NOTES
Pt presents with dental pain that started yesterday. Pt has swelling on the right lower anterior side of jaw that appeared today. Pt reports a pain 7/10. Pt states he thinks he had a fever yesterday but didn't verify with thermometer. Pt took tylenol and ibuprofen with no relief yesterday and today. Pt states he was told by someone that he needs to be on abx for 3 days before a dentist will see

## 2024-06-04 NOTE — ED TRIAGE NOTES
"Patient arrived by private auto with c/o dental pain lower right jaw swelling.  Patient has has this for 2 days now with hx of dental issues but \"no pain for a long time now.\"        "

## 2024-06-05 NOTE — ED PROVIDER NOTES
History     Chief Complaint   Patient presents with    Dental Pain     The history is provided by the patient.   Dental Pain  Location:  Lower  Lower teeth location:  31/RL 2nd molar  Quality:  Aching  Onset quality:  Gradual  Duration:  3 days  Timing:  Constant  Progression:  Worsening  Associated symptoms: facial swelling    Associated symptoms: no fever        Allergies:  No Known Allergies    Problem List:    Patient Active Problem List    Diagnosis Date Noted    Suicidal ideation 08/26/2018     Priority: Medium    Polysubstance abuse (H) 09/28/2011     Priority: Medium    MARY (generalized anxiety disorder) 07/16/2009     Priority: Medium     Overview:   IMO Update 10/11      GERD (gastroesophageal reflux disease) 07/16/2009     Priority: Medium    Hypothyroidism 07/16/2009     Priority: Medium    Schizoaffective disorder (H) 07/16/2009     Priority: Medium    Dysthymic disorder 05/13/2008     Priority: Medium        Past Medical History:    Past Medical History:   Diagnosis Date    Anxiety     Depression     Schizoaffective disorder (H)     TBI (traumatic brain injury) (H) 1991    TMJ (dislocation of temporomandibular joint)        Past Surgical History:    Past Surgical History:   Procedure Laterality Date    BACK SURGERY  2009 or 2010    BRAIN SURGERY  1991    Reports part of skull removed     HAND SURGERY Right 1996    HERNIA REPAIR  2009 or 2010       Family History:    Family History   Family history unknown: Yes       Social History:  Marital Status:  Single [1]  Social History     Tobacco Use    Smoking status: Every Day     Current packs/day: 0.50     Average packs/day: 0.5 packs/day for 31.4 years (15.7 ttl pk-yrs)     Types: Cigarettes     Start date: 1/1/1993    Smokeless tobacco: Never   Substance Use Topics    Alcohol use: No    Drug use: Yes     Frequency: 7.0 times per week     Types: Marijuana        Medications:    amoxicillin (AMOXIL) 500 MG capsule          Review of Systems  "  Constitutional:  Negative for fever.   HENT:  Positive for facial swelling.    Respiratory:  Negative for cough and shortness of breath.    Cardiovascular:  Negative for chest pain.   Gastrointestinal:  Negative for abdominal pain.   Skin:  Negative for rash.   All other systems reviewed and are negative.      Physical Exam   BP: 131/92  Pulse: 109  Temp: 98.7  F (37.1  C)  Resp: 18  Height: 177.8 cm (5' 10\")  Weight: 72.6 kg (160 lb)  SpO2: 96 %      Physical Exam  Constitutional:       General: He is not in acute distress.     Appearance: Normal appearance. He is not toxic-appearing.   HENT:      Head: Atraumatic.      Mouth/Throat:      Dentition: Dental tenderness and gingival swelling present.      Tongue: Tongue does not deviate from midline.      Pharynx: Uvula midline. No uvula swelling.      Tonsils: No tonsillar abscesses.     Eyes:      General: No scleral icterus.     Conjunctiva/sclera: Conjunctivae normal.   Cardiovascular:      Rate and Rhythm: Normal rate.      Heart sounds: Normal heart sounds.   Pulmonary:      Effort: Pulmonary effort is normal. No respiratory distress.      Breath sounds: Normal breath sounds.   Abdominal:      Palpations: Abdomen is soft.      Tenderness: There is no abdominal tenderness.   Musculoskeletal:         General: No deformity.      Cervical back: Neck supple.   Skin:     General: Skin is warm.   Neurological:      Mental Status: He is alert.         ED Course        Procedures                No results found for this or any previous visit (from the past 24 hour(s)).    Medications   oxyCODONE (ROXICODONE) tablet 5 mg (5 mg Oral $Given 6/3/24 2245)   amoxicillin (AMOXIL) capsule 500 mg (500 mg Oral $Given 6/3/24 2244)       Assessments & Plan (with Medical Decision Making)   Dental pain and infection  1 dose of oxycodone given+ amoxiciliin started   Follow-up with PCP  I have reviewed the nursing notes.            Discharge Medication List as of 6/3/2024 10:46 PM    "     START taking these medications    Details   amoxicillin (AMOXIL) 500 MG capsule Take 1 capsule (500 mg) by mouth 3 times daily for 7 days, Disp-21 capsule, R-0, E-Prescribe             Final diagnoses:   Dental infection       6/3/2024   [facility]       Paramjit Perkins MD  06/04/24 1957

## 2024-10-10 ENCOUNTER — HOSPITAL ENCOUNTER (EMERGENCY)
Facility: HOSPITAL | Age: 49
Discharge: HOME | End: 2024-10-10
Attending: PHYSICIAN ASSISTANT | Admitting: PHYSICIAN ASSISTANT
Payer: COMMERCIAL

## 2024-10-10 VITALS
TEMPERATURE: 98.1 F | RESPIRATION RATE: 18 BRPM | HEART RATE: 116 BPM | OXYGEN SATURATION: 98 % | SYSTOLIC BLOOD PRESSURE: 142 MMHG | DIASTOLIC BLOOD PRESSURE: 95 MMHG

## 2024-10-10 DIAGNOSIS — K04.7 DENTAL ABSCESS: ICD-10-CM

## 2024-10-10 PROCEDURE — 99283 EMERGENCY DEPT VISIT LOW MDM: CPT | Performed by: PHYSICIAN ASSISTANT

## 2024-10-10 PROCEDURE — 99284 EMERGENCY DEPT VISIT MOD MDM: CPT

## 2024-10-10 RX ORDER — KETOROLAC TROMETHAMINE 10 MG/1
10 TABLET, FILM COATED ORAL EVERY 6 HOURS PRN
Qty: 20 TABLET | Refills: 0 | Status: SHIPPED | OUTPATIENT
Start: 2024-10-10

## 2024-10-10 ASSESSMENT — COLUMBIA-SUICIDE SEVERITY RATING SCALE - C-SSRS
6. HAVE YOU EVER DONE ANYTHING, STARTED TO DO ANYTHING, OR PREPARED TO DO ANYTHING TO END YOUR LIFE?: NO
2. HAVE YOU ACTUALLY HAD ANY THOUGHTS OF KILLING YOURSELF IN THE PAST MONTH?: NO
1. IN THE PAST MONTH, HAVE YOU WISHED YOU WERE DEAD OR WISHED YOU COULD GO TO SLEEP AND NOT WAKE UP?: NO

## 2024-10-10 ASSESSMENT — ACTIVITIES OF DAILY LIVING (ADL): ADLS_ACUITY_SCORE: 33

## 2024-10-10 NOTE — ED TRIAGE NOTES
Patient has been having tooth pain, upper right side, for a couple of days. Swelling noticed this morning

## 2024-10-11 NOTE — ED PROVIDER NOTES
History     Chief Complaint   Patient presents with    Dental Pain     The history is provided by the patient.     Jose Alberto Lu is a 49 year old male who presented to the emergency department ambulatory for evaluation of dental pain.  Also been having some mild right-sided facial swelling.  No fevers or chills.  No vomiting.  No difficulty breathing or swallowing.    Allergies:  No Known Allergies    Problem List:    Patient Active Problem List    Diagnosis Date Noted    Suicidal ideation 08/26/2018     Priority: Medium    Polysubstance abuse (H) 09/28/2011     Priority: Medium    AMRY (generalized anxiety disorder) 07/16/2009     Priority: Medium     Overview:   IMO Update 10/11      GERD (gastroesophageal reflux disease) 07/16/2009     Priority: Medium    Hypothyroidism 07/16/2009     Priority: Medium    Schizoaffective disorder (H) 07/16/2009     Priority: Medium    Dysthymic disorder 05/13/2008     Priority: Medium        Past Medical History:    Past Medical History:   Diagnosis Date    Anxiety     Depression     Schizoaffective disorder (H)     TBI (traumatic brain injury) (H) 1991    TMJ (dislocation of temporomandibular joint)        Past Surgical History:    Past Surgical History:   Procedure Laterality Date    BACK SURGERY  2009 or 2010    BRAIN SURGERY  1991    Reports part of skull removed     HAND SURGERY Right 1996    HERNIA REPAIR  2009 or 2010       Family History:    Family History   Family history unknown: Yes       Social History:  Marital Status:  Single [1]  Social History     Tobacco Use    Smoking status: Every Day     Current packs/day: 0.50     Average packs/day: 0.5 packs/day for 31.8 years (15.9 ttl pk-yrs)     Types: Cigarettes     Start date: 1/1/1993    Smokeless tobacco: Never   Substance Use Topics    Alcohol use: No    Drug use: Yes     Frequency: 7.0 times per week     Types: Marijuana        Medications:    amoxicillin-clavulanate (AUGMENTIN) 875-125 MG tablet  ketorolac  (TORADOL) 10 MG tablet          Review of Systems   HENT:          See HPI       Physical Exam   BP: 142/95  Pulse: 116  Temp: 98.1  F (36.7  C)  Resp: 18  SpO2: 98 %      Physical Exam  Vitals and nursing note reviewed.   Constitutional:       General: He is not in acute distress.     Appearance: Normal appearance. He is normal weight. He is not ill-appearing, toxic-appearing or diaphoretic.   HENT:      Mouth/Throat:      Comments: Examination reveals diffuse caries with poor dentition.  He has some very mild swelling upon palpation of the right maxillary area at the gingival line at approximately tooth 5 and 6  Cardiovascular:      Rate and Rhythm: Regular rhythm. Tachycardia present.   Pulmonary:      Effort: Pulmonary effort is normal.   Skin:     General: Skin is warm and dry.   Neurological:      General: No focal deficit present.      Mental Status: He is alert and oriented to person, place, and time.         ED Course        Procedures              Critical Care time:  none              No results found for this or any previous visit (from the past 24 hour(s)).    Medications - No data to display    Assessments & Plan (with Medical Decision Making)   49-year-old male with subjective and objective findings most consistent with a small dental abscess involving the right maxilla.  He has no high risk or red flag features.  Tachycardia is chronic.  No fevers.  No drooling.  Normal speech.  We will start Augmentin and pain medications.  Close clinic follow-up was stressed.  Close dental follow-up was stressed.  Strict return precautions provided.    This document was prepared using a combination of typing and voice generated software.  While every attempt was made for accuracy, spelling and grammatical errors may exist.     I have reviewed the nursing notes.    I have reviewed the findings, diagnosis, plan and need for follow up with the patient.           Medical Decision Making  The patient's presentation was  of low complexity (an acute and uncomplicated illness or injury).    The patient's evaluation involved:  history and exam without other MDM data elements    The patient's management necessitated moderate risk (prescription drug management including medications given in the ED).        New Prescriptions    AMOXICILLIN-CLAVULANATE (AUGMENTIN) 875-125 MG TABLET    Take 1 tablet by mouth 2 times daily.    KETOROLAC (TORADOL) 10 MG TABLET    Take 1 tablet (10 mg) by mouth every 6 hours as needed for moderate pain.       Final diagnoses:   Dental abscess       10/10/2024   [facility]       Toshia Burciaga PA-C  10/10/24 1906       Toshia Burciaga PA-C  10/10/24 1907

## 2024-10-11 NOTE — DISCHARGE INSTRUCTIONS
Start Augmentin tonight as prescribed twice a day for the next 7 days.    Ketorolac as needed for pain.  You may also use Tylenol.    Return here for any worsening or new symptoms.    Follow-up with a dentist next week.

## 2025-02-05 ENCOUNTER — HOSPITAL ENCOUNTER (EMERGENCY)
Facility: HOSPITAL | Age: 50
Discharge: HOME | End: 2025-02-05
Payer: COMMERCIAL

## 2025-02-05 VITALS
DIASTOLIC BLOOD PRESSURE: 88 MMHG | SYSTOLIC BLOOD PRESSURE: 137 MMHG | OXYGEN SATURATION: 99 % | HEART RATE: 98 BPM | TEMPERATURE: 100.1 F | RESPIRATION RATE: 18 BRPM

## 2025-02-05 DIAGNOSIS — J10.1 INFLUENZA A: ICD-10-CM

## 2025-02-05 LAB
FLUAV RNA SPEC QL NAA+PROBE: POSITIVE
FLUBV RNA RESP QL NAA+PROBE: NEGATIVE
RSV RNA SPEC NAA+PROBE: NEGATIVE
S PYO DNA THROAT QL NAA+PROBE: NOT DETECTED
SARS-COV-2 RNA RESP QL NAA+PROBE: NEGATIVE

## 2025-02-05 PROCEDURE — G0463 HOSPITAL OUTPT CLINIC VISIT: HCPCS

## 2025-02-05 PROCEDURE — 99213 OFFICE O/P EST LOW 20 MIN: CPT | Performed by: PHYSICIAN ASSISTANT

## 2025-02-05 PROCEDURE — 87637 SARSCOV2&INF A&B&RSV AMP PRB: CPT | Performed by: PHYSICIAN ASSISTANT

## 2025-02-05 PROCEDURE — 87651 STREP A DNA AMP PROBE: CPT | Performed by: PHYSICIAN ASSISTANT

## 2025-02-05 RX ORDER — OSELTAMIVIR PHOSPHATE 75 MG/1
75 CAPSULE ORAL 2 TIMES DAILY
Qty: 10 CAPSULE | Refills: 0 | Status: SHIPPED | OUTPATIENT
Start: 2025-02-05

## 2025-02-05 ASSESSMENT — COLUMBIA-SUICIDE SEVERITY RATING SCALE - C-SSRS
2. HAVE YOU ACTUALLY HAD ANY THOUGHTS OF KILLING YOURSELF IN THE PAST MONTH?: NO
6. HAVE YOU EVER DONE ANYTHING, STARTED TO DO ANYTHING, OR PREPARED TO DO ANYTHING TO END YOUR LIFE?: NO
1. IN THE PAST MONTH, HAVE YOU WISHED YOU WERE DEAD OR WISHED YOU COULD GO TO SLEEP AND NOT WAKE UP?: NO

## 2025-02-05 ASSESSMENT — ACTIVITIES OF DAILY LIVING (ADL)
ADLS_ACUITY_SCORE: 41
ADLS_ACUITY_SCORE: 41

## 2025-02-06 NOTE — DISCHARGE INSTRUCTIONS
We will contact you if the results are positive.  At this time I recommend that you drink hot tea and honey take vitamin C.  Take elderberry zinc as well.  You can alternate Tylenol and ibuprofen every 3 hours for fevers headaches or pains.  Return if your symptoms are worsening.    Take the tamiflu as prescribed.

## 2025-02-14 NOTE — ED PROVIDER NOTES
History     Chief Complaint   Patient presents with    Pharyngitis     HPI  Jose Alberto Lu is a 49 year old male who presents for evaluation of cough congestion fevers chills body aches joint pains and shortness of breath sensation.  Patient had symptoms ongoing for 3 days.    Allergies:  No Known Allergies    Problem List:    Patient Active Problem List    Diagnosis Date Noted    Suicidal ideation 08/26/2018     Priority: Medium    Polysubstance abuse (H) 09/28/2011     Priority: Medium    MARY (generalized anxiety disorder) 07/16/2009     Priority: Medium     Overview:   IMO Update 10/11      GERD (gastroesophageal reflux disease) 07/16/2009     Priority: Medium    Hypothyroidism 07/16/2009     Priority: Medium    Schizoaffective disorder (H) 07/16/2009     Priority: Medium    Dysthymic disorder 05/13/2008     Priority: Medium        Past Medical History:    Past Medical History:   Diagnosis Date    Anxiety     Depression     Schizoaffective disorder (H)     TBI (traumatic brain injury) (H) 1991    TMJ (dislocation of temporomandibular joint)        Past Surgical History:    Past Surgical History:   Procedure Laterality Date    BACK SURGERY  2009 or 2010    BRAIN SURGERY  1991    Reports part of skull removed     HAND SURGERY Right 1996    HERNIA REPAIR  2009 or 2010       Family History:    Family History   Family history unknown: Yes       Social History:  Marital Status:  Single [1]  Social History     Tobacco Use    Smoking status: Every Day     Current packs/day: 0.50     Average packs/day: 0.5 packs/day for 32.1 years (16.1 ttl pk-yrs)     Types: Cigarettes     Start date: 1/1/1993    Smokeless tobacco: Never   Substance Use Topics    Alcohol use: No    Drug use: Yes     Frequency: 7.0 times per week     Types: Marijuana        Medications:    oseltamivir (TAMIFLU) 75 MG capsule  amoxicillin-clavulanate (AUGMENTIN) 875-125 MG tablet  ketorolac (TORADOL) 10 MG tablet          Review of Systems   All other  systems reviewed and are negative.      Physical Exam   BP: 137/88  Pulse: 98  Temp: 100.1  F (37.8  C)  Resp: 18  SpO2: 99 %      Physical Exam  Vitals and nursing note reviewed.   Constitutional:       General: He is not in acute distress.     Appearance: Normal appearance. He is normal weight. He is not ill-appearing, toxic-appearing or diaphoretic.   HENT:      Head: Normocephalic and atraumatic.      Right Ear: Hearing, tympanic membrane, ear canal and external ear normal.      Left Ear: Hearing, tympanic membrane, ear canal and external ear normal.      Nose: Congestion and rhinorrhea present.      Mouth/Throat:      Mouth: Mucous membranes are moist.      Pharynx: No oropharyngeal exudate or posterior oropharyngeal erythema.      Tonsils: No tonsillar exudate.   Eyes:      Extraocular Movements: Extraocular movements intact.      Conjunctiva/sclera: Conjunctivae normal.      Pupils: Pupils are equal, round, and reactive to light.   Cardiovascular:      Rate and Rhythm: Normal rate and regular rhythm.   Pulmonary:      Effort: Pulmonary effort is normal. No respiratory distress.      Breath sounds: Normal breath sounds and air entry. No decreased breath sounds, wheezing, rhonchi or rales.   Musculoskeletal:         General: Normal range of motion.   Skin:     General: Skin is warm and dry.      Coloration: Skin is not jaundiced or pale.   Neurological:      Mental Status: He is alert and oriented to person, place, and time. Mental status is at baseline.      Cranial Nerves: No cranial nerve deficit.   Psychiatric:         Mood and Affect: Mood normal.         ED Course      Symptoms consistent with influenza. Testing shows influenza a. Will treat with tamiflu. Discussed supportive care and follow up.    Procedures                  No results found for this or any previous visit (from the past 24 hours).    Medications - No data to display    Assessments & Plan (with Medical Decision Making)     I have reviewed  the nursing notes.    I have reviewed the findings, diagnosis, plan and need for follow up with the patient.          Discharge Medication List as of 2/5/2025  7:12 PM        START taking these medications    Details   oseltamivir (TAMIFLU) 75 MG capsule Take 1 capsule (75 mg) by mouth 2 times daily., Disp-10 capsule, R-0, [facility]             Final diagnoses:   Influenza A       2/5/2025   [facility]       Angel Tyler PA-C  02/14/25 0921       Angel Tyler PA-C  02/14/25 0922